# Patient Record
Sex: MALE | Race: WHITE | NOT HISPANIC OR LATINO | Employment: OTHER | URBAN - METROPOLITAN AREA
[De-identification: names, ages, dates, MRNs, and addresses within clinical notes are randomized per-mention and may not be internally consistent; named-entity substitution may affect disease eponyms.]

---

## 2022-05-04 ENCOUNTER — ANESTHESIA EVENT (OUTPATIENT)
Dept: SURGERY | Facility: CLINIC | Age: 27
End: 2022-05-04
Payer: COMMERCIAL

## 2022-05-04 ENCOUNTER — ANESTHESIA (OUTPATIENT)
Dept: SURGERY | Facility: CLINIC | Age: 27
End: 2022-05-04
Payer: COMMERCIAL

## 2022-05-04 ENCOUNTER — APPOINTMENT (OUTPATIENT)
Dept: GENERAL RADIOLOGY | Facility: CLINIC | Age: 27
End: 2022-05-04
Attending: STUDENT IN AN ORGANIZED HEALTH CARE EDUCATION/TRAINING PROGRAM
Payer: COMMERCIAL

## 2022-05-04 ENCOUNTER — HOSPITAL ENCOUNTER (INPATIENT)
Facility: CLINIC | Age: 27
LOS: 22 days | Discharge: HOME | End: 2022-05-26
Attending: THORACIC SURGERY (CARDIOTHORACIC VASCULAR SURGERY) | Admitting: OTOLARYNGOLOGY
Payer: COMMERCIAL

## 2022-05-04 DIAGNOSIS — Z93.1 GASTROSTOMY IN PLACE (H): ICD-10-CM

## 2022-05-04 DIAGNOSIS — J98.51 MEDIASTINITIS: Primary | ICD-10-CM

## 2022-05-04 DIAGNOSIS — D72.819 LEUKOPENIA, UNSPECIFIED TYPE: ICD-10-CM

## 2022-05-04 DIAGNOSIS — M79.89 NECROTIZING SOFT TISSUE INFECTION: ICD-10-CM

## 2022-05-04 DIAGNOSIS — F41.9 ANXIETY: ICD-10-CM

## 2022-05-04 DIAGNOSIS — R74.8 ELEVATED LIVER ENZYMES: ICD-10-CM

## 2022-05-04 LAB
ABO/RH(D): NORMAL
ALBUMIN SERPL-MCNC: 2 G/DL (ref 3.4–5)
ALP SERPL-CCNC: 93 U/L (ref 40–150)
ALT SERPL W P-5'-P-CCNC: 26 U/L (ref 0–70)
ANION GAP SERPL CALCULATED.3IONS-SCNC: 4 MMOL/L (ref 3–14)
ANTIBODY SCREEN: NEGATIVE
APTT PPP: 39 SECONDS (ref 22–38)
APTT PPP: 44 SECONDS (ref 22–38)
AST SERPL W P-5'-P-CCNC: 32 U/L (ref 0–45)
BASE EXCESS BLDA CALC-SCNC: -1.5 MMOL/L (ref -9–1.8)
BASE EXCESS BLDA CALC-SCNC: -2.1 MMOL/L (ref -9.6–2)
BASE EXCESS BLDA CALC-SCNC: -2.9 MMOL/L (ref -9–1.8)
BILIRUB SERPL-MCNC: 1.5 MG/DL (ref 0.2–1.3)
BLD PROD TYP BPU: NORMAL
BLD PROD TYP BPU: NORMAL
BLOOD COMPONENT TYPE: NORMAL
BLOOD COMPONENT TYPE: NORMAL
BUN SERPL-MCNC: 31 MG/DL (ref 7–30)
CA-I BLD-MCNC: 4.3 MG/DL (ref 4.4–5.2)
CA-I BLD-MCNC: 4.4 MG/DL (ref 4.4–5.2)
CALCIUM SERPL-MCNC: 7.8 MG/DL (ref 8.5–10.1)
CANNABINOIDS UR QL SCN: ABNORMAL
CHLORIDE BLD-SCNC: 108 MMOL/L (ref 94–109)
CK SERPL-CCNC: 384 U/L (ref 30–300)
CO2 SERPL-SCNC: 25 MMOL/L (ref 20–32)
CODING SYSTEM: NORMAL
CODING SYSTEM: NORMAL
CREAT SERPL-MCNC: 1.12 MG/DL (ref 0.66–1.25)
CREAT UR-MCNC: 86 MG/DL
CROSSMATCH: NORMAL
CROSSMATCH: NORMAL
CRP SERPL-MCNC: 330 MG/L (ref 0–8)
CRP SERPL-MCNC: 350 MG/L (ref 0–8)
ERYTHROCYTE [DISTWIDTH] IN BLOOD BY AUTOMATED COUNT: 14.2 % (ref 10–15)
ERYTHROCYTE [SEDIMENTATION RATE] IN BLOOD BY WESTERGREN METHOD: 69 MM/HR (ref 0–15)
ERYTHROCYTE [SEDIMENTATION RATE] IN BLOOD BY WESTERGREN METHOD: 80 MM/HR (ref 0–15)
FIBRINOGEN PPP-MCNC: 692 MG/DL (ref 170–490)
FIBRINOGEN PPP-MCNC: 749 MG/DL (ref 170–490)
GFR SERPL CREATININE-BSD FRML MDRD: >90 ML/MIN/1.73M2
GLUCOSE BLD-MCNC: 122 MG/DL (ref 70–99)
GLUCOSE BLD-MCNC: 131 MG/DL (ref 70–99)
GLUCOSE BLDC GLUCOMTR-MCNC: 120 MG/DL (ref 70–99)
GLUCOSE BLDC GLUCOMTR-MCNC: 125 MG/DL (ref 70–99)
GLUCOSE BLDC GLUCOMTR-MCNC: 127 MG/DL (ref 70–99)
GLUCOSE BLDC GLUCOMTR-MCNC: 130 MG/DL (ref 70–99)
GLUCOSE BLDC GLUCOMTR-MCNC: 155 MG/DL (ref 70–99)
GLUCOSE BLDC GLUCOMTR-MCNC: 160 MG/DL (ref 70–99)
GRAM STAIN RESULT: ABNORMAL
HAV IGG SER QL IA: NONREACTIVE
HAV IGM SERPL QL IA: NONREACTIVE
HBA1C MFR BLD: 4.8 % (ref 0–5.6)
HBV CORE AB SERPL QL IA: NONREACTIVE
HBV SURFACE AB SERPL IA-ACNC: 759.78 M[IU]/ML
HBV SURFACE AG SERPL QL IA: NONREACTIVE
HCO3 BLD-SCNC: 24 MMOL/L (ref 21–28)
HCO3 BLD-SCNC: 24 MMOL/L (ref 21–28)
HCO3 BLDA-SCNC: 23 MMOL/L (ref 21–28)
HCT VFR BLD AUTO: 30.6 % (ref 40–53)
HCV AB SERPL QL IA: NONREACTIVE
HGB BLD-MCNC: 10 G/DL (ref 13.3–17.7)
HGB BLD-MCNC: 9.7 G/DL (ref 13.3–17.7)
INR PPP: 1.36 (ref 0.85–1.15)
INR PPP: 1.41 (ref 0.85–1.15)
ISSUE DATE AND TIME: NORMAL
ISSUE DATE AND TIME: NORMAL
LACTATE BLD-SCNC: 1 MMOL/L
LACTATE SERPL-SCNC: 1.7 MMOL/L (ref 0.7–2)
MAGNESIUM SERPL-MCNC: 1.9 MG/DL (ref 1.6–2.3)
MCH RBC QN AUTO: 30 PG (ref 26.5–33)
MCHC RBC AUTO-ENTMCNC: 32.7 G/DL (ref 31.5–36.5)
MCV RBC AUTO: 92 FL (ref 78–100)
MRSA DNA SPEC QL NAA+PROBE: NEGATIVE
O2/TOTAL GAS SETTING VFR VENT: 100 %
O2/TOTAL GAS SETTING VFR VENT: 50 %
O2/TOTAL GAS SETTING VFR VENT: 60 %
OXYHGB MFR BLD: 97 % (ref 92–100)
OXYHGB MFR BLDA: 99 % (ref 92–100)
PCO2 BLD: 41 MM HG (ref 35–45)
PCO2 BLD: 51 MM HG (ref 35–45)
PCO2 BLDA: 38 MM HG (ref 35–45)
PH BLD: 7.28 [PH] (ref 7.35–7.45)
PH BLD: 7.37 [PH] (ref 7.35–7.45)
PH BLDA: 7.39 [PH] (ref 7.35–7.45)
PHOSPHATE SERPL-MCNC: 3.7 MG/DL (ref 2.5–4.5)
PLATELET # BLD AUTO: 199 10E3/UL (ref 150–450)
PLATELET # BLD AUTO: 241 10E3/UL (ref 150–450)
PO2 BLD: 136 MM HG (ref 80–105)
PO2 BLD: 175 MM HG (ref 80–105)
PO2 BLDA: 444 MM HG (ref 80–105)
POTASSIUM BLD-SCNC: 3.8 MMOL/L (ref 3.5–5)
POTASSIUM BLD-SCNC: 4 MMOL/L (ref 3.4–5.3)
POTASSIUM BLD-SCNC: 4 MMOL/L (ref 3.4–5.3)
PREALB SERPL IA-MCNC: 6 MG/DL (ref 15–45)
PREALB SERPL IA-MCNC: 6 MG/DL (ref 15–45)
PROT SERPL-MCNC: 5.7 G/DL (ref 6.8–8.8)
RBC # BLD AUTO: 3.33 10E6/UL (ref 4.4–5.9)
SA TARGET DNA: POSITIVE
SODIUM BLD-SCNC: 137 MMOL/L (ref 133–144)
SODIUM SERPL-SCNC: 137 MMOL/L (ref 133–144)
SPECIMEN EXPIRATION DATE: NORMAL
UNIT ABO/RH: NORMAL
UNIT ABO/RH: NORMAL
UNIT NUMBER: NORMAL
UNIT NUMBER: NORMAL
UNIT STATUS: NORMAL
UNIT STATUS: NORMAL
UNIT TYPE ISBT: 9500
UNIT TYPE ISBT: 9500
WBC # BLD AUTO: 12.4 10E3/UL (ref 4–11)

## 2022-05-04 PROCEDURE — 272N000473 HC KIT, VPS RHYTHM STYLET

## 2022-05-04 PROCEDURE — 82805 BLOOD GASES W/O2 SATURATION: CPT | Performed by: STUDENT IN AN ORGANIZED HEALTH CARE EDUCATION/TRAINING PROGRAM

## 2022-05-04 PROCEDURE — 258N000003 HC RX IP 258 OP 636: Performed by: ANESTHESIOLOGY

## 2022-05-04 PROCEDURE — 85049 AUTOMATED PLATELET COUNT: CPT | Performed by: ANESTHESIOLOGY

## 2022-05-04 PROCEDURE — 86803 HEPATITIS C AB TEST: CPT | Performed by: DIETITIAN, REGISTERED

## 2022-05-04 PROCEDURE — 82550 ASSAY OF CK (CPK): CPT | Performed by: STUDENT IN AN ORGANIZED HEALTH CARE EDUCATION/TRAINING PROGRAM

## 2022-05-04 PROCEDURE — 87205 SMEAR GRAM STAIN: CPT | Performed by: THORACIC SURGERY (CARDIOTHORACIC VASCULAR SURGERY)

## 2022-05-04 PROCEDURE — 250N000011 HC RX IP 250 OP 636

## 2022-05-04 PROCEDURE — 0KB30ZZ EXCISION OF LEFT NECK MUSCLE, OPEN APPROACH: ICD-10-PCS | Performed by: OTOLARYNGOLOGY

## 2022-05-04 PROCEDURE — 5A1955Z RESPIRATORY VENTILATION, GREATER THAN 96 CONSECUTIVE HOURS: ICD-10-PCS | Performed by: OTOLARYNGOLOGY

## 2022-05-04 PROCEDURE — 80307 DRUG TEST PRSMV CHEM ANLYZR: CPT | Performed by: STUDENT IN AN ORGANIZED HEALTH CARE EDUCATION/TRAINING PROGRAM

## 2022-05-04 PROCEDURE — 0W930ZZ DRAINAGE OF ORAL CAVITY AND THROAT, OPEN APPROACH: ICD-10-PCS | Performed by: OTOLARYNGOLOGY

## 2022-05-04 PROCEDURE — C9113 INJ PANTOPRAZOLE SODIUM, VIA: HCPCS | Performed by: STUDENT IN AN ORGANIZED HEALTH CARE EDUCATION/TRAINING PROGRAM

## 2022-05-04 PROCEDURE — 999N000157 HC STATISTIC RCP TIME EA 10 MIN

## 2022-05-04 PROCEDURE — 71045 X-RAY EXAM CHEST 1 VIEW: CPT

## 2022-05-04 PROCEDURE — P9041 ALBUMIN (HUMAN),5%, 50ML: HCPCS

## 2022-05-04 PROCEDURE — 80053 COMPREHEN METABOLIC PANEL: CPT | Performed by: STUDENT IN AN ORGANIZED HEALTH CARE EDUCATION/TRAINING PROGRAM

## 2022-05-04 PROCEDURE — 3E043XZ INTRODUCTION OF VASOPRESSOR INTO CENTRAL VEIN, PERCUTANEOUS APPROACH: ICD-10-PCS | Performed by: OTOLARYNGOLOGY

## 2022-05-04 PROCEDURE — 85652 RBC SED RATE AUTOMATED: CPT | Performed by: STUDENT IN AN ORGANIZED HEALTH CARE EDUCATION/TRAINING PROGRAM

## 2022-05-04 PROCEDURE — 0CDXXZ1 EXTRACTION OF LOWER TOOTH, MULTIPLE, EXTERNAL APPROACH: ICD-10-PCS | Performed by: OTOLARYNGOLOGY

## 2022-05-04 PROCEDURE — 250N000025 HC SEVOFLURANE, PER MIN: Performed by: THORACIC SURGERY (CARDIOTHORACIC VASCULAR SURGERY)

## 2022-05-04 PROCEDURE — 250N000009 HC RX 250: Performed by: OTOLARYNGOLOGY

## 2022-05-04 PROCEDURE — 250N000009 HC RX 250: Performed by: NURSE ANESTHETIST, CERTIFIED REGISTERED

## 2022-05-04 PROCEDURE — 250N000011 HC RX IP 250 OP 636: Performed by: NURSE ANESTHETIST, CERTIFIED REGISTERED

## 2022-05-04 PROCEDURE — 85610 PROTHROMBIN TIME: CPT | Performed by: ANESTHESIOLOGY

## 2022-05-04 PROCEDURE — 250N000011 HC RX IP 250 OP 636: Performed by: ANESTHESIOLOGY

## 2022-05-04 PROCEDURE — 83735 ASSAY OF MAGNESIUM: CPT | Performed by: STUDENT IN AN ORGANIZED HEALTH CARE EDUCATION/TRAINING PROGRAM

## 2022-05-04 PROCEDURE — 82803 BLOOD GASES ANY COMBINATION: CPT

## 2022-05-04 PROCEDURE — 85730 THROMBOPLASTIN TIME PARTIAL: CPT | Performed by: STUDENT IN AN ORGANIZED HEALTH CARE EDUCATION/TRAINING PROGRAM

## 2022-05-04 PROCEDURE — 360N000077 HC SURGERY LEVEL 4, PER MIN: Performed by: THORACIC SURGERY (CARDIOTHORACIC VASCULAR SURGERY)

## 2022-05-04 PROCEDURE — P9016 RBC LEUKOCYTES REDUCED: HCPCS

## 2022-05-04 PROCEDURE — 36415 COLL VENOUS BLD VENIPUNCTURE: CPT | Performed by: THORACIC SURGERY (CARDIOTHORACIC VASCULAR SURGERY)

## 2022-05-04 PROCEDURE — 86140 C-REACTIVE PROTEIN: CPT

## 2022-05-04 PROCEDURE — 39010 EXPLORATION OF CHEST: CPT | Mod: GC | Performed by: THORACIC SURGERY (CARDIOTHORACIC VASCULAR SURGERY)

## 2022-05-04 PROCEDURE — 250N000009 HC RX 250: Performed by: THORACIC SURGERY (CARDIOTHORACIC VASCULAR SURGERY)

## 2022-05-04 PROCEDURE — 87070 CULTURE OTHR SPECIMN AEROBIC: CPT | Performed by: THORACIC SURGERY (CARDIOTHORACIC VASCULAR SURGERY)

## 2022-05-04 PROCEDURE — 82803 BLOOD GASES ANY COMBINATION: CPT | Performed by: DIETITIAN, REGISTERED

## 2022-05-04 PROCEDURE — 200N000002 HC R&B ICU UMMC

## 2022-05-04 PROCEDURE — 86901 BLOOD TYPING SEROLOGIC RH(D): CPT | Performed by: ANESTHESIOLOGY

## 2022-05-04 PROCEDURE — 87641 MR-STAPH DNA AMP PROBE: CPT | Performed by: STUDENT IN AN ORGANIZED HEALTH CARE EDUCATION/TRAINING PROGRAM

## 2022-05-04 PROCEDURE — 85027 COMPLETE CBC AUTOMATED: CPT | Performed by: STUDENT IN AN ORGANIZED HEALTH CARE EDUCATION/TRAINING PROGRAM

## 2022-05-04 PROCEDURE — 86709 HEPATITIS A IGM ANTIBODY: CPT | Performed by: DIETITIAN, REGISTERED

## 2022-05-04 PROCEDURE — 0WJB4ZZ INSPECTION OF LEFT PLEURAL CAVITY, PERCUTANEOUS ENDOSCOPIC APPROACH: ICD-10-PCS | Performed by: THORACIC SURGERY (CARDIOTHORACIC VASCULAR SURGERY)

## 2022-05-04 PROCEDURE — 250N000011 HC RX IP 250 OP 636: Performed by: THORACIC SURGERY (CARDIOTHORACIC VASCULAR SURGERY)

## 2022-05-04 PROCEDURE — 99291 CRITICAL CARE FIRST HOUR: CPT | Mod: GC | Performed by: ANESTHESIOLOGY

## 2022-05-04 PROCEDURE — 0W9C0ZZ DRAINAGE OF MEDIASTINUM, OPEN APPROACH: ICD-10-PCS | Performed by: THORACIC SURGERY (CARDIOTHORACIC VASCULAR SURGERY)

## 2022-05-04 PROCEDURE — 99254 IP/OBS CNSLTJ NEW/EST MOD 60: CPT | Performed by: INTERNAL MEDICINE

## 2022-05-04 PROCEDURE — 84134 ASSAY OF PREALBUMIN: CPT

## 2022-05-04 PROCEDURE — 258N000003 HC RX IP 258 OP 636: Performed by: NURSE ANESTHETIST, CERTIFIED REGISTERED

## 2022-05-04 PROCEDURE — 86850 RBC ANTIBODY SCREEN: CPT | Performed by: ANESTHESIOLOGY

## 2022-05-04 PROCEDURE — 85652 RBC SED RATE AUTOMATED: CPT

## 2022-05-04 PROCEDURE — 85384 FIBRINOGEN ACTIVITY: CPT | Performed by: ANESTHESIOLOGY

## 2022-05-04 PROCEDURE — 94002 VENT MGMT INPAT INIT DAY: CPT

## 2022-05-04 PROCEDURE — 272N000451 HC KIT SHRLOCK 5FR POWER PICC DOUBLE LUMEN

## 2022-05-04 PROCEDURE — 85384 FIBRINOGEN ACTIVITY: CPT | Performed by: STUDENT IN AN ORGANIZED HEALTH CARE EDUCATION/TRAINING PROGRAM

## 2022-05-04 PROCEDURE — 86704 HEP B CORE ANTIBODY TOTAL: CPT | Performed by: DIETITIAN, REGISTERED

## 2022-05-04 PROCEDURE — 36569 INSJ PICC 5 YR+ W/O IMAGING: CPT

## 2022-05-04 PROCEDURE — 370N000017 HC ANESTHESIA TECHNICAL FEE, PER MIN: Performed by: THORACIC SURGERY (CARDIOTHORACIC VASCULAR SURGERY)

## 2022-05-04 PROCEDURE — 272N000001 HC OR GENERAL SUPPLY STERILE: Performed by: THORACIC SURGERY (CARDIOTHORACIC VASCULAR SURGERY)

## 2022-05-04 PROCEDURE — 250N000013 HC RX MED GY IP 250 OP 250 PS 637

## 2022-05-04 PROCEDURE — 82330 ASSAY OF CALCIUM: CPT | Performed by: STUDENT IN AN ORGANIZED HEALTH CARE EDUCATION/TRAINING PROGRAM

## 2022-05-04 PROCEDURE — 87340 HEPATITIS B SURFACE AG IA: CPT | Performed by: DIETITIAN, REGISTERED

## 2022-05-04 PROCEDURE — 250N000011 HC RX IP 250 OP 636: Performed by: STUDENT IN AN ORGANIZED HEALTH CARE EDUCATION/TRAINING PROGRAM

## 2022-05-04 PROCEDURE — 83036 HEMOGLOBIN GLYCOSYLATED A1C: CPT | Performed by: STUDENT IN AN ORGANIZED HEALTH CARE EDUCATION/TRAINING PROGRAM

## 2022-05-04 PROCEDURE — 0WJ94ZZ INSPECTION OF RIGHT PLEURAL CAVITY, PERCUTANEOUS ENDOSCOPIC APPROACH: ICD-10-PCS | Performed by: THORACIC SURGERY (CARDIOTHORACIC VASCULAR SURGERY)

## 2022-05-04 PROCEDURE — 99291 CRITICAL CARE FIRST HOUR: CPT | Performed by: INTERNAL MEDICINE

## 2022-05-04 PROCEDURE — 87077 CULTURE AEROBIC IDENTIFY: CPT | Performed by: THORACIC SURGERY (CARDIOTHORACIC VASCULAR SURGERY)

## 2022-05-04 PROCEDURE — 250N000009 HC RX 250: Performed by: STUDENT IN AN ORGANIZED HEALTH CARE EDUCATION/TRAINING PROGRAM

## 2022-05-04 PROCEDURE — 87521 HEPATITIS C PROBE&RVRS TRNSC: CPT | Performed by: DIETITIAN, REGISTERED

## 2022-05-04 PROCEDURE — 84100 ASSAY OF PHOSPHORUS: CPT | Performed by: STUDENT IN AN ORGANIZED HEALTH CARE EDUCATION/TRAINING PROGRAM

## 2022-05-04 PROCEDURE — 0W960ZZ DRAINAGE OF NECK, OPEN APPROACH: ICD-10-PCS | Performed by: OTOLARYNGOLOGY

## 2022-05-04 PROCEDURE — 88300 SURGICAL PATH GROSS: CPT | Mod: TC | Performed by: THORACIC SURGERY (CARDIOTHORACIC VASCULAR SURGERY)

## 2022-05-04 PROCEDURE — 86706 HEP B SURFACE ANTIBODY: CPT | Performed by: DIETITIAN, REGISTERED

## 2022-05-04 PROCEDURE — 82810 BLOOD GASES O2 SAT ONLY: CPT

## 2022-05-04 PROCEDURE — 85730 THROMBOPLASTIN TIME PARTIAL: CPT | Performed by: ANESTHESIOLOGY

## 2022-05-04 PROCEDURE — 0C9M0ZZ DRAINAGE OF PHARYNX, OPEN APPROACH: ICD-10-PCS | Performed by: OTOLARYNGOLOGY

## 2022-05-04 PROCEDURE — 87040 BLOOD CULTURE FOR BACTERIA: CPT | Performed by: THORACIC SURGERY (CARDIOTHORACIC VASCULAR SURGERY)

## 2022-05-04 PROCEDURE — 258N000003 HC RX IP 258 OP 636: Performed by: STUDENT IN AN ORGANIZED HEALTH CARE EDUCATION/TRAINING PROGRAM

## 2022-05-04 PROCEDURE — 0DH63UZ INSERTION OF FEEDING DEVICE INTO STOMACH, PERCUTANEOUS APPROACH: ICD-10-PCS | Performed by: THORACIC SURGERY (CARDIOTHORACIC VASCULAR SURGERY)

## 2022-05-04 PROCEDURE — 83605 ASSAY OF LACTIC ACID: CPT | Performed by: STUDENT IN AN ORGANIZED HEALTH CARE EDUCATION/TRAINING PROGRAM

## 2022-05-04 PROCEDURE — 85610 PROTHROMBIN TIME: CPT | Performed by: STUDENT IN AN ORGANIZED HEALTH CARE EDUCATION/TRAINING PROGRAM

## 2022-05-04 PROCEDURE — 87075 CULTR BACTERIA EXCEPT BLOOD: CPT | Performed by: THORACIC SURGERY (CARDIOTHORACIC VASCULAR SURGERY)

## 2022-05-04 PROCEDURE — 250N000011 HC RX IP 250 OP 636: Performed by: DIETITIAN, REGISTERED

## 2022-05-04 PROCEDURE — 87102 FUNGUS ISOLATION CULTURE: CPT | Performed by: THORACIC SURGERY (CARDIOTHORACIC VASCULAR SURGERY)

## 2022-05-04 PROCEDURE — 71045 X-RAY EXAM CHEST 1 VIEW: CPT | Mod: 26 | Performed by: RADIOLOGY

## 2022-05-04 PROCEDURE — 84134 ASSAY OF PREALBUMIN: CPT | Performed by: STUDENT IN AN ORGANIZED HEALTH CARE EDUCATION/TRAINING PROGRAM

## 2022-05-04 PROCEDURE — 272N000201 ZZ HC ADHESIVE SKIN CLOSURE, DERMABOND

## 2022-05-04 PROCEDURE — 86140 C-REACTIVE PROTEIN: CPT | Performed by: STUDENT IN AN ORGANIZED HEALTH CARE EDUCATION/TRAINING PROGRAM

## 2022-05-04 PROCEDURE — 258N000003 HC RX IP 258 OP 636

## 2022-05-04 PROCEDURE — 86923 COMPATIBILITY TEST ELECTRIC: CPT

## 2022-05-04 PROCEDURE — 250N000012 HC RX MED GY IP 250 OP 636 PS 637: Performed by: STUDENT IN AN ORGANIZED HEALTH CARE EDUCATION/TRAINING PROGRAM

## 2022-05-04 PROCEDURE — 86708 HEPATITIS A ANTIBODY: CPT | Performed by: DIETITIAN, REGISTERED

## 2022-05-04 PROCEDURE — 258N000003 HC RX IP 258 OP 636: Performed by: THORACIC SURGERY (CARDIOTHORACIC VASCULAR SURGERY)

## 2022-05-04 PROCEDURE — 999N000104 HC STATISTIC NO CHARGE

## 2022-05-04 RX ORDER — AMOXICILLIN 250 MG
1 CAPSULE ORAL 2 TIMES DAILY
Status: DISCONTINUED | OUTPATIENT
Start: 2022-05-04 | End: 2022-05-06

## 2022-05-04 RX ORDER — POLYETHYLENE GLYCOL 3350 17 G/17G
17 POWDER, FOR SOLUTION ORAL DAILY PRN
Status: DISCONTINUED | OUTPATIENT
Start: 2022-05-04 | End: 2022-05-06

## 2022-05-04 RX ORDER — HEPARIN SODIUM,PORCINE 10 UNIT/ML
5-20 VIAL (ML) INTRAVENOUS
Status: DISCONTINUED | OUTPATIENT
Start: 2022-05-04 | End: 2022-05-26 | Stop reason: HOSPADM

## 2022-05-04 RX ORDER — LIDOCAINE 40 MG/G
CREAM TOPICAL
Status: ACTIVE | OUTPATIENT
Start: 2022-05-04 | End: 2022-05-07

## 2022-05-04 RX ORDER — ALBUMIN, HUMAN INJ 5% 5 %
12.5 SOLUTION INTRAVENOUS ONCE
Status: COMPLETED | OUTPATIENT
Start: 2022-05-04 | End: 2022-05-04

## 2022-05-04 RX ORDER — PROPOFOL 10 MG/ML
INJECTION, EMULSION INTRAVENOUS CONTINUOUS PRN
Status: DISCONTINUED | OUTPATIENT
Start: 2022-05-04 | End: 2022-05-04

## 2022-05-04 RX ORDER — DEXTROSE MONOHYDRATE 25 G/50ML
25-50 INJECTION, SOLUTION INTRAVENOUS
Status: DISCONTINUED | OUTPATIENT
Start: 2022-05-04 | End: 2022-05-04

## 2022-05-04 RX ORDER — PROCHLORPERAZINE MALEATE 10 MG
10 TABLET ORAL EVERY 6 HOURS PRN
Status: DISCONTINUED | OUTPATIENT
Start: 2022-05-04 | End: 2022-05-26 | Stop reason: HOSPADM

## 2022-05-04 RX ORDER — BISACODYL 10 MG
10 SUPPOSITORY, RECTAL RECTAL DAILY PRN
Status: DISCONTINUED | OUTPATIENT
Start: 2022-05-04 | End: 2022-05-26 | Stop reason: HOSPADM

## 2022-05-04 RX ORDER — NICOTINE POLACRILEX 4 MG
15-30 LOZENGE BUCCAL
Status: DISCONTINUED | OUTPATIENT
Start: 2022-05-04 | End: 2022-05-04

## 2022-05-04 RX ORDER — PIPERACILLIN SODIUM, TAZOBACTAM SODIUM 2; .25 G/10ML; G/10ML
INJECTION, POWDER, LYOPHILIZED, FOR SOLUTION INTRAVENOUS PRN
Status: DISCONTINUED | OUTPATIENT
Start: 2022-05-04 | End: 2022-05-04

## 2022-05-04 RX ORDER — DEXTROSE MONOHYDRATE 25 G/50ML
25-50 INJECTION, SOLUTION INTRAVENOUS
Status: DISCONTINUED | OUTPATIENT
Start: 2022-05-04 | End: 2022-05-26 | Stop reason: HOSPADM

## 2022-05-04 RX ORDER — PROPOFOL 10 MG/ML
5-75 INJECTION, EMULSION INTRAVENOUS CONTINUOUS
Status: DISCONTINUED | OUTPATIENT
Start: 2022-05-04 | End: 2022-05-04

## 2022-05-04 RX ORDER — CALCIUM CHLORIDE 100 MG/ML
INJECTION INTRAVENOUS; INTRAVENTRICULAR PRN
Status: DISCONTINUED | OUTPATIENT
Start: 2022-05-04 | End: 2022-05-04

## 2022-05-04 RX ORDER — PROPOFOL 10 MG/ML
5-75 INJECTION, EMULSION INTRAVENOUS CONTINUOUS
Status: DISCONTINUED | OUTPATIENT
Start: 2022-05-04 | End: 2022-05-08

## 2022-05-04 RX ORDER — ACETAMINOPHEN 325 MG/1
650 TABLET ORAL EVERY 4 HOURS PRN
Status: DISCONTINUED | OUTPATIENT
Start: 2022-05-04 | End: 2022-05-26 | Stop reason: HOSPADM

## 2022-05-04 RX ORDER — CLINDAMYCIN PHOSPHATE 900 MG/50ML
900 INJECTION, SOLUTION INTRAVENOUS EVERY 8 HOURS
Status: DISCONTINUED | OUTPATIENT
Start: 2022-05-04 | End: 2022-05-04

## 2022-05-04 RX ORDER — ONDANSETRON 2 MG/ML
4 INJECTION INTRAMUSCULAR; INTRAVENOUS EVERY 6 HOURS PRN
Status: DISCONTINUED | OUTPATIENT
Start: 2022-05-04 | End: 2022-05-26 | Stop reason: HOSPADM

## 2022-05-04 RX ORDER — HEPARIN SODIUM,PORCINE 10 UNIT/ML
5-20 VIAL (ML) INTRAVENOUS EVERY 24 HOURS
Status: DISCONTINUED | OUTPATIENT
Start: 2022-05-04 | End: 2022-05-14 | Stop reason: CLARIF

## 2022-05-04 RX ORDER — SODIUM CHLORIDE, SODIUM LACTATE, POTASSIUM CHLORIDE, CALCIUM CHLORIDE 600; 310; 30; 20 MG/100ML; MG/100ML; MG/100ML; MG/100ML
INJECTION, SOLUTION INTRAVENOUS CONTINUOUS
Status: ACTIVE | OUTPATIENT
Start: 2022-05-04 | End: 2022-05-05

## 2022-05-04 RX ORDER — ONDANSETRON 2 MG/ML
INJECTION INTRAMUSCULAR; INTRAVENOUS PRN
Status: DISCONTINUED | OUTPATIENT
Start: 2022-05-04 | End: 2022-05-04

## 2022-05-04 RX ORDER — CLINDAMYCIN PHOSPHATE 900 MG/50ML
900 INJECTION, SOLUTION INTRAVENOUS EVERY 8 HOURS
Status: DISCONTINUED | OUTPATIENT
Start: 2022-05-04 | End: 2022-05-06

## 2022-05-04 RX ORDER — PIPERACILLIN SODIUM, TAZOBACTAM SODIUM 4; .5 G/20ML; G/20ML
4.5 INJECTION, POWDER, LYOPHILIZED, FOR SOLUTION INTRAVENOUS EVERY 6 HOURS
Status: DISCONTINUED | OUTPATIENT
Start: 2022-05-04 | End: 2022-05-06

## 2022-05-04 RX ORDER — NICOTINE POLACRILEX 4 MG
15-30 LOZENGE BUCCAL
Status: DISCONTINUED | OUTPATIENT
Start: 2022-05-04 | End: 2022-05-26 | Stop reason: HOSPADM

## 2022-05-04 RX ORDER — HEPARIN SODIUM 5000 [USP'U]/.5ML
5000 INJECTION, SOLUTION INTRAVENOUS; SUBCUTANEOUS EVERY 8 HOURS
Status: DISCONTINUED | OUTPATIENT
Start: 2022-05-04 | End: 2022-05-08

## 2022-05-04 RX ORDER — NOREPINEPHRINE BITARTRATE 0.06 MG/ML
.01-.6 INJECTION, SOLUTION INTRAVENOUS CONTINUOUS
Status: DISCONTINUED | OUTPATIENT
Start: 2022-05-04 | End: 2022-05-05

## 2022-05-04 RX ORDER — LIDOCAINE HYDROCHLORIDE 10 MG/ML
INJECTION, SOLUTION INFILTRATION; PERINEURAL PRN
Status: DISCONTINUED | OUTPATIENT
Start: 2022-05-04 | End: 2022-05-04 | Stop reason: HOSPADM

## 2022-05-04 RX ORDER — AMINO AC/PROTEIN HYDR/WHEY PRO 10G-100/30
2 LIQUID (ML) ORAL 2 TIMES DAILY
Status: DISCONTINUED | OUTPATIENT
Start: 2022-05-04 | End: 2022-05-25

## 2022-05-04 RX ORDER — ONDANSETRON 4 MG/1
4 TABLET, ORALLY DISINTEGRATING ORAL EVERY 6 HOURS PRN
Status: DISCONTINUED | OUTPATIENT
Start: 2022-05-04 | End: 2022-05-26 | Stop reason: HOSPADM

## 2022-05-04 RX ORDER — NOREPINEPHRINE BITARTRATE 0.06 MG/ML
.01-.6 INJECTION, SOLUTION INTRAVENOUS CONTINUOUS
Status: DISCONTINUED | OUTPATIENT
Start: 2022-05-04 | End: 2022-05-04

## 2022-05-04 RX ORDER — SODIUM CHLORIDE, SODIUM GLUCONATE, SODIUM ACETATE, POTASSIUM CHLORIDE AND MAGNESIUM CHLORIDE 526; 502; 368; 37; 30 MG/100ML; MG/100ML; MG/100ML; MG/100ML; MG/100ML
INJECTION, SOLUTION INTRAVENOUS CONTINUOUS PRN
Status: DISCONTINUED | OUTPATIENT
Start: 2022-05-04 | End: 2022-05-04

## 2022-05-04 RX ORDER — LIDOCAINE HYDROCHLORIDE AND EPINEPHRINE 10; 10 MG/ML; UG/ML
INJECTION, SOLUTION INFILTRATION; PERINEURAL PRN
Status: DISCONTINUED | OUTPATIENT
Start: 2022-05-04 | End: 2022-05-04 | Stop reason: HOSPADM

## 2022-05-04 RX ORDER — PROCHLORPERAZINE 25 MG
25 SUPPOSITORY, RECTAL RECTAL EVERY 12 HOURS PRN
Status: DISCONTINUED | OUTPATIENT
Start: 2022-05-04 | End: 2022-05-26 | Stop reason: HOSPADM

## 2022-05-04 RX ORDER — DEXTROSE MONOHYDRATE 100 MG/ML
INJECTION, SOLUTION INTRAVENOUS CONTINUOUS PRN
Status: DISCONTINUED | OUTPATIENT
Start: 2022-05-04 | End: 2022-05-26 | Stop reason: HOSPADM

## 2022-05-04 RX ADMIN — VANCOMYCIN HYDROCHLORIDE 2500 MG: 1 INJECTION, POWDER, LYOPHILIZED, FOR SOLUTION INTRAVENOUS at 06:41

## 2022-05-04 RX ADMIN — PHENYLEPHRINE HYDROCHLORIDE 150 MCG: 10 INJECTION INTRAVENOUS at 01:59

## 2022-05-04 RX ADMIN — CLINDAMYCIN IN 5 PERCENT DEXTROSE 900 MG: 18 INJECTION, SOLUTION INTRAVENOUS at 07:56

## 2022-05-04 RX ADMIN — PIPERACILLIN AND TAZOBACTAM 2.25 G: 2; .25 INJECTION, POWDER, FOR SOLUTION INTRAVENOUS at 05:00

## 2022-05-04 RX ADMIN — PROPOFOL 45 MCG/KG/MIN: 10 INJECTION, EMULSION INTRAVENOUS at 22:05

## 2022-05-04 RX ADMIN — PIPERACILLIN AND TAZOBACTAM 2.25 G: 2; .25 INJECTION, POWDER, FOR SOLUTION INTRAVENOUS at 03:00

## 2022-05-04 RX ADMIN — SENNOSIDES AND DOCUSATE SODIUM 1 TABLET: 8.6; 5 TABLET ORAL at 20:15

## 2022-05-04 RX ADMIN — SODIUM CHLORIDE, POTASSIUM CHLORIDE, SODIUM LACTATE AND CALCIUM CHLORIDE: 600; 310; 30; 20 INJECTION, SOLUTION INTRAVENOUS at 16:11

## 2022-05-04 RX ADMIN — PIPERACILLIN SODIUM AND TAZOBACTAM SODIUM 4.5 G: 4; .5 INJECTION, POWDER, LYOPHILIZED, FOR SOLUTION INTRAVENOUS at 17:34

## 2022-05-04 RX ADMIN — PHENYLEPHRINE HYDROCHLORIDE 150 MCG: 10 INJECTION INTRAVENOUS at 02:04

## 2022-05-04 RX ADMIN — HEPARIN SODIUM 5000 UNITS: 5000 INJECTION, SOLUTION INTRAVENOUS; SUBCUTANEOUS at 20:15

## 2022-05-04 RX ADMIN — ONDANSETRON 4 MG: 2 INJECTION INTRAMUSCULAR; INTRAVENOUS at 04:54

## 2022-05-04 RX ADMIN — PHENYLEPHRINE HYDROCHLORIDE 0.8 MCG/KG/MIN: 10 INJECTION INTRAVENOUS at 02:06

## 2022-05-04 RX ADMIN — PROPOFOL 75 MCG/KG/MIN: 10 INJECTION, EMULSION INTRAVENOUS at 06:27

## 2022-05-04 RX ADMIN — PROPOFOL 40 MCG/KG/MIN: 10 INJECTION, EMULSION INTRAVENOUS at 13:14

## 2022-05-04 RX ADMIN — THIAMINE HYDROCHLORIDE 200 MG: 100 INJECTION, SOLUTION INTRAMUSCULAR; INTRAVENOUS at 20:16

## 2022-05-04 RX ADMIN — INSULIN ASPART 1 UNITS: 100 INJECTION, SOLUTION INTRAVENOUS; SUBCUTANEOUS at 20:23

## 2022-05-04 RX ADMIN — PANTOPRAZOLE SODIUM 40 MG: 40 INJECTION, POWDER, FOR SOLUTION INTRAVENOUS at 07:52

## 2022-05-04 RX ADMIN — PROPOFOL 100 MCG/KG/MIN: 10 INJECTION, EMULSION INTRAVENOUS at 05:00

## 2022-05-04 RX ADMIN — ROCURONIUM BROMIDE 50 MG: 50 INJECTION, SOLUTION INTRAVENOUS at 02:27

## 2022-05-04 RX ADMIN — SODIUM CHLORIDE, SODIUM GLUCONATE, SODIUM ACETATE, POTASSIUM CHLORIDE AND MAGNESIUM CHLORIDE: 526; 502; 368; 37; 30 INJECTION, SOLUTION INTRAVENOUS at 04:24

## 2022-05-04 RX ADMIN — Medication 0.05 MCG/KG/MIN: at 06:05

## 2022-05-04 RX ADMIN — ALBUMIN (HUMAN) 12.5 G: 12.5 INJECTION, SOLUTION INTRAVENOUS at 17:27

## 2022-05-04 RX ADMIN — PROPOFOL 45 MCG/KG/MIN: 10 INJECTION, EMULSION INTRAVENOUS at 17:43

## 2022-05-04 RX ADMIN — CLINDAMYCIN IN 5 PERCENT DEXTROSE 900 MG: 18 INJECTION, SOLUTION INTRAVENOUS at 15:46

## 2022-05-04 RX ADMIN — HYDROCORTISONE SODIUM SUCCINATE 50 MG: 100 INJECTION, POWDER, FOR SOLUTION INTRAMUSCULAR; INTRAVENOUS at 22:05

## 2022-05-04 RX ADMIN — Medication 2 PACKET: at 20:15

## 2022-05-04 RX ADMIN — HEPARIN SODIUM 5000 UNITS: 5000 INJECTION, SOLUTION INTRAVENOUS; SUBCUTANEOUS at 12:20

## 2022-05-04 RX ADMIN — Medication 100 MCG/HR: at 06:05

## 2022-05-04 RX ADMIN — PROPOFOL 60 MCG/KG/MIN: 10 INJECTION, EMULSION INTRAVENOUS at 09:10

## 2022-05-04 RX ADMIN — PROPOFOL 75 MCG/KG/MIN: 10 INJECTION, EMULSION INTRAVENOUS at 06:04

## 2022-05-04 RX ADMIN — SENNOSIDES AND DOCUSATE SODIUM 1 TABLET: 8.6; 5 TABLET ORAL at 10:27

## 2022-05-04 RX ADMIN — Medication 2 PACKET: at 12:31

## 2022-05-04 RX ADMIN — SODIUM CHLORIDE, POTASSIUM CHLORIDE, SODIUM LACTATE AND CALCIUM CHLORIDE: 600; 310; 30; 20 INJECTION, SOLUTION INTRAVENOUS at 06:01

## 2022-05-04 RX ADMIN — ROCURONIUM BROMIDE 30 MG: 50 INJECTION, SOLUTION INTRAVENOUS at 03:40

## 2022-05-04 RX ADMIN — VASOPRESSIN 2.4 UNITS/HR: 20 INJECTION INTRAVENOUS at 18:05

## 2022-05-04 RX ADMIN — CALCIUM CHLORIDE 750 MG: 100 INJECTION INTRAVENOUS; INTRAVENTRICULAR at 02:14

## 2022-05-04 RX ADMIN — Medication 0.07 MCG/KG/MIN: at 06:26

## 2022-05-04 RX ADMIN — THIAMINE HYDROCHLORIDE 200 MG: 100 INJECTION, SOLUTION INTRAMUSCULAR; INTRAVENOUS at 10:27

## 2022-05-04 RX ADMIN — SODIUM CHLORIDE, SODIUM GLUCONATE, SODIUM ACETATE, POTASSIUM CHLORIDE AND MAGNESIUM CHLORIDE: 526; 502; 368; 37; 30 INJECTION, SOLUTION INTRAVENOUS at 01:42

## 2022-05-04 RX ADMIN — PIPERACILLIN SODIUM AND TAZOBACTAM SODIUM 4.5 G: 4; .5 INJECTION, POWDER, LYOPHILIZED, FOR SOLUTION INTRAVENOUS at 23:35

## 2022-05-04 RX ADMIN — INSULIN ASPART 1 UNITS: 100 INJECTION, SOLUTION INTRAVENOUS; SUBCUTANEOUS at 23:39

## 2022-05-04 RX ADMIN — HYDROCORTISONE SODIUM SUCCINATE 50 MG: 100 INJECTION, POWDER, FOR SOLUTION INTRAMUSCULAR; INTRAVENOUS at 17:24

## 2022-05-04 RX ADMIN — PHENYLEPHRINE HYDROCHLORIDE 1 MCG/KG/MIN: 10 INJECTION INTRAVENOUS at 02:20

## 2022-05-04 RX ADMIN — PIPERACILLIN SODIUM AND TAZOBACTAM SODIUM 4.5 G: 4; .5 INJECTION, POWDER, LYOPHILIZED, FOR SOLUTION INTRAVENOUS at 06:32

## 2022-05-04 RX ADMIN — PIPERACILLIN SODIUM AND TAZOBACTAM SODIUM 4.5 G: 4; .5 INJECTION, POWDER, LYOPHILIZED, FOR SOLUTION INTRAVENOUS at 12:20

## 2022-05-04 ASSESSMENT — ACTIVITIES OF DAILY LIVING (ADL)
ADLS_ACUITY_SCORE: 20
ADLS_ACUITY_SCORE: 18
ADLS_ACUITY_SCORE: 20
ADLS_ACUITY_SCORE: 18
ADLS_ACUITY_SCORE: 20
ADLS_ACUITY_SCORE: 11
ADLS_ACUITY_SCORE: 18
ADLS_ACUITY_SCORE: 20
ADLS_ACUITY_SCORE: 20
ADLS_ACUITY_SCORE: 11
ADLS_ACUITY_SCORE: 20
ADLS_ACUITY_SCORE: 20
ADLS_ACUITY_SCORE: 11
ADLS_ACUITY_SCORE: 11
ADLS_ACUITY_SCORE: 12
ADLS_ACUITY_SCORE: 20

## 2022-05-04 NOTE — H&P
Otolaryngology H&P Note  May 4, 2022      CC: necrotizing fasciitis    HPI: Robert Gallo is a 26 year old male with no known past medical history transferred from [facility] [place] for management of necrotizing fasciitis of the neck with mediastinal involvement.    History obtained from outside hospital providers. Patient reported to have days of neck pain upon presentation and was intubated at an outside hospital. He was initially transferred to [facility] for management, but ultimately transferred to [facility] for definitive management in conjunction with Thoracic as repeat imaging demonstrated mediastinal involvement. Per mom, no significant past medical history and denies any drug use. Teeth have been in poor repair for a long time.    PMHx: No known past medical history    PSHx: Unable to obtain as patient intubated and sedated    Medications: Unable to obtain as patient intubated and sedated    Allergies:  Not on File    Social Hx: No smoking history or illicit drug use    Family Hx:   No family history on file.    ROS: 12 point review of systems is negative unless noted in HPI.    PHYSICAL EXAM:  There were no vitals taken for this visit.    General: laying in bed, no acute distress  HEAD: normocephalic, atraumatic  Face: symmetrical, submental firm and indurated   Ears: Auricles atraumatic, no otorrhea   Nose: no anterior drainage  Mouth: moist mucous membranes, dentitition in poor repair, floor of mouth fluctuant but otherwise soft; orotracheally intubated  Neck: submental space firm, anterior neck with fluctuance, minimal overlying skin changes  Neuro: sedated  Respiratory: mechanically ventilated    ROUTINE IP LABS (Last four results)  BMP  Recent Labs   Lab 05/04/22 0210      POTASSIUM 3.8   *     No lab results found in last 7 days.    CBC  Recent Labs   Lab 05/04/22 0217 05/04/22 0210   HGB  --  9.7*     --      No results for input(s): CRP, SED in the last 48839  hours.    INR  Recent Labs   Lab 05/04/22  0217   INR 1.36*       Nutrition  No lab results found in last 7 days.    All cultures:  No results for input(s): CULT in the last 168 hours.    Imaging:  No results found for this or any previous visit. Outside imaging reviewed. There is gas tracking in the bilateral floor of mouth medial to the ramus of the mandible. It extends inferiorly in the neck on the left superficial to the thyroid.    Assessment and Plan  Robert Gallo is a 26 year old male with no significant past medical history with necrotizing fasciitis of the neck with mediastinal involvement s/p I&D of bilateral parapharyngeal spaces, sublingual space, bilateral necks, and extraction of teeth #19 and 20 and bilateral VATS and PEG tube placement by Thoracic Surgery on 5/4/2022.    Neuro:  -Pain control and sedation per SICU    HEENT:  -Twice daily packing changes to the neck with Betadine soaked Kerlix to be performed by ENT  - Timing of future debridement TBD    Respiratory:  -Intubated, mechanically ventilated    CV/heme:  - hemodynamically stable    FEN/GI:  -Status post PEG tube placement by Thoracic Surgery    :  -Richard in place    Endo  -no active issues    Heme/ID:  -Trend CBC, CRP, and ESR  - Follow-up OR cultures  -Broad-spectrum antibiotics -currently on vancomycin, Zosyn, and clindamycin  - Repeat imaging per Thoracic - currently planning repeat imaging in 48 hours    PPX:  -Okay for chemical prophylaxis  - SCDs    Consults: OMFS, ID    Dispo: 4A    -- Patient seen and plan discussed with chief resident and staff surgeon Dr. Bao Gomez MD PGY3  Otolaryngology - Head & Neck Surgery   Please page the on-call resident with questions. If after hours, contact ENT with questions by dialing [phone] and entering job code [phone] when prompted.

## 2022-05-04 NOTE — PROGRESS NOTES
Brief Note    Contact patient's next of kin (zeyad Duvall). Discussed plan to proceed with surgery, risks, and theoretical risk of need for blood transfusion. Mom understands, in agreement with plan to proceed with surgery.    Dorina Gomez MD PGY3  ENT Resident

## 2022-05-04 NOTE — CONSULTS
ORAL & MAXILLOFACIAL SURGERY CONSULTATION   Name: Robert Gallo  MRN: [id number]  : 1995  Date of Service: 2022    OMFS consulted by ENT service regarding concern for dental source for cervical necrotizing fasciitis with mediastinal involvement.    ASSESSMENT:  26 year old male with no known past medical history, admitted for management of cervical necrotizing fasciitis with mediastinal involvement. Poor dentition, with multiple grossly carious teeth. Teeth #19 and #20 (mandibular left) were the most likely culprits, given their location in the mandible in relation to the most significant swelling, and the fact that there is significant purulent drainage from these sockets on exam. There are teeth on the right side, #29 and #30, which are also grossly carious, however, their location makes them a less likely source.     RECOMMENDATIONS:  - Teeth #19 and #20 were the mostly likely sources given the tracking of the infection. There are other teeth with caries, but these are less likely associated given their location.  - If the patient fails to improve or worsens in the coming days, will consider extraction of additional teeth.  - Agree with broad spectrum antibiotic coverage and trending inflammatory markers.      The patient's case was discussed with chief resident and staff.      CHIEF COMPLAINT:    Patient is intubated and sedated, unable to provide.    HISTORY OF PRESENT ILLNESS:      26 year old male who was admitted to [facility] yesterday evening. He presented to an outside hospital with several days of neck pain, and was intubated there. He was initially transferred to [facility] [place], but was then transferred to [facility] for management with ENT in conjunction with thoracic surgery. According to the patient's mother, he has no significant past medical history and she denies any drug use. She reports that he has had poor dentition for a long time. Yesterday evening, ENT and Thoracic took the patient to the OR for  incision and drainage, debridement, extraction of teeth #19 and #20, thoracotomy, mediastinotomy, and PEG tube. ENT are concerned that there may be additional teeth contributing to the infection, and request OMFS consultation to evaluate dentition.       PAST MEDICAL HISTORY:  Past Medical History:   Diagnosis Date     No known problems        PAST SURGICAL HISTORY:  History reviewed. No pertinent surgical history.    PTA MEDICATIONS:  Current Facility-Administered Medications   Medication     acetaminophen (TYLENOL) tablet 650 mg    Or     acetaminophen (TYLENOL) solution 650 mg     bisacodyl (DULCOLAX) Suppository 10 mg     clindamycin (CLEOCIN) infusion 900 mg     glucose gel 15-30 g    Or     dextrose 50 % injection 25-50 mL    Or     glucagon injection 1 mg     fentaNYL (SUBLIMAZE) 50 mcg/mL bolus from infusion pump 50 mcg     fentaNYL (SUBLIMAZE) infusion     heparin ANTICOAGULANT injection 5,000 Units     insulin aspart (NovoLOG) injection (RAPID ACTING)     lactated ringers infusion     lidocaine (LMX4) cream     lidocaine 1 % 0.1-5 mL     propofol (DIPRIVAN) infusion    And     propofol (DIPRIVAN) bolus from infusion pump 10-20 mg    And     Medication Instruction     norepinephrine (LEVOPHED) 16 mg in  mL infusion MAX CONC CENTRAL LINE     ondansetron (ZOFRAN-ODT) ODT tab 4 mg    Or     ondansetron (ZOFRAN) injection 4 mg     pantoprazole (PROTONIX) 2 mg/mL suspension 40 mg    Or     pantoprazole (PROTONIX) IV push injection 40 mg     piperacillin-tazobactam (ZOSYN) 4.5 g vial to attach to  mL bag     polyethylene glycol (MIRALAX) Packet 17 g     prochlorperazine (COMPAZINE) injection 10 mg    Or     prochlorperazine (COMPAZINE) tablet 10 mg    Or     prochlorperazine (COMPAZINE) suppository 25 mg     senna-docusate (SENOKOT-S/PERICOLACE) 8.6-50 MG per tablet 1 tablet     sodium chloride (PF) 0.9% PF flush 10-40 mL     thiamine (B-1) 200 mg in sodium chloride 0.9 % 50 mL intermittent infusion  "    vancomycin 1250 mg in 0.9% NaCl 250 mL intermittent infusion 1,250 mg     vasopressin (VASOSTRICT) 20 Units in sodium chloride 0.9 % 100 mL standard conc infusion                  ALLERGIES:     Not on File     FAMILY HISTORY:  Family History   Problem Relation Age of Onset     No Known Problems Mother      No Known Problems Father        SOCIAL HISTORY  Mother reports no tobacco or illicit drug use.  Social History     Socioeconomic History     Marital status: Single     Spouse name: Not on file     Number of children: Not on file     Years of education: Not on file     Highest education level: Not on file   Occupational History     Not on file   Tobacco Use     Smoking status: Not on file     Smokeless tobacco: Not on file   Substance and Sexual Activity     Alcohol use: Not on file     Drug use: Not on file     Sexual activity: Not on file   Other Topics Concern     Not on file   Social History Narrative     Not on file     Social Determinants of Health     Financial Resource Strain: Not on file   Food Insecurity: Not on file   Transportation Needs: Not on file   Physical Activity: Not on file   Stress: Not on file   Social Connections: Not on file   Intimate Partner Violence: Not on file   Housing Stability: Not on file       REVIEW OF SYSTEMS:  The patient is intubated and sedated, unable to provide.      OBJECTIVE/PHYSICAL EXAMINATION:  Vitals: Blood pressure 115/53, pulse 91, temperature 99.6  F (37.6  C), temperature source Axillary, resp. rate 20, height 1.9 m (6' 2.8\"), weight 88 kg (194 lb 0.1 oz), SpO2 98 %.  Constitutional: Intubated and sedated.  HEENT: There is significant left submandibular and submental swelling, extending to the right inferior border of the mandible. The swelling is firm and extends inferiorly along anterior neck to dressings.       Ears: External ears are WNL      Eyes: Pupils equal round and reactive to light.      Nose: External alae are normal, no drainage.      Mouth:   " The left buccal vestibule is firmly distended. Significant purulence exuding from extraction sites #19 and #20. FOM is distended and the tongue is elevated. Unable to visualize oropharynx. The dentition is poor, with multiple grossly carious teeth.  Neck: Firm edema tracking from left submandibular and submental areas inferiorly down the anterior neck.  Cardiovascular: Regular rate and rhythm.   Pulmonary: Intubated on vent.  GI: Not examined.  : Not examined.  Musculoskeletal: Extremities warm and well-perfused.  Neurologic: Intubated and sedated.  Skin: Warm and damp.    LABORATORY, PATHOLOGY, AND RADIOLOGY DATA:  Lab results:   CBC RESULTS:   Recent Labs   Lab Test 22  0557   WBC 12.4*   RBC 3.33*   HGB 10.0*   HCT 30.6*   MCV 92   MCH 30.0   MCHC 32.7   RDW 14.2          Last Basic Metabolic Panel:  Lab Results   Component Value Date     2022     2022      Lab Results   Component Value Date    POTASSIUM 4.0 2022    POTASSIUM 4.0 2022    POTASSIUM 3.8 2022     Lab Results   Component Value Date    CHLORIDE 108 2022     Lab Results   Component Value Date    OLYA 7.8 2022     Lab Results   Component Value Date    CO2 25 2022     Lab Results   Component Value Date    BUN 31 2022     Lab Results   Component Value Date    CR 1.12 2022     Lab Results   Component Value Date     2022     2022            Imaging:  CT Neck pushed through PACS from [facility] [place] demonstrates edema, fluid collection, and gas tracking from left sublingual, submandibular, and submental spaces along anterior neck inferiorly towards mediastinum. There are multiple grossly carious teeth of the mandible: #19, 20, 29, and 30.        SIGNATURE:  Anastacia Presley DDS  Oral & Maxillofacial Surgery Resident, G1  [phone]

## 2022-05-04 NOTE — PROGRESS NOTES
SURGICAL ICU PROGRESS NOTE  05/04/2022      PRIMARY TEAM: Thoracic   PRIMARY PHYSICIAN: Dr. Abundio Patel   REASON FOR CRITICAL CARE ADMISSION: respiratory and hemodynamic monitoring   ADMITTING PHYSICIAN: Dr. Sheldon   Date of Service (when I saw the patient): 05/04/2022    ASSESSMENT:  Robert Gallo is a 26 year old male who was admitted on 5/4/2022 with necrotizing soft tissue infection of the neck and mediastinum now POD 0 s/p BILATERAL VIDEO ASSISTED THORACOTOMY. RIGHT ANTERIOR MEDIASTINOTYOMY. PEG TUBE. INCISION AND DRAINAGE PARAPHARYNGEAL ABCESS ON RIGHT AND LEFT, remaining intubated and sedated per ENT primary.    PLAN:    Changes today:  - Discontinue feldman  - PICC   - SQH  - HIV, Hepatitis panel  - repeat ABG    Neurological:  # Acute pain   - Monitor neurological status. Delirium preventions and precautions.   - Pain: fentanyl     - Sedation plan: propofol     Pulmonary:   # Post operative ventilatory support  - VENT: Continue full vent support. Currently 550/20/50%/8. PST when meets criteria.  Ventilatory bundle. HOB elevation  - Supplemental oxygen to keep saturation above 92 %.  - Incentive spirometer every 15- 30 minutes when awake and extubated   - chest tube x2 placed to suction, will confirm with thoracic  - chest and neck packing will be changed by primary teams     Cardiovascular:    # Shock-distributive   - Monitor hemodynamic status via arterial line   - Norepinephrine, wean as able     Gastroenterology/Nutrition:  - Bowel regimen- senna and miralax  - NPO  - PEG ok to use, can clamp  - Follow up Nutrition recommendations for tube feeds- trickle feeds while on pressors    Fluids/Electrolytes:   # Hypokalemia   # hypocalcemia   - LR at 100 for IV fluid hydration, can discontinue once tube feeds are going    Renal:  - Cr 1.13 OSH, repeat Cr 1.12  - Urine output is adequate. Will continue to monitor intake and output.  - Discontinue feldman    Endocrine:  # Stress hyperglycemia   - Sliding scale  for glucose management. Goal to keep BG< 180 for optimal wound healing   - Follow up hemoglobin A1C     ID:  # Necrotizing soft tissue infection of the odontogenic facial region with mediastinal involvement   # Septic thrombophlebitis of the left IJ  # Leukocytosis   # Septic Shock   - Vancomycin, zosyn, clindamycin   - OSH WBC 10, repeat 12.4  - Follow up blood cx- NGTD  - Soft tissue cx- positive for Gram + bacilli and Gram - bacilli  - ENT with plan for take back in approximately 72 hrs  - Repeat neck and chest CT in 48 hours   - OMFS consult- appreciate recommendations  - ID consult- appreciate recommendations  - CRP/ESR    Heme:     # Acute blood loss anemia   # elevated Fibrinogen   - Transfuse if hgb <7.0 or signs/symptoms of hypoperfusion. Monitor and trend.     Musculoskeletal:  - Physical and occupational therapy consult     Skin:  Routine SICU cares     General Cares/Prophylaxis:    DVT Prophylaxis: SQH, SCDs  GI Prophylaxis: PPI  Restraints: Restraints for medical healing needed: NO    Lines/ tubes/ drains:  - ETT, remove feldman, left PIV x2 right PIV, right groin triple lumen and a-line, 2 chest tubes, g-tube  - Consult for PICC, remove right groin catheter after PICC placement    Disposition:  - Surgical ICU.     Patient seen, findings and plan discussed with surgical ICU staff, Dr. Hemphill.    Mica Olmedo MD, PhD  Neurosurgery PGY-1    - - - - - - - - - - - - - - - - - - - - - - - - - - - - - - - - - - - - - - - - - - - - - -   Subjective: Patient sedated and intubated. No acute concerns after OR.     REVIEW OF SYSTEMS: unable to obtain     PHYSICAL EXAMINATION:  Temp:  [99.6  F (37.6  C)] 99.6  F (37.6  C)  Pulse:  [89-93] 92  Resp:  [19-20] 20  BP: (115)/(53) 115/53  MAP:  [62 mmHg-83 mmHg] 76 mmHg  Arterial Line BP: ()/(44-63) 107/57  FiO2 (%):  [50 %-60 %] 50 %  SpO2:  [95 %-98 %] 96 %  General: intubated, sedated   HEENT: wrapped in Kerlix no strike through   Neuro: paralyzed, intubated,  sedated   Pulm/Resp: CMV vented   CV: RRR, distal pulses palpable   Abdomen: Soft, non-distended, non-tender, no rebound tenderness or guarding, no masses  : feldman catheter in place, urine yellow and clear  Incisions/Skin: packed and clean   MSK/Extremities: No peripheral edema, moving all extremities, calves equal, extremities well perfused    LABS: Reviewed.   Arterial Blood Gases   Recent Labs   Lab 05/04/22 0555 05/04/22 0210   PH 7.28* 7.39   PCO2 51* 38   PO2 136* 444*   HCO3 24 23     Complete Blood Count   Recent Labs   Lab 05/04/22  0557 05/04/22 0217 05/04/22 0210   WBC 12.4*  --   --    HGB 10.0*  --  9.7*    199  --      Basic Metabolic Panel  Recent Labs   Lab 05/04/22 0557 05/04/22  0535 05/04/22 0210     --  137   POTASSIUM 4.0  4.0  --  3.8   CHLORIDE 108  --   --    CO2 25  --   --    BUN 31*  --   --    CR 1.12  --   --    * 130* 122*     Liver Function Tests  Recent Labs   Lab 05/04/22  0557 05/04/22 0217   AST 32  --    ALT 26  --    ALKPHOS 93  --    BILITOTAL 1.5*  --    ALBUMIN 2.0*  --    INR 1.41* 1.36*     Pancreatic Enzymes  No lab results found in last 7 days.  Coagulation Profile  Recent Labs   Lab 05/04/22 0557 05/04/22 0217   INR 1.41* 1.36*   PTT 39* 44*       IMAGING:  Recent Results (from the past 24 hour(s))   XR Chest Port 1 View    Impression    RESIDENT PRELIMINARY INTERPRETATION  IMPRESSION:  1. Endotracheal tube projects at the mid to upper thoracic trachea  2. surgical material noted in the neck and over the right perihilar  region.

## 2022-05-04 NOTE — PROGRESS NOTES
CLINICAL NUTRITION SERVICES - ASSESSMENT NOTE     Nutrition Prescription    RECOMMENDATIONS FOR MDs/PROVIDERS TO ORDER:  Total fluids/fluid adjustments per team.    Malnutrition Status:    Unable to determine due to limited weight history and no nutrition history obtained.    Recommendations already ordered by Registered Dietitian (RD):  Enteral nutrition: Use dosing weight 88 kg  - Initiate feeds: Osmolite 1.5 continuous infusion @15 ml/hr   Advance by 10 mL/hr q 8hrs as tolerated to goal:  Osmolite 1.5 continuous infusion with goal rate of 65 ml/hr (1560 ml/day) to provide: 2340 kcals, 98 g PRO, 1189 ml free H20, 318 g CHO, and 0 g fiber daily.  Modular: 2 packet Prosource modular BID for total provisions of 2580 Kcals (29 Kcal/kg), 142 gm pro (1.6 gm/kg)  - Do not start or advance TF unless Mg++ >1.5, K+ is >3, and phos >2  - Recommend 30 ml q4hr fluid flushes for tube patency. Additional fluids and/or adjustments per MD.    - Order multivitamin/mineral (15 ml/day via FT) to help ensure micronutrient needs being met with suspected hypermetabolic demands and potential interruptions to TF infusions.   - HOB >30-45 degrees if FT is gastric    Future/Additional Recommendations:  Monitor pressor needs, TF tolerance, weight trends, fluid and electrolyte status     REASON FOR ASSESSMENT  Robert Gallo is a 26 year old male assessed by the dietitian for Provider Order - Registered Dietitian to Assess and Order TF per Medical Nutrition Therapy Protocol    Chart reviewed: Past medical history unknown. [place] [facility] [place] [facility] [place] with necrotizing fasciitis, Lemierre's syndrome. Initially presented at OSH with neck pain and found to have neck abscess, dental abscess, progression into mediastinum, clot in left internal jugular.     Now s/p bilateral video assisted thracotomy, right anterior mediastoinotyomy, PEG placement, incision and drainage of parapharyngeal abscess.    NUTRITION HISTORY  Unable to obtain nutrition  "history as pt intubated and sedated.    CURRENT NUTRITION ORDERS  Diet: NPO    PEG placed 5/4.    LABS  Na+, K+, Mg++, Phos WNL  Glucose range 122-131 past 12 hours  BUN 31 (H), Cr WNL  Total bili 1.5 (H)  ALT, AST, Alk Phos WNL   (H)    MEDICATIONS  Novolog medium sliding scale Q4 hours  Protonix  Senna-docusate  Fentanyl  Lactated ringers 100 mL/hr  Norepinephrine  Propofol  High replacement protocols for magnesium, phosphorus, potassium      ANTHROPOMETRICS  Height: 190 cm (6' 2.803\")  Most Recent Weight: 88 kg (194 lb 0.1 oz)    IBW: 89 kg  BMI: Normal BMI  Weight History:   No weight history data available. Noted large difference between weight documented yesterday at OSH (120 kg) vs admit wt at [facility] (88 kg)    Dosing Weight: 88 kg    ASSESSED NUTRITION NEEDS  Estimated Energy Needs: 9306-9890 kcals/day (25 - 30+ kcals/kg)  Justification: Vented, wound healing  Estimated Protein Needs: 106-132 grams protein/day (1.2 - 1.5 + grams of pro/kg)  Justification: Hypercatabolism with critical illness, wound healing  Estimated Fluid Needs: 1 mL/kcal or per provider  Justification: Maintenance    PHYSICAL FINDINGS  See malnutrition section below.    MALNUTRITION  % Intake: Unable to assess  % Weight Loss: Unable to assess  Subcutaneous Fat Loss: None observed  Muscle Loss: None observed  Fluid Accumulation/Edema: None noted  Malnutrition Diagnosis: Unable to determine due to limited weight history and no nutrition history obtained.    NUTRITION DIAGNOSIS  Predicted inadequate nutrient intake related to NPO status as evidenced by PO intake prevented by intubation, dental and neck abscesses, and pt reliant on nutrition support to meet needs.    INTERVENTIONS  Implementation  Nutrition Education: Not appropriate at this time due to patient condition   Enteral Nutrition - Initiate as ordered above    Goals  Total avg nutritional intake to meet a minimum of 25 kcal/kg and 1.2 g PRO/kg daily (per dosing wt 88 kg).   "   Monitoring/Evaluation  Progress toward goals will be monitored and evaluated per protocol.    Gayle Delgadillo  Dietetic Intern

## 2022-05-04 NOTE — PROGRESS NOTES
"STAFF NOTE:  No major issues since returned from OR    Exam deeply sedated  Both chest tubes to suction  Large neck incision is packed; mediastinal wound dressed without concerning fidnings  Feldman with good, clear urine  Femoral CVC  Multiple tattoos that appear to be done at home    Labs notable for normal lytes & renal function  Transaminases look fine  CK up, as expected, and CRP dramatically elevated  HgbA1c 4.8  Lactic acid normal  CBC notable for mild anemia  coags all look fine    CXR trace apical PTX with chest tube in place    This is a 26M, presumed otherwise healthy, who presents with bilateral dental / pharyngeal abscesses and concern for a necrotizing infection extending through the paratracheal gutters deep to strap muscles and into the mediastinum.    Today we can start tube feeds and stabilize him from a sepsis standpoint; I anticipate he will return to the OR in a day's time or so.  We plan to keep him relatively deeply sedated for the next 48h.  With stable blood pressures and UOP, we can remove the feldman for now and start tube feeds.    METABOLIC ENCEPHALOPATHY / DELIRIUM:  -due to infection, sedation; monitoring with clinical exam  -melatonin for sleep; propofol for sedation   -using non-pharmacologic methods as much as possilble    ACUTE HYPOXIC RESPIRATORY FAILURE  -with PaO2 < 120 on >40% FiO2, P/F ratio is <300.  Monitoring with continuous pulse oximetry and intermittent ABGs.  -secondary to infection  -lung protective ventilator settings with Vt <8ml/kg IBW (in this case, at 6'2\", he can get tidal volumes of 550ml); favor a higher PEEP with him    PHARYNGEAL ABSCESS:  -empiric antibiotics with zosyn, vancomycin, and clindamycin for anti-toxin effects; can potentially narrow to just zosyn  -ENT requested ID consult  -no reported illicit drug use; tox screen reported negative.  Given homemade tattoos, will look for HIV/heptatis  -plan to return to OR in 72h  -trend CRP / ESR    SEVERE " SEPSIS:  -normal lactate, but hypotensive with presumed source necrotizing pharyngeal / mediastinal infection  -empiric antibiotics as above  -norepinephrine as primary pressor as per guidelines; add vasopressin at a fixed dose of 0.04U/m as catacholamine-sparing agent if norepi dose is >0.05mcg/kg/m.    -thiamine 200mg IV q12h for minimum 4d or until off pressors, and may consider hydrocortisone 50mg IV q6h to decrease pressor requirement and for potential mortality benefit as per ESPINOZA Estevez 2002 and as per APROCHSS trial, [facility] 2019, although unlike in those trials would hold fludrocortisone (and replace with as-needed fluid boluses instead) and will use a rapid steroid taper (likely halving on a daily basis over 3d) once off pressors.     ACUTE BLOOD LOSS ANEMIA:  -no current indication for transfusion    SEVERE PROTEIN-CALORIE MALNUTRITION:  -patient demonstrates obvious muscle wasting, especially notable in the triceps with measurably reduced hand .  Family reports significant weight loss in the weeks prior to admission.  -may be at risk for refeeding syndrome; watch Phos+ & Mg++    MISC:  -Code status is full code  -mother Eloina updated by resident  -SQH can start today; PPI for intubation  -lines: femoral CVC can be replaced by PICC today (no subclavian or internal jugular given potential for them interfering with surgical site or being contaminated by nearby open wounds  -feldman can come out between now and return to OR  -anticipate discharge to acute rehab in >>1 week    Billing statement: 37min of critical care time; spent in an initial review of imaging, labs, physical exam, and discussion of the patient with my own team and the extended care team including the primary service; Based on this patient's presentation / recent intervention and my bedside assessment, I felt there was or is a reasonably high probability of imminent or life-threatening deterioration today or tonight for septic  reasons.   My  overall critical care time, as described in detail above, includes such things as coordination of care, arrhythmia and hemodynamics management with infusions of medicines, respiratory management, fluid therapy including fluid boluses, and pain and sedation therapy. This time excludes time I spent personally performing or supervising procedures for this patient.    OPAL Hemphill MD  Clinical   Anesthesia / Critical Care  [phone]

## 2022-05-04 NOTE — PHARMACY-VANCOMYCIN DOSING SERVICE
"Pharmacy Vancomycin Initial Note  Date of Service May 4, 2022  Patient's  1995  26 year old male    Indication: Necrotizing skin and soft tissue infection    Current estimated CrCl = Estimated Creatinine Clearance: 122.2 mL/min (based on SCr of 1.12 mg/dL).    Creatinine for last 3 days  2022:  5:57 AM Creatinine 1.12 mg/dL    Recent Vancomycin Level(s) for last 3 days  No results found for requested labs within last 72 hours.      Vancomycin IV Administrations (past 72 hours)                   vancomycin (VANCOCIN) 2,500 mg in sodium chloride 0.9 % 250 mL intermittent infusion (mg) 2,500 mg New Bag 22 0641                Nephrotoxins and other renal medications (From now, onward)    Start     Dose/Rate Route Frequency Ordered Stop    22 1900  vancomycin 1250 mg in 0.9% NaCl 250 mL intermittent infusion 1,250 mg         1,250 mg  over 90 Minutes Intravenous EVERY 12 HOURS 22 0852      22 0630  norepinephrine (LEVOPHED) 16 mg in  mL infusion MAX CONC CENTRAL LINE         0.01-0.6 mcg/kg/min × 88 kg (Dosing Weight)  0.8-49.5 mL/hr  Intravenous CONTINUOUS 22 0625      22 0600  vasopressin (VASOSTRICT) 20 Units in sodium chloride 0.9 % 100 mL standard conc infusion         2.4 Units/hr  12 mL/hr  Intravenous CONTINUOUS 22 0535      22 0600  piperacillin-tazobactam (ZOSYN) 4.5 g vial to attach to  mL bag        Note to Pharmacy: For [facility], [facility] and [facility]: For Zosyn-naive patients, use the \"Zosyn initial dose + extended infusion\" order panel.    4.5 g  over 30 Minutes Intravenous EVERY 6 HOURS 22 0535            Contrast Orders - past 72 hours (72h ago, onward)    None          [employer] Prediction of Planned Initial Vancomycin Regimen  AUC24,ss: 547 mg/L.hr  Probability of AUC24 > 400: 80 %  Ctrough,ss: 17.1 mg/L  Probability of Ctrough,ss > 20: 38 %  Probability of nephrotoxicity (Lodise ESTER ): 13 %          Plan:  1. Start vancomycin 2500 mg " IV x1 dose (ordered based on estimated weight of 120kg prior to admission) followed by 1250mg IV vancomycin q12h.   2. Vancomycin monitoring method: AUC  3. Vancomycin therapeutic monitoring goal: 400-600 mg*h/L  4. Pharmacy will check vancomycin levels as appropriate in 1-3 Days.    5. Serum creatinine levels will be ordered daily for the first week of therapy and at least twice weekly for subsequent weeks.      Samantha Levi, PharmD, BCCCP

## 2022-05-04 NOTE — CONSULTS
Thoracic Surgery Consult    Robert Gallo MRN# [id number]   YOB: 1995 Age: 26 year old      Date of Admission:  5/4/2022        Consult for:    Necrotizing fasciitis of neck and mediastinum        Assessment:     26 year old male admitted from [facility] with necrotizing fasciitis of neck with mediastinal involvement s/p emergent bilateral VATS, right anterior mediastinotomy, I&D of bilateral pharyngeal abscess, and PEG tube placement 5/4 with Thoracic and ENT. He remains in critical condition on NE and mechanically ventilated in the SICU.         Plan:        CT to suction    CT Chest on Friday (5/6)    Broad spectrum abx- currently on Vanc/Zosyn/Clinda    Incision cares per ENT    Discussed with staff, Dr. Patel.     Ally Barry MD  Thoracic Surgery, PGY3  [phone]           History of Present Illness:    This is a  26-year-old male with no past medical history who presents as a transfer from [facility] [place] for necrotizing fasciitis of the anterior neck. He has an odontogenic facial infection that spread throughout his neck. He was failing oral antibiotics so presented to an outside hospital where he had a CT showing skin and soft tissue infection of the anterior neck with gas formation and septic thrombophlebitis of the left IJ. He was then transferred to [facility] [place]. There was no surgical service able to take him to the OR at [facility] and was subsequently transferred here. He received vancomycin and Unasyn and clindamycin at the first hospital. On arrival to [facility] he was reportedly hemodynamically stable. Repeat CT scan was performed approximately 6 hours from the first and redemonstrates the above findings but shows significant progression into the mediastinum. He then received Zosyn, intubated due to increased neck swelling and central venous access was obtained. He received 4L of fluid prior to [facility] [place]. He was transferred here for operative intervention. At [facility] [place] his COVID was negative, LA was 1.4, WBC 10, hgb 10,  Na 135, K 3.4, Cr 1.13 and glucose 166.     Past Medical History:  No past medical history on file.    Past Surgical History:  No past surgical history on file.    Allergies:   Not on File    Medications:  No current facility-administered medications on file prior to encounter.  No current outpatient medications on file prior to encounter.      Social History:  Social History     Socioeconomic History     Marital status: Single     Spouse name: Not on file     Number of children: Not on file     Years of education: Not on file     Highest education level: Not on file   Occupational History     Not on file   Tobacco Use     Smoking status: Not on file     Smokeless tobacco: Not on file   Substance and Sexual Activity     Alcohol use: Not on file     Drug use: Not on file     Sexual activity: Not on file   Other Topics Concern     Not on file   Social History Narrative     Not on file     Social Determinants of Health     Financial Resource Strain: Not on file   Food Insecurity: Not on file   Transportation Needs: Not on file   Physical Activity: Not on file   Stress: Not on file   Social Connections: Not on file   Intimate Partner Violence: Not on file   Housing Stability: Not on file       Family History:  No family history on file.    ROS:  Limited secondary to sedation/clinical status    Exam:  SpO2 95%   General: intubated, sedated  HEENT: ETT in place  Neck: neck dressing in place  Resp: mechanical ventilation  Cardiac: RRR  Abdomen: Soft, non-distended  Extremities: No LE edema  Skin: Warm and dry  Neuro: sedated    Labs:  Most Recent CBC:   Recent Labs   Lab Test 05/04/22  0217 05/04/22 0210   HGB  --  9.7*     --      Most Recent BMP:   Recent Labs   Lab Test 05/04/22  0535 05/04/22 0210   NA  --  137   POTASSIUM  --  3.8   * 122*         Imaging:  CT from OSH  IMPRESSION:   1. Necrotizing infection centered within the left submandibular region and floor of the mouth, extending inferiorly  within the soft tissues of the anterior lateral left neck to the level of the thyroid gland, as detailed above. There is at least mild narrowing of the supraglottic airway with associated submucosal edema.  2. Edematous change within the left palatine tonsil may reflect tonsillitis, possibly the initial source of the infection.  3. Non opacification of the left internal jugular vein. While this may be on the basis of the dominant right-sided venous drainage, underlying thrombosis is not excluded.     STAFF ADDENDUM:  I saw and evaluated Mr. Gallo and agree with the resident s findings and plan of care as documented in the resident s note and edited by me, as applicable.      In summary, Mr. Gallo has cervical necrotizing fasciitis with mediastinitis. We will take him to the OR with ENT.  I spent a total of 40 minutes with Mr. Gallo, 35 of which were spent in counseling and coordination of care. The patient had all questions answered and was in agreement with the plan.  Abundio Patel MD

## 2022-05-04 NOTE — OP NOTE
Procedure Date: 05/04/2022    PREOPERATIVE DIAGNOSIS:    1.  Necrotizing fasciitis of the neck with mediastinitis.  2.  Tooth abscess.    POSTOPERATIVE DIAGNOSIS:   1.  Necrotizing fasciitis of the neck with mediastinitis.    2.  Tooth abscess.    PROCEDURE PERFORMED:     1.  Bilateral thoracoscopy.  2.  Right anterior mediastinotomy with drainage of retrosternal abscess.  3.  Percutaneous endoscopic gastrostomy tube placement.     SURGEON:  Abundio Patel MD (present for the entire procedure).    RESIDENT SURGEON:  Bret Marroquin M.D.    ANESTHESIA:  General.    ESTIMATED BLOOD LOSS:  20 mL.    COMPLICATIONS:  None immediate.    FINDINGS:  There was purulent-appearing fluid in the right pleural space and there was purulence in the retrosternal space.  We completely drained it.    DESCRIPTION OF PROCEDURE:  With the patient in supine position under general anesthesia, I first started my retrosternal dissection going down from the open neck wound from Dr. Gore's exploration.  After this, we placed a right 12 chest port, we placed a thoracoscope and then placed a second 5 port and then using a combination of cautery with thoracoscopy and digital dissection from the substernal notch at the cervical level down, we opened the retrosternal space.  I then made an anterior mediastinotomy incision in the 2nd intercostal space on the right side to allow for further digital dissection and then we completely opened up the retrosternal space and drained out a mild amount of purulence.    We opened up the retrosternal space sufficiently and then deliberately entered also the left pleural space.  We then placed a 12-port in the left pleural space as well through a small anterolateral incision on the chest wall and then examined the left pleural space and placed a 28-Icelandic chest tube on the left.  We also placed a 28-Icelandic chest tube on the right.  We irrigated it copiously,  1 liter on each side and also irrigated the  retrosternal wound with another liter.  We closed the median sternotomy incision with absorbable sutures in layers, but packed the skin open.    We then closed the skin incisions with absorbable sutures in a conventional fashion.    Abundio Patel MD        D: 2022   T: 2022   MT: BRENDON    Name:     ÁLVARO EDWARD  MRN:      [id number]        Account:        [id number]   :      1995           Procedure Date: 2022     Document: [id number]

## 2022-05-04 NOTE — PLAN OF CARE
Major Shift Events:    Pt sedated, not following commands or moving spontaneously, pupils round/reactive, tele SR, MAP >65 see MAR for interventions, CMV 40%/550/20/8, PEG in place tube feeds started at [facility] sy d/indira straight cath x1, no BM noted, chest tube x2 with serosanguinous output L>R, L neck incision with pack dressed per ENT     Plan:   Continue to monitor hemodynamic function, back to OR in 72 hrs    For vital signs and complete assessments, please see documentation flowsheets.

## 2022-05-04 NOTE — BRIEF OP NOTE
[facility]    Brief Operative Note    Pre-operative diagnosis: Necrotizing fasciits of the neck with mediastinitis  Post-operative diagnosis Necrotizing fasciits of the neck with mediastinitis    Procedure: Procedure(s):  BILATERAL VIDEO ASSISTED THORACOTOMY. RIGHT ANTERIOR MEDIASTINOTYOMY. PEG TUBE.  INCISION AND DRAINAGE PARAPHARYNGEAL ABCESS ON RIGHT AND LEFT  Surgeon: Surgeon(s) and Role:  Panel 1:     * Abundio Patel MD - Primary  Panel 2:     * Milena Gore MD - Primary     * Dorina Gomez MD - Resident - Assisting  Anesthesia: General   Estimated Blood Loss: 10 mL from 5/4/2022  1:46 AM to 5/4/2022  5:21 AM      Drains: Bilateral 28 Fr chest tubes  Specimens:   ID Type Source Tests Collected by Time Destination   1 : 2 teeth for gross Tissue Tooth/Teeth SURGICAL PATHOLOGY EXAM Abundio Patel MD 5/4/2022  4:14 AM    A : Neck Abcess  Abscess Neck ANAEROBIC BACTERIAL CULTURE ROUTINE, GRAM STAIN, FUNGAL OR YEAST CULTURE ROUTINE, AEROBIC BACTERIAL CULTURE ROUTINE Abundio Patel MD 5/4/2022  2:48 AM    B : Left Sternal thyroid tissue.  Tissue Other ANAEROBIC BACTERIAL CULTURE ROUTINE, GRAM STAIN, FUNGAL OR YEAST CULTURE ROUTINE, AEROBIC BACTERIAL CULTURE ROUTINE Abundio Patel MD 5/4/2022  3:17 AM      Findings:   Evidence of infection: deep space infection.  Complications: None.  Implants: * No implants in log *

## 2022-05-04 NOTE — BRIEF OP NOTE
[facility]    Brief Operative Note    Pre-operative diagnosis: Wound infection [T14.8XXA, L08.9]  Post-operative diagnosis Same as pre-operative diagnosis    Procedure: Procedure(s):  BILATERAL VIDEO ASSISTED THORACOTOMY. RIGHT ANTERIOR MEDIASTINOTYOMY. PEG TUBE.  Irrigation and debridement neck, combined  Surgeon: Surgeon(s) and Role:  Panel 1:     * Abundio Patel MD - Primary  Panel 2:     * Milena Gore MD - Primary     * Dorina Gomez MD - Resident - Assisting  Anesthesia: General   Estimated Blood Loss: 10 ml    Drains: Chest tubes x2  Specimens:   ID Type Source Tests Collected by Time Destination   1 : 2 teeth for gross Tissue Tooth/Teeth SURGICAL PATHOLOGY EXAM Abundio Patel MD 5/4/2022  4:14 AM    A : Neck Abcess  Abscess Neck ANAEROBIC BACTERIAL CULTURE ROUTINE, GRAM STAIN, FUNGAL OR YEAST CULTURE ROUTINE, AEROBIC BACTERIAL CULTURE ROUTINE Abundio Patel MD 5/4/2022  2:48 AM    B : Left Sternal thyroid tissue.  Tissue Other ANAEROBIC BACTERIAL CULTURE ROUTINE, GRAM STAIN, FUNGAL OR YEAST CULTURE ROUTINE, AEROBIC BACTERIAL CULTURE ROUTINE Abundio Patel MD 5/4/2022  3:17 AM      Findings:   Bilateral parapharyngeal space abscesses, submental space, paratracheal gutters, deep to strap muscles, extending into mediastinum. Left mandibular first molar and second premolar extracted  Complications: None.  Implants: * No implants in log *

## 2022-05-04 NOTE — ANESTHESIA POSTPROCEDURE EVALUATION
Patient: Robert Gallo    Procedure: Procedure(s):  BILATERAL VIDEO ASSISTED THORACOTOMY. RIGHT ANTERIOR MEDIASTINOTYOMY. PEG TUBE.  INCISION AND DRAINAGE PARAPHARYNGEAL ABCESS ON RIGHT AND LEFT       Anesthesia Type:  No value filed.    Note:  Disposition: ICU; Admission            ICU Sign Out: Anesthesiologist/ICU physician sign out WAS performed   Postop Pain Control:    PONV:    Neuro/Psych:    Airway/Respiratory: Uneventful            Sign Out: AIRWAY IN SITU/Resp. Support               Airway in situ/Resp. Support: ETT                 Reason: Planned Pre-op   CV/Hemodynamics: Uneventful            Sign Out: Acceptable CV status   Other NRE: NONE   DID A NON-ROUTINE EVENT OCCUR? No    Event details/Postop Comments:  Intubated, sedated. On pressors, hemodynamically stable           Last vitals:  Vitals Value Taken Time   /53 05/04/22 0528   Temp     Pulse 96 05/04/22 0548   Resp     SpO2 96 % 05/04/22 0548   Vitals shown include unvalidated device data.    Electronically Signed By: Jose Raines MD  May 4, 2022  5:49 AM

## 2022-05-04 NOTE — CONSULTS
SURGICAL ICU ADMISSION NOTE  05/04/2022      PRIMARY TEAM: Thoracic   PRIMARY PHYSICIAN: Dr. Abundio Patel   REASON FOR CRITICAL CARE ADMISSION: respiratory and hemodynamic monitoring   ADMITTING PHYSICIAN: Dr. Sheldon   Date of Service (when I saw the patient): 05/04/2022    ASSESSMENT:  Robert Gallo is a 26 year old male who was admitted on 5/4/2022 with necrotizing soft tissue infection of the neck and mediastinum now s/p BILATERAL VIDEO ASSISTED THORACOTOMY. RIGHT ANTERIOR MEDIASTINOTYOMY. PEG TUBE. INCISION AND DRAINAGE PARAPHARYNGEAL ABCESS ON RIGHT AND LEFT. Intra-op there was 10cc blood loss,  intra-op and 1400 of plasmalyte was given.     PLAN:    Neurological:  # Acute pain   - Monitor neurological status. Delirium preventions and precautions.   - Pain: fentanyl     - Sedation plan: propofol   - Utox     Pulmonary:   # Post operative ventilatory support  - VENT: Continue full vent support. PST when meets criteria.  Ventilatory bundle. HOB elevation   - Supplemental oxygen to keep saturation above 92 %.  - Incentive spirometer every 15- 30 minutes when awake and extubated   - chest tube x2 placed to suction, will confirm with thoracic  - chest and neck packing will be changed by primary teams     Cardiovascular:    # Shock-distributive   - Monitor hemodynamic status via arterial line   - Norepinephrine, wean as able   - Vaso ordered for additional support if needed, nursing to page if added     Gastroenterology/Nutrition:  - Bowel regimen  - NPO  - gtube meds okay, vent if needed   - Nutrition consultation in the AM  - Albumin and pre-albumin on admission to SICU     Fluids/Electrolytes:   # Hypokalemia   # hypocalcemia   - LR at 100 for IV fluid hydration  - K 3.4  - Na 135   - Repeat labs on admission to SICU    Renal:  - Cr 1.13 OSH, repeat labs will assess for JOCE   - Urine output is adequate. Will continue to monitor intake and output.  - Richard for strict I/O    Endocrine:  # Stress  hyperglycemia   - Sliding scale for glucose management. Goal to keep BG< 180 for optimal wound healing   - hemoglobin A1C     ID:  # Necrotizing soft tissue infection of the odontogenic facial region with mediastinal involvement   # Septic thrombophlebitis of the left IJ  # Leukocytosis   # Septic Shock   - Vancomycin, zosyn, clindamycin   - OSH WBC 10, LA 1.4 stat labs on admission to SICU   - STAT blood cultures on admission to SICU   - plan for take back in 72 hours   - plan for repeat neck and chest CT in 48 hours   - OMFS consult in AM   - CRP/ESR    Heme:     # Acute blood loss anemia   # elevated Fibrinogen   - Hemoglobin 9.7   - Transfuse if hgb <7.0 or signs/symptoms of hypoperfusion. Monitor and trend.   - Fibrinogen 770  - PT 18, INR 1.5   - Repeat labs on admission to SICU     Musculoskeletal:  - Physical and occupational therapy consult     Skin:  Routine SICU cares     General Cares/Prophylaxis:    DVT Prophylaxis: per day team   GI Prophylaxis: PPI  Restraints: Restraints for medical healing needed: NO    Lines/ tubes/ drains:  - ETT, Richard, left PIV x2 right PIV, right groin triple lumen and a-line, 2 chest tubes, g-tube    Disposition:  - Surgical ICU.     Patient seen, findings and plan discussed with surgical ICU staff, Dr. Sheldon.     Lori Gerard MD  General Surgery, PGY-2      Attending note:  Patient seen, examined, and discussed with the Resident physician.  All data reviewed including vital signs, medications, laboratory studies and imagining.  I agree with the assessment and plan as outlined in the above note. The patient remains critically ill with acute on respiratory failure, parapharyngeal abscess with mediastinitis, and septic shock.  I personally adjusted the patient's ventilator to treat the acute respiratory failure and titrated vasopressors to manage the septic shock.  Returned from OR, still requiring vasopressors.  ENT and Thoracic are following- may need additional  debridement.  ID consulted.    Total Critical care time excluding time for teaching and procedures was 70 minutes.     Cony Sheldon MD  [phone]    - - - - - - - - - - - - - - - - - - - - - - - - - - - - - - - - - - - - - - - - - - - - - -   HISTORY PRESENTING ILLNESS:    On review of outside medical records     This is a  26-year-old male with no past medical history who presents as a transfer from [facility] for necrotizing fasciitis of the anterior neck. He has an odontogenic facial infection that spread throughout his neck. He was failing oral antibiotics so presented to an outside hospital where he had a CT showing skin and soft tissue infection of the anterior neck with gas formation and septic thrombophlebitis of the left IJ. He was then transferred to [facility]. There was no surgical service able to take him to the OR at [facility] and was subsequently transferred here. He received vancomycin and Unasyn and clindamycin at the first hospital. On arrival to [facility] he was reportedly hemodynamically stable. Repeat CT scan was performed approximately 6 hours from the first and redemonstrates the above findings but shows significant progression into the mediastinum. He then received Zosyn, intubated due to increased neck swelling and central venous access was obtained. He received 4L of fluid prior to [facility]. He was transferred here for operative intervention. At [facility] his COVID was negative, LA was 1.4, WBC 10, hgb 10, Na 135, K 3.4, Cr 1.13 and glucose 166.       REVIEW OF SYSTEMS: unable to obtain     PAST MEDICAL HISTORY:   No past medical history on file.    SURGICAL HISTORY:   No past surgical history on file.    SOCIAL HISTORY:      FAMILY HISTORY: Not on file     ALLERGIES:   Not on File    MEDICATIONS:  No current facility-administered medications on file prior to encounter.  No current outpatient medications on file prior to encounter.      PHYSICAL EXAMINATION:     General: intubated, sedated   HEENT: wrapped in Kerlix no  strike through   Neuro: paralyzed, intubated, sedated   Pulm/Resp: CMV vented   CV: RRR, distal pulses palpable   Abdomen: Soft, non-distended, non-tender, no rebound tenderness or guarding, no masses  : feldman catheter in place, urine yellow and clear  Incisions/Skin: packed and clean   MSK/Extremities: No peripheral edema, moving all extremities, calves equal, extremities well perfused    LABS: Reviewed.   Arterial Blood Gases   Recent Labs   Lab 05/04/22 0210   PH 7.39   PCO2 38   PO2 444*   HCO3 23     Complete Blood Count   Recent Labs   Lab 05/04/22 0217 05/04/22 0210   HGB  --  9.7*     --      Basic Metabolic Panel  Recent Labs   Lab 05/04/22 0210      POTASSIUM 3.8   *     Liver Function Tests  Recent Labs   Lab 05/04/22 0217   INR 1.36*     Pancreatic Enzymes  No lab results found in last 7 days.  Coagulation Profile  Recent Labs   Lab 05/04/22 0217   INR 1.36*   PTT 44*       IMAGING:  No results found for this or any previous visit (from the past 24 hour(s)).

## 2022-05-04 NOTE — PROCEDURES
[facility]    Double Lumen PICC Placement    Date/Time: 5/4/2022 12:33 PM  Performed by: Shelby Barreto RN  Authorized by: Zenobia Urena PA-C   Indications: vascular access      UNIVERSAL PROTOCOL   Site Marked: Yes  Prior Images Obtained and Reviewed:  Yes  Required items: Required blood products, implants, devices and special equipment available    Patient identity confirmed:  Arm band and hospital-assigned identification number  NA - No sedation, light sedation, or local anesthesia  Confirmation Checklist:  Patient's identity using two indicators, relevant allergies, procedure was appropriate and matched the consent or emergent situation and correct equipment/implants were available  Time out: Immediately prior to the procedure a time out was called    Universal Protocol: the Joint Commission Universal Protocol was followed    Preparation: Patient was prepped and draped in usual sterile fashion       ANESTHESIA    Anesthesia: Local infiltration  Local Anesthetic:  Lidocaine 1% without epinephrine  Anesthetic Total (mL):  3.5      SEDATION    Patient Sedated: No        Preparation: skin prepped with ChloraPrep  Skin prep agent: skin prep agent completely dried prior to procedure  Sterile barriers: maximum sterile barriers were used: cap, mask, sterile gown, sterile gloves, and large sterile sheet  Hand hygiene: hand hygiene performed prior to central venous catheter insertion  Type of line used: PICC and Power PICC  Catheter type: double lumen  Lumen type: non-valved  Catheter size: 5 Fr  Brand: Bard  Lot number: [id number]  Placement method: venipuncture, MST, ultrasound and tip navigation system  Number of attempts: 1  Difficulty threading catheter: no  Successful placement: yes  Orientation: left    Location: basilic vein (0.57 cm vein diameter)  Arm circumference: adults 10 cm  Extremity circumference: 30  Visible catheter length: 1  Total catheter  length: 47  Dressing and securement: adhesive securement device, alcohol impregnated caps, blood cleaned with CHG, chlorhexidine disc applied, line secured, site cleansed, statlock, glue, sterile dressing applied and transparent dressing  Post procedure assessment: blood return through all ports, free fluid flow and placement verified by 3CG technology  PROCEDURE   Patient Tolerance:  Patient tolerated the procedure well with no immediate complicationsDescribe Procedure: PICC was verified, ready to use.

## 2022-05-04 NOTE — CONSULTS
"[facility]  General ID Service Consult      Patient: Robert Gallo  YOB: 1995, MRN: [id number]  Date of Admission:  5/4/2022  Date of Consult: 05/04/2022  Consult Requested by: Abundio Patel*  Admission Diagnosis: Necrotizing soft tissue infection [M79.89]  Consult Question: \"necrotizing fasciitis of the neck/chest s/p debridement\"    ID Assessment & Plan     ASSESSMENT: 26-year-old gentleman with history of dental caries presenting with complicated necrotizing soft tissue infection of the neck and mediastinum.  This is most consistent with complicated Ludewig's angina.  Now status post operative debridement with ENT and thoracic surgery on May 4.    DISCUSSION:   Initial consultation was with concern for antibiotic management and concern for potential immunodeficiency leading to the severity of this illness however this type of infection can occur in otherwise healthy adults and unfortunately is likely due to his lack of dental insurance and delayed dental care.  The remainder of his history is relatively unknown but primary team is obtaining HIV testing which I feel is appropriate.  As for his ongoing infection, and this is primarily a surgical intervention may require multiple debridements and close clinical monitoring.  Antibiotics are secondary.  Typically, Unasyn alone is sufficient however as we are early in this case we can continue his current regimen aside from the clindamycin.  Clindamycin is likely not needed as he was receiving this and progressed and there is a moderate amount of clindamycin resistance in odontogenic infections.    RECOMMENDATION:  1.  Continue Zosyn.  Will narrow based on culture results  2.  MRSA nares is negative, can discontinue vancomycin  3.  Continue trending inflammatory markers including white blood cell count and CRP  4.  Agree with serial exams, imaging and debridement  5.  Discontinue clindamycin.  6.  " ID will review cultures as they return and will check with [facility] and [facility] to see if additional blood cultures turn positive.   [place] has cultures drawn at 6PM on 5/3 that are no growth as of 5/4 11AM  7. Agree with checking HIV, Hep A ab, Hep B core, Hep B surface Ab/ag, and Hep C ab    The patient's care was discussed with the Attending Physician, Dr. Morris, Patient's Family and Primary team.    Yg Douglas MD  [facility]  Infectious Disease Fellow  ______________________________________________________________________    Chief Complaint   Necrotizing fasciitis    Unable to obtain a history from the patient due to critical condition.  Some details provided by his mother.    History of Present Illness   Robert Gallo is a 26 year old male who has no known past medical history.  Per his mother he has had issues with his teeth for quite some time but had delayed care due to lack of dental insurance.  From what we can tell from outside records he presented to an outpatient clinic on May 2 with dental pain.  He was prescribed clindamycin and coordination with dentist was attempted.  He was afebrile at that time but tachycardic to 118.      He then presented back to [facility] the evening of May 3 and was febrile to 102.5.  He had taken approximately 4 tablets of the clindamycin but continued to have pain and swelling of the left side of his jaw.  He was developing difficulty eating drinking and swallowing.  He had pain down to his collarbone.  CT imaging showed necrotizing infection in the left submandibular region and floor of the mouth which extended inferiorly into the soft tissues of the anterior lateral left neck to the level of the thyroid.  Antibiotics given were Unasyn, clindamycin, and vancomycin.  He developed shortness of breath and hypoxia and was intubated due to airway edema.  Air transportation was obtained due to lack of  ground transportation and he was transferred to [facility]      At [facility] Unasyn was broadened to Zosyn and consultation was obtained however due to the lack of thoracic surgery he was ultimately transferred to [facility].    Patient was admitted at [facility] on 5/4 and underwent combined procedure with ENT and thoracic surgery.  Initial notes from the surgeons report infection and abscesses along bilateral parapharyngeal space, submental space, peritracheal gutters extending into the mediastinum.  The right pleural space and retrosternal spaces also had purulence noted which was drained and irrigated.  Bilateral chest tubes were placed and his neck was packed and left open.    ID was consulted for antibiotic management.  No additional information was obtained from patient due to sedation.    Review of Systems   Review of systems not obtained due to patient factors - critical condition    Past Medical History    No past medical history on file.    Past Surgical History   No past surgical history on file.    Social History      Social history is minimal.  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  It is unclear if he uses tobacco products or other drugs.  His urine tox was positive for cannabinoids.  He lives in [place]  and his friend Randall may be his roommate who did not answer my phone call.      Family History   I have reviewed this patient's family history and updated it with pertinent information if needed.  Family History   Problem Relation Age of Onset     No Known Problems Mother      No Known Problems Father      Unable to obtain due to: critical condition    Medications   No medications prior to admission.     Allergies   Not on File    Physical Exam   Vital Signs: Temp: 99.6  F (37.6  C) Temp src: Axillary BP: 115/53 Pulse: 91   Resp: 20 SpO2: 98 % O2 Device: Mechanical Ventilator    Weight: 194 lbs .08 oz    Constitutional: sedated, unarousable, critically ill appearing  ENT: Intubated.   Generalized edema of the neck.  Large open neck incision with packing in place.  Respiratory: Coarse anterior breath sounds.  Bilateral anterior chest tubes with blood-tinged output.  Cardiovascular: Tachycardic but without irregular rhythm or murmur  GI: No scars, normal bowel sounds, soft, non-distended, non-tender, no masses palpated, no hepatosplenomegally  Genitounirinary: Normal appearing external genitalia with Richard in place.  Right femoral line  Skin: Various tattoos on the skin none appearing recent.  No rashes or bruising  Musculoskeletal: There is no redness, warmth, or swelling of the joints.  Neurologic: Unresponsive  Lines: Right femoral line.  Upper extremity PIV's.  Left radial arterial line -all well-appearing      Data   Inflammatory Markers   Recent Labs   Lab Test 05/04/22  0804 05/04/22  0557   SED 80* 69*   .0* 330.0*        Hematology Studies   Recent Labs   Lab Test 05/04/22  0557 05/04/22  0217 05/04/22  0210   WBC 12.4*  --   --    HGB 10.0*  --  9.7*   MCV 92  --   --     199  --        Metabolic Studies   Recent Labs   Lab Test 05/04/22  0557 05/04/22  0210    137   POTASSIUM 4.0  4.0 3.8   CHLORIDE 108  --    CO2 25  --    BUN 31*  --    CR 1.12  --    GFRESTIMATED >90  --        Hepatic Studies    Recent Labs   Lab Test 05/04/22  0557   BILITOTAL 1.5*   ALKPHOS 93   ALBUMIN 2.0*   AST 32   ALT 26

## 2022-05-04 NOTE — H&P (VIEW-ONLY)
Thoracic Surgery Consult    Robert Gallo MRN# [id number]   YOB: 1995 Age: 26 year old      Date of Admission:  5/4/2022        Consult for:    Necrotizing fasciitis of neck and mediastinum        Assessment:     26 year old male admitted from [facility] with necrotizing fasciitis of neck with mediastinal involvement s/p emergent bilateral VATS, right anterior mediastinotomy, I&D of bilateral pharyngeal abscess, and PEG tube placement 5/4 with Thoracic and ENT. He remains in critical condition on NE and mechanically ventilated in the SICU.         Plan:        CT to suction    CT Chest on Friday (5/6)    Broad spectrum abx- currently on Vanc/Zosyn/Clinda    Incision cares per ENT    Discussed with staff, Dr. Patel.     Ally Barry MD  Thoracic Surgery, PGY3  [phone]           History of Present Illness:    This is a  26-year-old male with no past medical history who presents as a transfer from [facility] for necrotizing fasciitis of the anterior neck. He has an odontogenic facial infection that spread throughout his neck. He was failing oral antibiotics so presented to an outside hospital where he had a CT showing skin and soft tissue infection of the anterior neck with gas formation and septic thrombophlebitis of the left IJ. He was then transferred to [facility]. There was no surgical service able to take him to the OR at [facility] and was subsequently transferred here. He received vancomycin and Unasyn and clindamycin at the first hospital. On arrival to [facility] he was reportedly hemodynamically stable. Repeat CT scan was performed approximately 6 hours from the first and redemonstrates the above findings but shows significant progression into the mediastinum. He then received Zosyn, intubated due to increased neck swelling and central venous access was obtained. He received 4L of fluid prior to [facility]. He was transferred here for operative intervention. At [facility] his COVID was negative, LA was 1.4, WBC 10, hgb 10,  Na 135, K 3.4, Cr 1.13 and glucose 166.     Past Medical History:  No past medical history on file.    Past Surgical History:  No past surgical history on file.    Allergies:   Not on File    Medications:  No current facility-administered medications on file prior to encounter.  No current outpatient medications on file prior to encounter.      Social History:  Social History     Socioeconomic History     Marital status: Single     Spouse name: Not on file     Number of children: Not on file     Years of education: Not on file     Highest education level: Not on file   Occupational History     Not on file   Tobacco Use     Smoking status: Not on file     Smokeless tobacco: Not on file   Substance and Sexual Activity     Alcohol use: Not on file     Drug use: Not on file     Sexual activity: Not on file   Other Topics Concern     Not on file   Social History Narrative     Not on file     Social Determinants of Health     Financial Resource Strain: Not on file   Food Insecurity: Not on file   Transportation Needs: Not on file   Physical Activity: Not on file   Stress: Not on file   Social Connections: Not on file   Intimate Partner Violence: Not on file   Housing Stability: Not on file       Family History:  No family history on file.    ROS:  Limited secondary to sedation/clinical status    Exam:  SpO2 95%   General: intubated, sedated  HEENT: ETT in place  Neck: neck dressing in place  Resp: mechanical ventilation  Cardiac: RRR  Abdomen: Soft, non-distended  Extremities: No LE edema  Skin: Warm and dry  Neuro: sedated    Labs:  Most Recent CBC:   Recent Labs   Lab Test 05/04/22  0217 05/04/22 0210   HGB  --  9.7*     --      Most Recent BMP:   Recent Labs   Lab Test 05/04/22  0535 05/04/22 0210   NA  --  137   POTASSIUM  --  3.8   * 122*         Imaging:  CT from OSH  IMPRESSION:   1. Necrotizing infection centered within the left submandibular region and floor of the mouth, extending inferiorly  within the soft tissues of the anterior lateral left neck to the level of the thyroid gland, as detailed above. There is at least mild narrowing of the supraglottic airway with associated submucosal edema.  2. Edematous change within the left palatine tonsil may reflect tonsillitis, possibly the initial source of the infection.  3. Non opacification of the left internal jugular vein. While this may be on the basis of the dominant right-sided venous drainage, underlying thrombosis is not excluded.     STAFF ADDENDUM:  I saw and evaluated Mr. Gallo and agree with the resident s findings and plan of care as documented in the resident s note and edited by me, as applicable.      In summary, Mr. Gallo has cervical necrotizing fasciitis with mediastinitis. We will take him to the OR with ENT.  I spent a total of 40 minutes with Mr. Gallo, 35 of which were spent in counseling and coordination of care. The patient had all questions answered and was in agreement with the plan.  Abundio Patel MD

## 2022-05-04 NOTE — ANESTHESIA CARE TRANSFER NOTE
Patient: Robert Gallo    Procedure: Procedure(s):  BILATERAL VIDEO ASSISTED THORACOTOMY. RIGHT ANTERIOR MEDIASTINOTYOMY. PEG TUBE.  INCISION AND DRAINAGE PARAPHARYNGEAL ABCESS ON RIGHT AND LEFT       Diagnosis: Wound infection [T14.8XXA, L08.9]  Diagnosis Additional Information: No value filed.    Anesthesia Type:   No value filed.     Note:    Oropharynx: endotracheal tube in place  Level of Consciousness: iatrogenic sedation          Vital Signs Stable: post-procedure vital signs reviewed and stable  Report to RN Given: handoff report given  Patient transferred to: ICU    ICU Handoff: Call for PAUSE to initiate/utilize ICU HANDOFF, Identified Patient, Identified Responsible Provider, Reviewed the Pertinent Medical History, Discussed Surgical Course, Reviewed Intra-OP Anesthesia Management and Issues during Anesthesia, Set Expectations for Post Procedure Period and Allowed Opportunity for Questions and Acknowledgement of Understanding      Vitals:  Vitals Value Taken Time   /53 05/04/22 0528   Temp     Pulse 95 05/04/22 0550   Resp     SpO2 96 % 05/04/22 0550   Vitals shown include unvalidated device data.    Electronically Signed By: Jose Raines MD  May 4, 2022  5:51 AM

## 2022-05-04 NOTE — ANESTHESIA PREPROCEDURE EVALUATION
Anesthesia Pre-Procedure Evaluation    Patient: Robert Gallo   MRN: [id number] : 1995        Procedure : Procedure(s):  VATS RIGHT  Irrigation and debridement neck, combined          No past medical history on file.   No past surgical history on file.   Not on File   Social History     Tobacco Use     Smoking status: Not on file     Smokeless tobacco: Not on file   Substance Use Topics     Alcohol use: Not on file      Wt Readings from Last 1 Encounters:   No data found for Wt        Anesthesia Evaluation            ROS/MED HX  ENT/Pulmonary: Comment: Per chart review, dental abscess c/b necrotizing fasciitis of the neck with mediastinal involvement. Intubated, sedated.      Neurologic:       Cardiovascular:       METS/Exercise Tolerance:     Hematologic:       Musculoskeletal:       GI/Hepatic:       Renal/Genitourinary:       Endo:       Psychiatric/Substance Use:       Infectious Disease:       Malignancy:       Other:            Physical Exam    Airway   unable to assess          Respiratory Devices and Support    ETT:      Dental    unable to assess        Cardiovascular          Rhythm and rate: regular and tachycardia     Pulmonary                   OUTSIDE LABS:  CBC:   Lab Results   Component Value Date    HGB 9.7 (L) 2022     2022     BMP:   Lab Results   Component Value Date     2022    POTASSIUM 3.8 2022     (H) 2022     COAGS: No results found for: PTT, INR, FIBR  POC: No results found for: BGM, HCG, HCGS  HEPATIC: No results found for: ALBUMIN, PROTTOTAL, ALT, AST, GGT, ALKPHOS, BILITOTAL, BILIDIRECT, TOMMY  OTHER:   Lab Results   Component Value Date    PH 7.39 2022    LACT 1.0 2022       Anesthesia Plan    ASA Status:  4, emergent       Anesthesia Type: General.     - Airway: ETT         Techniques and Equipment:     - Lines/Monitors: 2nd IV, Arterial Line, Central Line     Consents            Postoperative Care             Comments:    Other Comments: Emergent surgery. Direct transfer to OR from OSH. Patient intubated and sedated. Unable to perform ROS. Per chart biopsy, NKDA, no other known health history. No family or medical decision maker contacts available in chart. Emergent request for [facility] for medical records on CareEverywhere.    Dispo: SICU intubated, sedated.            Jose Raines MD

## 2022-05-04 NOTE — PHARMACY-CONSULT NOTE
Pharmacy Tube Feeding Consult    Medication reviewed for administration by feeding tube and for potential food/drug interactions.    Recommendation: No changes are needed at this time.     Pharmacy will continue to follow as new medications are ordered.    Samantha Levi, PharmD, BCCCP

## 2022-05-04 NOTE — PLAN OF CARE
Admitted/transferred from: OR  Reason for admission/transfer: intubated/sedated  2 RN skin assessment: completed by Lauryn SMILEY and Georgette CORDOVA  Result of skin assessment and interventions/actions: CT sites x2, incision, PEG site, feldman. Skin otherwise intact  Height, weight, drug calc weight: Done  Patient belongings (see Flowsheet)  MDRO education added to care plan: n/a    Patient arrived to  at around 0545 this morning. Propofol, fentanyl, levo, LR drips started. Vitally stable. ETT 24 @ lip; settings adjusted per ABG results. PEG tube clamped. Good feldmna output. CT x2 to -20; air leak present on R side (thoracic aware).

## 2022-05-04 NOTE — PROGRESS NOTES
ENT Progress Note    S/E  Went to OR for I/D with ENT/Thoracic 5/4AM  On clinda/vanc/zoysn  On fent/propofol  Uop 700 o/n    O  SpO2 95%   GENERAL Intubated, sedated  HEENT Orotracheally intubated, tube in right oral cavity. Left mandibular gingiva with two teeth removed, evidence of necrosis. Anterior neck packing was removed and explored. Thyroid, trachea, strap muscles, SCM visible. Irrigated with betadine and saline. Repacked with betadine-soaked packing.   PULM - mech vent 18/60/480/10    LABS  BMP WNL  Albumin 2.0  .0  Lactate 1.7  Hgb 9  INR 1.4  GS and Cx 5/4 - 2+ GPB, 1+GNB  WBC 12.4    Assessment and Plan  Robert Gallo is a 26 year old male with no significant past medical history with necrotizing fasciitis of the neck with mediastinal involvement s/p I&D of bilateral parapharyngeal spaces, sublingual space, bilateral necks, and extraction of teeth #19 and 20 and bilateral VATS and PEG tube placement by Thoracic Surgery on 5/4/2022.     Neuro:  -Pain control and sedation per SICU  HEENT:  - Plan for repeat scan on Friday, neck for us and chest for cardiothoracic  - Oral surgery consult to possibly pull more teeth  - Twice daily packing changes to the neck with Betadine soaked Kerlix to be performed by ENT  - Timing of future debridement TBD  Respiratory:  - Intubated, mechanically ventilated  CV/heme:  - hemodynamically stable  FEN/GI:  - Status post PEG tube placement by Thoracic Surgery  - LR currently at 100  - Low albumin, elevated INR, consider liver pathology  :  - Richard in place  Endo  -no active issues  Heme/ID:  - Consult infectious disease, consider immunocompromised state  -Trend CBC, CRP, and ESR  - Follow-up OR cultures  - Broad-spectrum antibiotics -currently on vancomycin, Zosyn, and clindamycin  - Repeat imaging per Thoracic - currently planning repeat imaging in 48 hours  - Start DVT ppx     PPX:  -Okay for chemical prophylaxis  - SCDs    Marcelo Blackburn MD  ENT Resident  Please  call or page ENT on call with questions or concerns

## 2022-05-05 LAB
1OH-MIDAZOLAM UR QL SCN: PRESENT
ANION GAP SERPL CALCULATED.3IONS-SCNC: 4 MMOL/L (ref 3–14)
BUN SERPL-MCNC: 23 MG/DL (ref 7–30)
CALCIUM SERPL-MCNC: 7.7 MG/DL (ref 8.5–10.1)
CHLORIDE BLD-SCNC: 111 MMOL/L (ref 94–109)
CO2 SERPL-SCNC: 27 MMOL/L (ref 20–32)
CREAT SERPL-MCNC: 0.82 MG/DL (ref 0.66–1.25)
CRP SERPL-MCNC: 440 MG/L (ref 0–8)
ERYTHROCYTE [DISTWIDTH] IN BLOOD BY AUTOMATED COUNT: 14.8 % (ref 10–15)
ERYTHROCYTE [SEDIMENTATION RATE] IN BLOOD BY WESTERGREN METHOD: 118 MM/HR (ref 0–15)
FENTANYL UR CFM-MCNC: 23 NG/ML
FENTANYL/CREAT UR: 27 NG/MG {CREAT}
GFR SERPL CREATININE-BSD FRML MDRD: >90 ML/MIN/1.73M2
GLUCOSE BLD-MCNC: 167 MG/DL (ref 70–99)
GLUCOSE BLDC GLUCOMTR-MCNC: 127 MG/DL (ref 70–99)
GLUCOSE BLDC GLUCOMTR-MCNC: 130 MG/DL (ref 70–99)
GLUCOSE BLDC GLUCOMTR-MCNC: 138 MG/DL (ref 70–99)
GLUCOSE BLDC GLUCOMTR-MCNC: 147 MG/DL (ref 70–99)
GLUCOSE BLDC GLUCOMTR-MCNC: 153 MG/DL (ref 70–99)
GLUCOSE BLDC GLUCOMTR-MCNC: 165 MG/DL (ref 70–99)
HCT VFR BLD AUTO: 25.3 % (ref 40–53)
HGB BLD-MCNC: 8.1 G/DL (ref 13.3–17.7)
HYDROMORPHONE UR CFM-MCNC: 114 NG/ML
HYDROMORPHONE/CREAT UR: 133 NG/MG {CREAT}
KETAMINE UR QL CFM: PRESENT
MAGNESIUM SERPL-MCNC: 2.5 MG/DL (ref 1.6–2.3)
MCH RBC QN AUTO: 29.5 PG (ref 26.5–33)
MCHC RBC AUTO-ENTMCNC: 32 G/DL (ref 31.5–36.5)
MCV RBC AUTO: 92 FL (ref 78–100)
PHOSPHATE SERPL-MCNC: 2.6 MG/DL (ref 2.5–4.5)
PLATELET # BLD AUTO: 192 10E3/UL (ref 150–450)
POTASSIUM BLD-SCNC: 4 MMOL/L (ref 3.4–5.3)
RBC # BLD AUTO: 2.75 10E6/UL (ref 4.4–5.9)
SODIUM SERPL-SCNC: 142 MMOL/L (ref 133–144)
WBC # BLD AUTO: 7.6 10E3/UL (ref 4–11)

## 2022-05-05 PROCEDURE — 94003 VENT MGMT INPAT SUBQ DAY: CPT

## 2022-05-05 PROCEDURE — 85652 RBC SED RATE AUTOMATED: CPT

## 2022-05-05 PROCEDURE — 99291 CRITICAL CARE FIRST HOUR: CPT | Mod: GC | Performed by: ANESTHESIOLOGY

## 2022-05-05 PROCEDURE — 87389 HIV-1 AG W/HIV-1&-2 AB AG IA: CPT | Performed by: INTERNAL MEDICINE

## 2022-05-05 PROCEDURE — 250N000013 HC RX MED GY IP 250 OP 250 PS 637

## 2022-05-05 PROCEDURE — 250N000011 HC RX IP 250 OP 636

## 2022-05-05 PROCEDURE — 258N000003 HC RX IP 258 OP 636

## 2022-05-05 PROCEDURE — 258N000003 HC RX IP 258 OP 636: Performed by: ANESTHESIOLOGY

## 2022-05-05 PROCEDURE — 250N000011 HC RX IP 250 OP 636: Performed by: DIETITIAN, REGISTERED

## 2022-05-05 PROCEDURE — 999N000157 HC STATISTIC RCP TIME EA 10 MIN

## 2022-05-05 PROCEDURE — 200N000002 HC R&B ICU UMMC

## 2022-05-05 PROCEDURE — 84100 ASSAY OF PHOSPHORUS: CPT

## 2022-05-05 PROCEDURE — 85014 HEMATOCRIT: CPT

## 2022-05-05 PROCEDURE — 86140 C-REACTIVE PROTEIN: CPT

## 2022-05-05 PROCEDURE — 250N000011 HC RX IP 250 OP 636: Performed by: THORACIC SURGERY (CARDIOTHORACIC VASCULAR SURGERY)

## 2022-05-05 PROCEDURE — 250N000011 HC RX IP 250 OP 636: Performed by: STUDENT IN AN ORGANIZED HEALTH CARE EDUCATION/TRAINING PROGRAM

## 2022-05-05 PROCEDURE — 250N000011 HC RX IP 250 OP 636: Performed by: ANESTHESIOLOGY

## 2022-05-05 PROCEDURE — 99233 SBSQ HOSP IP/OBS HIGH 50: CPT | Performed by: INTERNAL MEDICINE

## 2022-05-05 PROCEDURE — 82310 ASSAY OF CALCIUM: CPT

## 2022-05-05 PROCEDURE — 250N000013 HC RX MED GY IP 250 OP 250 PS 637: Performed by: THORACIC SURGERY (CARDIOTHORACIC VASCULAR SURGERY)

## 2022-05-05 PROCEDURE — 83735 ASSAY OF MAGNESIUM: CPT

## 2022-05-05 PROCEDURE — 250N000013 HC RX MED GY IP 250 OP 250 PS 637: Performed by: STUDENT IN AN ORGANIZED HEALTH CARE EDUCATION/TRAINING PROGRAM

## 2022-05-05 RX ORDER — FUROSEMIDE 10 MG/ML
10 INJECTION INTRAMUSCULAR; INTRAVENOUS ONCE
Status: COMPLETED | OUTPATIENT
Start: 2022-05-05 | End: 2022-05-05

## 2022-05-05 RX ADMIN — PIPERACILLIN SODIUM AND TAZOBACTAM SODIUM 4.5 G: 4; .5 INJECTION, POWDER, LYOPHILIZED, FOR SOLUTION INTRAVENOUS at 17:40

## 2022-05-05 RX ADMIN — INSULIN ASPART 1 UNITS: 100 INJECTION, SOLUTION INTRAVENOUS; SUBCUTANEOUS at 19:34

## 2022-05-05 RX ADMIN — PROPOFOL 45 MCG/KG/MIN: 10 INJECTION, EMULSION INTRAVENOUS at 09:46

## 2022-05-05 RX ADMIN — HYDROCORTISONE SODIUM SUCCINATE 50 MG: 100 INJECTION, POWDER, FOR SOLUTION INTRAMUSCULAR; INTRAVENOUS at 03:59

## 2022-05-05 RX ADMIN — Medication 2 PACKET: at 12:19

## 2022-05-05 RX ADMIN — HEPARIN SODIUM 5000 UNITS: 5000 INJECTION, SOLUTION INTRAVENOUS; SUBCUTANEOUS at 12:05

## 2022-05-05 RX ADMIN — CLINDAMYCIN IN 5 PERCENT DEXTROSE 900 MG: 18 INJECTION, SOLUTION INTRAVENOUS at 00:02

## 2022-05-05 RX ADMIN — CLINDAMYCIN IN 5 PERCENT DEXTROSE 900 MG: 18 INJECTION, SOLUTION INTRAVENOUS at 09:14

## 2022-05-05 RX ADMIN — POTASSIUM & SODIUM PHOSPHATES POWDER PACK 280-160-250 MG 1 PACKET: 280-160-250 PACK at 09:18

## 2022-05-05 RX ADMIN — HEPARIN SODIUM 5000 UNITS: 5000 INJECTION, SOLUTION INTRAVENOUS; SUBCUTANEOUS at 19:33

## 2022-05-05 RX ADMIN — FUROSEMIDE 10 MG: 10 INJECTION, SOLUTION INTRAVENOUS at 14:42

## 2022-05-05 RX ADMIN — HEPARIN SODIUM 5000 UNITS: 5000 INJECTION, SOLUTION INTRAVENOUS; SUBCUTANEOUS at 03:59

## 2022-05-05 RX ADMIN — Medication 100 MCG/HR: at 06:16

## 2022-05-05 RX ADMIN — Medication 40 MG: at 09:57

## 2022-05-05 RX ADMIN — PIPERACILLIN SODIUM AND TAZOBACTAM SODIUM 4.5 G: 4; .5 INJECTION, POWDER, LYOPHILIZED, FOR SOLUTION INTRAVENOUS at 23:30

## 2022-05-05 RX ADMIN — THIAMINE HYDROCHLORIDE 200 MG: 100 INJECTION, SOLUTION INTRAMUSCULAR; INTRAVENOUS at 09:18

## 2022-05-05 RX ADMIN — SODIUM CHLORIDE, POTASSIUM CHLORIDE, SODIUM LACTATE AND CALCIUM CHLORIDE: 600; 310; 30; 20 INJECTION, SOLUTION INTRAVENOUS at 02:15

## 2022-05-05 RX ADMIN — Medication 50 MCG: at 17:18

## 2022-05-05 RX ADMIN — HYDROCORTISONE SODIUM SUCCINATE 50 MG: 100 INJECTION, POWDER, FOR SOLUTION INTRAMUSCULAR; INTRAVENOUS at 09:54

## 2022-05-05 RX ADMIN — CLINDAMYCIN IN 5 PERCENT DEXTROSE 900 MG: 18 INJECTION, SOLUTION INTRAVENOUS at 15:30

## 2022-05-05 RX ADMIN — PROPOFOL 40 MCG/KG/MIN: 10 INJECTION, EMULSION INTRAVENOUS at 17:37

## 2022-05-05 RX ADMIN — POTASSIUM & SODIUM PHOSPHATES POWDER PACK 280-160-250 MG 1 PACKET: 280-160-250 PACK at 19:33

## 2022-05-05 RX ADMIN — PIPERACILLIN SODIUM AND TAZOBACTAM SODIUM 4.5 G: 4; .5 INJECTION, POWDER, LYOPHILIZED, FOR SOLUTION INTRAVENOUS at 12:06

## 2022-05-05 RX ADMIN — Medication 10 MG: at 17:17

## 2022-05-05 RX ADMIN — SENNOSIDES AND DOCUSATE SODIUM 1 TABLET: 8.6; 5 TABLET ORAL at 09:18

## 2022-05-05 RX ADMIN — PIPERACILLIN SODIUM AND TAZOBACTAM SODIUM 4.5 G: 4; .5 INJECTION, POWDER, LYOPHILIZED, FOR SOLUTION INTRAVENOUS at 05:48

## 2022-05-05 RX ADMIN — PROPOFOL 45 MCG/KG/MIN: 10 INJECTION, EMULSION INTRAVENOUS at 13:41

## 2022-05-05 RX ADMIN — PROPOFOL 45 MCG/KG/MIN: 10 INJECTION, EMULSION INTRAVENOUS at 01:43

## 2022-05-05 RX ADMIN — HYDROCORTISONE SODIUM SUCCINATE 50 MG: 100 INJECTION, POWDER, FOR SOLUTION INTRAMUSCULAR; INTRAVENOUS at 21:20

## 2022-05-05 RX ADMIN — Medication 2 PACKET: at 19:33

## 2022-05-05 RX ADMIN — HYDROCORTISONE SODIUM SUCCINATE 50 MG: 100 INJECTION, POWDER, FOR SOLUTION INTRAMUSCULAR; INTRAVENOUS at 15:33

## 2022-05-05 RX ADMIN — THIAMINE HYDROCHLORIDE 200 MG: 100 INJECTION, SOLUTION INTRAMUSCULAR; INTRAVENOUS at 19:33

## 2022-05-05 RX ADMIN — PROPOFOL 40 MCG/KG/MIN: 10 INJECTION, EMULSION INTRAVENOUS at 22:14

## 2022-05-05 RX ADMIN — INSULIN ASPART 1 UNITS: 100 INJECTION, SOLUTION INTRAVENOUS; SUBCUTANEOUS at 23:35

## 2022-05-05 RX ADMIN — INSULIN ASPART 1 UNITS: 100 INJECTION, SOLUTION INTRAVENOUS; SUBCUTANEOUS at 03:55

## 2022-05-05 RX ADMIN — PROPOFOL 45 MCG/KG/MIN: 10 INJECTION, EMULSION INTRAVENOUS at 05:49

## 2022-05-05 RX ADMIN — SENNOSIDES AND DOCUSATE SODIUM 1 TABLET: 8.6; 5 TABLET ORAL at 19:33

## 2022-05-05 ASSESSMENT — ACTIVITIES OF DAILY LIVING (ADL)
ADLS_ACUITY_SCORE: 11

## 2022-05-05 NOTE — PROGRESS NOTES
"STAFF NOTE:  No overnight issues, but got some albumin for hypotension & feldman needed to be replaced for urinary retention  Also started \"stress steroids, and now off pressors    Exam deeply sedated, but arousable  Not really following commands  Both chest tubes to suction  Large neck incision is packed; there are parts that I think look a little dusky; ENT and thoracic are aware; mediastinal wound dressed without concerning fidnings  Feldman with good, clear urine  New PICC looks fine  Femoral A line will need to be replaced; since not returning to OR today, will just remove it  Multiple tattoos that appear to be done at home    Labs lytes & renal function are fine  CRP continues to rise  CBC notable for progressive anemia  HIV antigen, other infectious labs still pending      This is a 26M, presumed otherwise healthy, who presents with bilateral dental / pharyngeal abscesses and concern for a necrotizing infection extending through the paratracheal gutters deep to strap muscles and into the mediastinum.    Tomorrow will repeat a CT scan of his thorax in anticipation of further surgical exploration.  We can increase his tube feeds towards goal today and remove his femoral arterial line.  Will just leave the feldman in place now until OR.  His CXR is starting to look a little more wet, so will gently diurese today.    METABOLIC ENCEPHALOPATHY / DELIRIUM:  -due to infection, sedation; monitoring with clinical exam  -melatonin for sleep; propofol for sedation   -using non-pharmacologic methods as much as possilble    ACUTE HYPOXIC RESPIRATORY FAILURE  -resolving.  Keeping intubated until return to OR  -secondary to infection  -lung protective ventilator settings with Vt <8ml/kg IBW (in this case, at 6'2\", he can get tidal volumes of 550ml); favor a higher PEEP with him  -low-dose lasix today    PHARYNGEAL ABSCESS:  -empiric antibiotics with zosyn + clindamycin for anti-toxin effects (ID and I are both comfortable " narrowing to just zosyn)   -no reported illicit drug use; tox screen reported negative, but with homemade tattoos we are screening for HIV, etc.    -plan to return to OR tomorrow after CT scan  -trend CRP / ESR  -OMFS has been consulted; may require further tooth extraction    SEVERE SEPSIS:  -off pressors   -empiric antibiotics as above  -thiamine 200mg IV q12h for minimum 4d or until off pressors, started hydrocortisone 50mg IV q6h; will continue this until the OR, and then plan a rapid steroid taper (likely halving on a daily basis over 3d) once off pressors.     ACUTE BLOOD LOSS ANEMIA:  -no current indication for transfusion    SEVERE PROTEIN-CALORIE MALNUTRITION:  -aggressively replacing Phos+ & Mg++    MISC:  -Code status is full code  -mother Eloina updated by resident  -SQH can start today; PPI for intubation  -lines: femoral arterial line can come out today; PICC   -keep feldman until OR   -anticipate discharge to acute rehab in >>1 week    Billing statement: 32min of critical care time; spent in an initial review of imaging, labs, physical exam, and discussion of the patient with my own team and the extended care team including the primary service; Based on this patient's presentation / recent intervention and my bedside assessment, I felt there was or is a reasonably high probability of imminent or life-threatening deterioration today or tonight for septic  reasons.   My overall critical care time, as described in detail above, includes such things as coordination of care, arrhythmia and hemodynamics management with infusions of medicines, respiratory management, fluid therapy including fluid boluses, and pain and sedation therapy. This time excludes time I spent personally performing or supervising procedures for this patient.    OPAL Hemphill MD  Clinical   Anesthesia / Critical Care  *[phone]

## 2022-05-05 NOTE — PROGRESS NOTES
"Thoracic Surgery  Progress Note  May 5, 2022      S: Patient intubated but awake. Tracks w/eyes.    O:  /53   Pulse 66   Temp 97.88  F (36.6  C)   Resp 20   Ht 1.9 m (6' 2.8\")   Wt 88 kg (194 lb 0.1 oz)   SpO2 96%   BMI 24.38 kg/m      Physical Exam:  Gen: NAD, non-toxic appearing, intubated, eyes open  Pulm: on vent, no dyssynchrony  Chest tubes: R1 w/minimal serosanguinous output since midnight, R2 w/minimal serosanguinous output since midnight  CV: RRR, eWWP, no LE edema  Dressing: neck dressing w/visible sanguinous drainage on kerlix, no active bleeding    I&O:  I/O last 3 completed shifts:  In: 3883.99 [I.V.:3643.99; NG/GT:90]  Out: 2825 [Urine:2555; Chest Tube:270]  Chest tubes: R1: 1.0L yesterday, 0 since midnights; R2 590 yesterday, 40 since midnight     Labs:  Results for orders placed or performed during the hospital encounter of 05/04/22 (from the past 24 hour(s))   CRP inflammation   Result Value Ref Range    CRP Inflammation 350.0 (H) 0.0 - 8.0 mg/L   Erythrocyte sedimentation rate auto   Result Value Ref Range    Erythrocyte Sedimentation Rate 80 (H) 0 - 15 mm/hr   Glucose by meter   Result Value Ref Range    GLUCOSE BY METER POCT 120 (H) 70 - 99 mg/dL   Blood gas arterial   Result Value Ref Range    pH Arterial 7.37 7.35 - 7.45    pCO2 Arterial 41 35 - 45 mm Hg    pO2 Arterial 175 (H) 80 - 105 mm Hg    FIO2 50     Bicarbonate Arterial 24 21 - 28 mmol/L    Base Excess/Deficit (+/-) -1.5 -9.0 - 1.8 mmol/L   Glucose by meter   Result Value Ref Range    GLUCOSE BY METER POCT 125 (H) 70 - 99 mg/dL   Double Lumen PICC Placement    Narrative    Shelby Barreto, REBECCA     5/4/2022 12:35 PM  [facility]    Double Lumen PICC Placement    Date/Time: 5/4/2022 12:33 PM  Performed by: Shelby Barreto RN  Authorized by: Zenobia Urena PA-C   Indications: vascular access      UNIVERSAL PROTOCOL   Site Marked: Yes  Prior Images Obtained and " Reviewed:  Yes  Required items: Required blood products, implants, devices and special   equipment available    Patient identity confirmed:  Arm band and hospital-assigned identification   number  NA - No sedation, light sedation, or local anesthesia  Confirmation Checklist:  Patient's identity using two indicators, relevant   allergies, procedure was appropriate and matched the consent or emergent   situation and correct equipment/implants were available  Time out: Immediately prior to the procedure a time out was called    Universal Protocol: the Joint Commission Universal Protocol was followed    Preparation: Patient was prepped and draped in usual sterile fashion       ANESTHESIA    Anesthesia: Local infiltration  Local Anesthetic:  Lidocaine 1% without epinephrine  Anesthetic Total (mL):  3.5      SEDATION    Patient Sedated: No        Preparation: skin prepped with ChloraPrep  Skin prep agent: skin prep agent completely dried prior to procedure  Sterile barriers: maximum sterile barriers were used: cap, mask, sterile   gown, sterile gloves, and large sterile sheet  Hand hygiene: hand hygiene performed prior to central venous catheter   insertion  Type of line used: PICC and Power PICC  Catheter type: double lumen  Lumen type: non-valved  Catheter size: 5 Fr  Brand: Bard  Lot number: [id number]  Placement method: venipuncture, MST, ultrasound and tip navigation system  Number of attempts: 1  Difficulty threading catheter: no  Successful placement: yes  Orientation: left    Location: basilic vein (0.57 cm vein diameter)  Arm circumference: adults 10 cm  Extremity circumference: 30  Visible catheter length: 1  Total catheter length: 47  Dressing and securement: adhesive securement device, alcohol impregnated   caps, blood cleaned with CHG, chlorhexidine disc applied, line secured,   site cleansed, statlock, glue, sterile dressing applied and transparent   dressing  Post procedure assessment: blood return through  all ports, free fluid flow   and placement verified by 3CG technology  PROCEDURE   Patient Tolerance:  Patient tolerated the procedure well with no immediate   complicationsDescribe Procedure: PICC was verified, ready to use.   Hepatitis A antibody IgM   Result Value Ref Range    Hepatitis A Antibody IgM Nonreactive Nonreactive   Hepatitis B surface antigen   Result Value Ref Range    Hepatitis B Surface Antigen Nonreactive Nonreactive   Hepatitis B core antibody   Result Value Ref Range    Hepatitis B Core Antibody Total Nonreactive Nonreactive   Hepatitis B Surface Antibody   Result Value Ref Range    Hepatitis B Surface Antibody 759.78 <8.00 m[IU]/mL   Hepatitis C antibody   Result Value Ref Range    Hepatitis C Antibody Nonreactive Nonreactive    Narrative    Assay performance characteristics have not been established for newborns, infants, and children.   Glucose by meter   Result Value Ref Range    GLUCOSE BY METER POCT 127 (H) 70 - 99 mg/dL   Glucose by meter   Result Value Ref Range    GLUCOSE BY METER POCT 155 (H) 70 - 99 mg/dL   Glucose by meter   Result Value Ref Range    GLUCOSE BY METER POCT 160 (H) 70 - 99 mg/dL   CBC with platelets   Result Value Ref Range    WBC Count 7.6 4.0 - 11.0 10e3/uL    RBC Count 2.75 (L) 4.40 - 5.90 10e6/uL    Hemoglobin 8.1 (L) 13.3 - 17.7 g/dL    Hematocrit 25.3 (L) 40.0 - 53.0 %    MCV 92 78 - 100 fL    MCH 29.5 26.5 - 33.0 pg    MCHC 32.0 31.5 - 36.5 g/dL    RDW 14.8 10.0 - 15.0 %    Platelet Count 192 150 - 450 10e3/uL   Basic metabolic panel   Result Value Ref Range    Sodium 142 133 - 144 mmol/L    Potassium 4.0 3.4 - 5.3 mmol/L    Chloride 111 (H) 94 - 109 mmol/L    Carbon Dioxide (CO2) 27 20 - 32 mmol/L    Anion Gap 4 3 - 14 mmol/L    Urea Nitrogen 23 7 - 30 mg/dL    Creatinine 0.82 0.66 - 1.25 mg/dL    Calcium 7.7 (L) 8.5 - 10.1 mg/dL    Glucose 167 (H) 70 - 99 mg/dL    GFR Estimate >90 >60 mL/min/1.73m2   Magnesium   Result Value Ref Range    Magnesium 2.5 (H) 1.6  - 2.3 mg/dL   Phosphorus   Result Value Ref Range    Phosphorus 2.6 2.5 - 4.5 mg/dL   Erythrocyte sedimentation rate auto   Result Value Ref Range    Erythrocyte Sedimentation Rate 118 (H) 0 - 15 mm/hr   CRP inflammation   Result Value Ref Range    CRP Inflammation 440.0 (H) 0.0 - 8.0 mg/L   Glucose by meter   Result Value Ref Range    GLUCOSE BY METER POCT 165 (H) 70 - 99 mg/dL        A: Robert Gallo is a 26 year old with no significant PMHx who was admitted from [facility] with necrotizing fasciitis of neck with mediastinal involvement s/p emergent bilateral VATS, right anterior mediastinotomy, I&D of bilateral pharyngeal abscess, and PEG tube placement 5/4 with Thoracic and ENT. He remains in critical condition on NE and mechanically ventilated in the SICU.     Recs:     - Continue CTs to suction  - CT chest tomorrow (possibly include neck, per ENT)  - Remainder of cares per SICU team      Patient seen and discussed with PA who discussed with staff.     Angie Villasenor MD  PGY-1, General Surgery

## 2022-05-05 NOTE — PLAN OF CARE
Major Shift Events:  Alert to voice and following commands. Writing on whiteboard appropriate statements and questions. Moving all extremities. CMV 30%/550/20/8. Chest tubes x2 to -20 suction with serosanguinous drainage.SR/SB, afebrile. Voiding trial unsuccessful, feldman replaced d/t retention late afternoon. No Bm during shift. PEG with TF @ 30 mL/hr with q4h fwf.(Next increase 02:00 to 40 mL/hr. Goal 65 ml/hr).  B/l neck wound open with iodine packing BID changes by ENT completed this evening. Propofol @ 40, fenanyl @ 100 infusing in L. DL PICC. PIV x3. Phos replaced.  Mom updated via phone.    Plan: Return to OR for I&D later this week.     For vital signs and complete assessments, please see documentation flowsheets.       Goal Outcome Evaluation:    Plan of Care Reviewed With: patient

## 2022-05-05 NOTE — PROGRESS NOTES
[facility]  [facility] ID Service Progress Note    Patient: Robert Gallo  YOB: 1995, MRN: [id number]  Date of Admission:  5/4/2022    ID Assessment & Plan     ASSESSMENT: 26-year-old gentleman with history of dental caries presenting with complicated necrotizing soft tissue infection of the neck and mediastinum.  This is most consistent with complicated Glenn's angina. Now status post operative debridement with ENT and thoracic surgery on May 4.    DISCUSSION:   Stable. Plan for repeat imaging tomorrow.    RECOMMENDATION:  1.  Continue Zosyn.  Will narrow based on culture results  2.  Continue trending inflammatory markers including white blood cell count and CRP  3.  Agree with serial exams, imaging and debridement  4.  Primary service as elected to continue clindamycin.  5.  ID will review cultures as they return and will check with [facility] and [facility] to see if additional blood cultures turn positive.   [place] has cultures drawn at 6PM on 5/3 that are no growth as of 5/4 11AM  6. Hepatitis serologies negative, awaiting HIV results    The patient's care was discussed with the Attending Physician, Dr. Morris, Patient's Family and Primary team.    Yg Douglas MD  [facility]  Infectious Disease Fellow  ______________________________________________________________________    Interval History:    Nursing notes reviewed, no acute events overnight. Hemodynamically stable, off NE, stable vent settings. No other issues.     Review of Systems   Review of systems not obtained due to patient factors - critical condition    Physical Exam   Vital Signs: Temp: 98.7  F (37.1  C) Temp src: Axillary BP: 104/61 Pulse: 62   Resp: 20 SpO2: 97 % O2 Device: Mechanical Ventilator    Weight: 194 lbs .08 oz    Constitutional: sedated, arousable, critically ill appearing  ENT: Intubated.  Generalized edema of the  neck.  Large open neck incision with packing in place.  Respiratory: Coarse anterior breath sounds.  Bilateral anterior chest tubes with blood-tinged output.  Cardiovascular: Tachycardic but without irregular rhythm or murmur  GI: No scars, normal bowel sounds, soft, non-distended, non-tender, no masses palpated, no hepatosplenomegally  Skin: Various tattoos on the skin none appearing recent.  No rashes or bruising  Musculoskeletal: There is no redness, warmth, or swelling of the joints.      Data   Inflammatory Markers   Recent Labs   Lab Test 05/05/22 0353 05/04/22  0804 05/04/22 0557   * 80* 69*   .0* 350.0* 330.0*        Hematology Studies   Recent Labs   Lab Test 05/05/22 0353 05/04/22 0557 05/04/22 0217 05/04/22 0210   WBC 7.6 12.4*  --   --    HGB 8.1* 10.0*  --  9.7*   MCV 92 92  --   --     241 199  --        Metabolic Studies   Recent Labs   Lab Test 05/05/22 0353 05/04/22 0557 05/04/22 0210    137 137   POTASSIUM 4.0 4.0  4.0 3.8   CHLORIDE 111* 108  --    CO2 27 25  --    BUN 23 31*  --    CR 0.82 1.12  --    GFRESTIMATED >90 >90  --        Hepatic Studies    Recent Labs   Lab Test 05/04/22 0557   BILITOTAL 1.5*   ALKPHOS 93   ALBUMIN 2.0*   AST 32   ALT 26

## 2022-05-05 NOTE — PROGRESS NOTES
ORAL & MAXILLOFACIAL SURGERY   PROGRESS NOTE  Robert Gallo,  MRN: [id number],  : 1995           ASSESSMENT:  26 year old male with no known past medical history, admitted for management of cervical necrotizing fasciitis with mediastinal involvement. Poor dentition, with multiple grossly carious teeth. Teeth #19 and #20 (mandibular left) were the most likely culprits, given their location in the mandible in relation to the most significant swelling, and the fact that there is significant purulent drainage from these sockets with manipulation of submandibular soft tissue.      RECOMMENDATIONS:  - Teeth #19 and #20 were the mostly likely sources given the tracking of the infection. There are other teeth with caries, but these are less likely associated given their location.  - If the patient fails to improve or worsens in the coming days, will consider extraction of additional teeth.  - Agree with broad spectrum antibiotic coverage and trending inflammatory markers.  - OMFS will continue to follow.      Discussed with chief resident and staff.    Anastacia Presley DDS  Oral & Maxillofacial Surgery, PGY-1      Please contact the OMFS resident on-call with questions or concerns.  ____________________________________      SUBJECTIVE:  Patient is intubated and sedated. No acute events overnight.     PHYSICAL EXAM:   Vitals:    22 0530 22 0545 22 0600 22 0700   BP:       BP Location:       Pulse: 68 70  66   Resp:   20    Temp: 97.7  F (36.5  C) 97.7  F (36.5  C)  97.88  F (36.6  C)   TempSrc:       SpO2: 96% 95%  96%   Weight:       Height:            GEN: WD/WN male, NAD  HEENT: NC/AT, PERRL. There is significant left submandibular and submental swelling, extending to the right inferior border of the mandible. The swelling is firm and extends inferiorly along anterior neck. Dressings were being changed during exam, minimal bleeding from debridement site noted. Significant purulent drainage  expressed from left mandibular extraction sites with manipulation of submandibular soft tissue.  CV: RRR.  PULM: Intubated on vent  MSK: SEVILLA, no peripheral extremity edema  NEURO: Intubated and sedated, opening eyes and moving extremities.  PSYCH: Sedated    LABS:   CBC RESULTS:   Recent Labs   Lab Test 05/05/22  0353   WBC 7.6   RBC 2.75*   HGB 8.1*   HCT 25.3*   MCV 92   MCH 29.5   MCHC 32.0   RDW 14.8         Last Comprehensive Metabolic Panel:  Sodium   Date Value Ref Range Status   05/05/2022 142 133 - 144 mmol/L Final     Potassium   Date Value Ref Range Status   05/05/2022 4.0 3.4 - 5.3 mmol/L Final     Chloride   Date Value Ref Range Status   05/05/2022 111 (H) 94 - 109 mmol/L Final     Carbon Dioxide (CO2)   Date Value Ref Range Status   05/05/2022 27 20 - 32 mmol/L Final     Anion Gap   Date Value Ref Range Status   05/05/2022 4 3 - 14 mmol/L Final     Glucose   Date Value Ref Range Status   05/05/2022 167 (H) 70 - 99 mg/dL Final     GLUCOSE BY METER POCT   Date Value Ref Range Status   05/05/2022 165 (H) 70 - 99 mg/dL Final     Urea Nitrogen   Date Value Ref Range Status   05/05/2022 23 7 - 30 mg/dL Final     Creatinine   Date Value Ref Range Status   05/05/2022 0.82 0.66 - 1.25 mg/dL Final     GFR Estimate   Date Value Ref Range Status   05/05/2022 >90 >60 mL/min/1.73m2 Final     Comment:     Effective December 21, 2021 eGFRcr in adults is calculated using the 2021 CKD-EPI creatinine equation which includes age and gender (Danelle et al., NEJM, DOI: 10.1056/SAVKct2473896)     Calcium   Date Value Ref Range Status   05/05/2022 7.7 (L) 8.5 - 10.1 mg/dL Final        RADIOLOGY:  None new.

## 2022-05-05 NOTE — PROGRESS NOTES
SURGICAL ICU PROGRESS NOTE  05/05/2022      PRIMARY TEAM: Thoracic   PRIMARY PHYSICIAN: Dr. Abundio aPtel   REASON FOR CRITICAL CARE ADMISSION: respiratory and hemodynamic monitoring   ADMITTING PHYSICIAN: Dr. Sheldon   Date of Service (when I saw the patient): 05/05/2022    ASSESSMENT:  Robert Gallo is a 26 year old male who was admitted on 5/4/2022 with necrotizing soft tissue infection of the neck and mediastinum now POD 1 s/p BILATERAL VIDEO ASSISTED THORACOTOMY. RIGHT ANTERIOR MEDIASTINOTYOMY. PEG TUBE. INCISION AND DRAINAGE PARAPHARYNGEAL ABCESS ON RIGHT AND LEFT, remaining intubated and sedated per ENT primary.    PLAN:    Changes today:  - discontinue femoral arterial line  - discontinue LR  - advance tube feeds to goal    Neurological:  # Acute pain   - Monitor neurological status. Delirium preventions and precautions.   - Pain: fentanyl   - Sedation plan: propofol     Pulmonary:   # Post operative ventilatory support  - VENT: Continue full vent support. Currently 550/20/40%/8. Ventilatory bundle. HOB elevation. To remain intubated until return to OR.  - chest tube x2 continue to suction per thoracic team  - chest and neck packing to be changed by primary teams     Cardiovascular:    # Shock-distributive   - Monitor hemodynamic status via arterial line   - Norepinephrine and vasopressin, currently off  - Discontinue arterial line    Gastroenterology/Nutrition:  - Bowel regimen- senna and miralax  - NPO  - PEG ok to use, can clamp  - Osmolite 1.5; Advance tube feeds to goal    Fluids/Electrolytes:   # Hypokalemia   # hypocalcemia   - discontinue LR    Renal:  - Cr now at 0.82  - Urine output is adequate. Will continue to monitor intake and output.  - Richard was replaced due to retention, keep for now    Endocrine:  # Stress hyperglycemia   - Sliding scale for glucose management. Goal to keep BG< 180 for optimal wound healing   - HgbA1C 4.8  - Hydrocortisone 50mg q6h for stress dose steroids    ID:  #  Necrotizing soft tissue infection of the odontogenic facial region with mediastinal involvement   # Septic thrombophlebitis of the left IJ  # Leukocytosis   # Septic Shock   - zosyn, clindamycin (clinda to remain per ENT)  - WBC down to 7.6 < 12.4  - Follow up blood cx- NGTD  - Soft tissue cx- positive for Gram + bacilli and Gram - bacilli  - ENT with plan for take back TBD  - Repeat neck and chest CT 5/6 AM  - OMFS consult- no plan for further extractions at this time  - ID consult- discontinue vanc and clinda  -  up from 350, continue to trend  -  up from 80, continue to trend  - Thiamine 200 IV BID for 4 days    Heme:     # Acute blood loss anemia   # elevated Fibrinogen   - Hgb 8.1 < 10.0  - Transfuse if hgb <7.0 or signs/symptoms of hypoperfusion. Monitor and trend.     Musculoskeletal:  - Physical and occupational therapy consult     Skin:  Routine SICU cares     General Cares/Prophylaxis:    DVT Prophylaxis: SQH, SCDs  GI Prophylaxis: PPI  Restraints: Restraints for medical healing needed: NO    Lines/ tubes/ drains:  - ETT, feldman, left PIV x2 right PIV, 2 chest tubes, g-tube, PICC    Disposition:  - Surgical ICU.     Patient seen, findings and plan discussed with surgical ICU staff, Dr. Hemphill.    Mica Olmedo MD, PhD  Neurosurgery PGY-1    - - - - - - - - - - - - - - - - - - - - - - - - - - - - - - - - - - - - - - - - - - - - - -   Subjective: Patient sedated and intubated. No acute concerns, currently off pressors.     REVIEW OF SYSTEMS: unable to obtain     PHYSICAL EXAMINATION:  Temp:  [97.7  F (36.5  C)-99.9  F (37.7  C)] 97.7  F (36.5  C)  Pulse:  [66-97] 70  Resp:  [20] 20  BP: (115)/(53) 115/53  MAP:  [58 mmHg-90 mmHg] 79 mmHg  Arterial Line BP: ()/(6-66) 108/64  FiO2 (%):  [40 %-50 %] 40 %  SpO2:  [92 %-100 %] 95 %  General: intubated, sedated   HEENT: wrapped in Kerlix no strike through   Neuro: intubated, sedated   Pulm/Resp: CMV vented   CV: RRR, distal pulses palpable    Abdomen: Soft, non-distended, non-tender, no rebound tenderness or guarding, no masses  : feldman catheter in place, urine yellow and clear  Incisions/Skin: packed and clean   MSK/Extremities: No peripheral edema, moving all extremities, calves equal, extremities well perfused    LABS: Reviewed.   Arterial Blood Gases   Recent Labs   Lab 05/04/22 0918 05/04/22  0555 05/04/22 0210   PH 7.37 7.28* 7.39   PCO2 41 51* 38   PO2 175* 136* 444*   HCO3 24 24 23     Complete Blood Count   Recent Labs   Lab 05/05/22 0353 05/04/22 0557 05/04/22 0217 05/04/22 0210   WBC 7.6 12.4*  --   --    HGB 8.1* 10.0*  --  9.7*    241 199  --      Basic Metabolic Panel  Recent Labs   Lab 05/05/22 0354 05/05/22 0353 05/04/22 2338 05/04/22 2022 05/04/22  0806 05/04/22 0557 05/04/22  0535 05/04/22  0210   NA  --  142  --   --   --  137  --  137   POTASSIUM  --  4.0  --   --   --  4.0  4.0  --  3.8   CHLORIDE  --  111*  --   --   --  108  --   --    CO2  --  27  --   --   --  25  --   --    BUN  --  23  --   --   --  31*  --   --    CR  --  0.82  --   --   --  1.12  --   --    * 167* 160* 155*   < > 131*   < > 122*    < > = values in this interval not displayed.     Liver Function Tests  Recent Labs   Lab 05/04/22 0557 05/04/22 0217   AST 32  --    ALT 26  --    ALKPHOS 93  --    BILITOTAL 1.5*  --    ALBUMIN 2.0*  --    INR 1.41* 1.36*     Pancreatic Enzymes  No lab results found in last 7 days.  Coagulation Profile  Recent Labs   Lab 05/04/22 0557 05/04/22 0217   INR 1.41* 1.36*   PTT 39* 44*       IMAGING:  No results found for this or any previous visit (from the past 24 hour(s)).

## 2022-05-05 NOTE — PROGRESS NOTES
"ENT Progress Note    S/E  On zoysn, small amount of levo  On fent/propofol  PICC placed  Richard removed, appropriate Uop    O  /53   Pulse 70   Temp 97.7  F (36.5  C)   Resp 20   Ht 1.9 m (6' 2.8\")   Wt 88 kg (194 lb 0.1 oz)   SpO2 95%   BMI 24.38 kg/m     Tm 99.6, MAPS 66-70s  GENERAL Intubated, sedated  HEENT Orotracheally intubated, tube in right oral cavity. Left mandibular gingiva with two teeth removed, evidence of necrosis. Bilateral parapharyngeal space openings have less purulence today. Irrigated with betadine/saline and suctioned  Anterior neck packing was removed and explored. Thyroid and straps do look less viable today. Minimal bleeding. Irrigated with betadine and saline. Repacked with betadine-soaked packing.   PULM - mech vent 20/40/550/8    Chest tube output is decreasing    LABS  BMP Cl 111, Ca 7.7  WBC 7.6 (12.4), Hgb 8.1 (9)    Albumin 2.0   (35)   (80)  INR 1.4  GS and Cx 5/4 - 2+ GPB, 1+GNB  Hepatitis panel neg      Assessment and Plan  Robert Gallo is a 26 year old male with no significant past medical history with necrotizing fasciitis of the neck with mediastinal involvement s/p I&D of bilateral parapharyngeal spaces, sublingual space, bilateral necks, and extraction of teeth #19 and 20 and bilateral VATS and PEG tube placement by Thoracic Surgery on 5/4/2022.     Neuro:  -Pain control and sedation per SICU  HEENT:  - Plan for repeat scan on Friday, neck/chest  - Oral surgery: no further extractions currently, appreciate input  - Twice daily packing changes to the neck with Betadine soaked Kerlix to be performed by ENT  - Timing of future debridement TBD  Respiratory:  - Intubated, mechanically ventilated  CV/heme:  - hemodynamically stable  FEN/GI:  - Status post PEG tube placement by Thoracic Surgery  - OK for TF from ENT perspective  - LR currently at 100  :  - Richard in place  Endo  -hydrocortisone 50 q6 per SICU  -ISS per sicu  Heme/ID:  - ID - zosyn, " recommend keeping clinda given severity of infection  -Trend CBC, CRP, and ESR  - Follow-up OR cultures  - Repeat imaging per Thoracic - currently planning repeat imaging in 48 hours  PPX:  - subcutaneous heparin    - SCDs    Mj Erazo MD  ENT Resident    D/w Dr. Gore  Please call or page ENT on call with questions or concerns

## 2022-05-05 NOTE — PLAN OF CARE
Major Shift Events: No acute events overnight. Arouses to vigorous stimulation, follows commands once aroused, able to nod yes/no. Levo titrated off this AM. CMV 40%/550/20/8. PEG w/ TF @ 10 mL/hr, standard FWF. Richard placed d/t retention, urine dark santi in color. Neck dressing reinforced w/ ABDs d/t moderate drainage. R femoral CVC pulled.   Plan: Return to OR in the next few days for another I&D, remain intubated until then.   For vital signs and complete assessments, please see documentation flowsheets.       Goal Outcome Evaluation:    Plan of Care Reviewed With: patient     Overall Patient Progress: no change

## 2022-05-06 ENCOUNTER — APPOINTMENT (OUTPATIENT)
Dept: CT IMAGING | Facility: CLINIC | Age: 27
End: 2022-05-06
Attending: PHYSICIAN ASSISTANT
Payer: COMMERCIAL

## 2022-05-06 ENCOUNTER — APPOINTMENT (OUTPATIENT)
Dept: CT IMAGING | Facility: CLINIC | Age: 27
End: 2022-05-06
Payer: COMMERCIAL

## 2022-05-06 LAB
ANION GAP SERPL CALCULATED.3IONS-SCNC: 6 MMOL/L (ref 3–14)
BACTERIA TISS BX CULT: ABNORMAL
BACTERIA TISS BX CULT: ABNORMAL
BUN SERPL-MCNC: 25 MG/DL (ref 7–30)
CALCIUM SERPL-MCNC: 8.2 MG/DL (ref 8.5–10.1)
CHLORIDE BLD-SCNC: 112 MMOL/L (ref 94–109)
CK SERPL-CCNC: 133 U/L (ref 30–300)
CO2 SERPL-SCNC: 27 MMOL/L (ref 20–32)
CREAT SERPL-MCNC: 0.82 MG/DL (ref 0.66–1.25)
CRP SERPL-MCNC: 280 MG/L (ref 0–8)
ERYTHROCYTE [DISTWIDTH] IN BLOOD BY AUTOMATED COUNT: 15 % (ref 10–15)
ERYTHROCYTE [SEDIMENTATION RATE] IN BLOOD BY WESTERGREN METHOD: 119 MM/HR (ref 0–15)
GFR SERPL CREATININE-BSD FRML MDRD: >90 ML/MIN/1.73M2
GLUCOSE BLD-MCNC: 146 MG/DL (ref 70–99)
GLUCOSE BLDC GLUCOMTR-MCNC: 124 MG/DL (ref 70–99)
GLUCOSE BLDC GLUCOMTR-MCNC: 126 MG/DL (ref 70–99)
GLUCOSE BLDC GLUCOMTR-MCNC: 132 MG/DL (ref 70–99)
GLUCOSE BLDC GLUCOMTR-MCNC: 135 MG/DL (ref 70–99)
GLUCOSE BLDC GLUCOMTR-MCNC: 139 MG/DL (ref 70–99)
HBV DNA SERPL QL NAA+PROBE: NORMAL
HCT VFR BLD AUTO: 25 % (ref 40–53)
HCV RNA SERPL QL NAA+PROBE: NORMAL
HGB BLD-MCNC: 8 G/DL (ref 13.3–17.7)
HIV 1+2 AB+HIV1 P24 AG SERPL QL IA: NONREACTIVE
HIV1+2 RNA SERPL QL NAA+PROBE: NORMAL
LACTATE SERPL-SCNC: 0.9 MMOL/L (ref 0.7–2)
MAGNESIUM SERPL-MCNC: 2.4 MG/DL (ref 1.6–2.3)
MCH RBC QN AUTO: 29.9 PG (ref 26.5–33)
MCHC RBC AUTO-ENTMCNC: 32 G/DL (ref 31.5–36.5)
MCV RBC AUTO: 93 FL (ref 78–100)
PHOSPHATE SERPL-MCNC: 2.4 MG/DL (ref 2.5–4.5)
PLATELET # BLD AUTO: 210 10E3/UL (ref 150–450)
POTASSIUM BLD-SCNC: 3.5 MMOL/L (ref 3.4–5.3)
RBC # BLD AUTO: 2.68 10E6/UL (ref 4.4–5.9)
SODIUM SERPL-SCNC: 145 MMOL/L (ref 133–144)
TRIGL SERPL-MCNC: 226 MG/DL
WBC # BLD AUTO: 9.7 10E3/UL (ref 4–11)
WNV RNA SERPL DONR QL NAA+PROBE: NORMAL

## 2022-05-06 PROCEDURE — 250N000009 HC RX 250: Performed by: STUDENT IN AN ORGANIZED HEALTH CARE EDUCATION/TRAINING PROGRAM

## 2022-05-06 PROCEDURE — 99292 CRITICAL CARE ADDL 30 MIN: CPT | Mod: GC | Performed by: ANESTHESIOLOGY

## 2022-05-06 PROCEDURE — 82310 ASSAY OF CALCIUM: CPT

## 2022-05-06 PROCEDURE — 250N000011 HC RX IP 250 OP 636: Performed by: THORACIC SURGERY (CARDIOTHORACIC VASCULAR SURGERY)

## 2022-05-06 PROCEDURE — 250N000011 HC RX IP 250 OP 636: Performed by: STUDENT IN AN ORGANIZED HEALTH CARE EDUCATION/TRAINING PROGRAM

## 2022-05-06 PROCEDURE — 999N000157 HC STATISTIC RCP TIME EA 10 MIN

## 2022-05-06 PROCEDURE — 82550 ASSAY OF CK (CPK): CPT | Performed by: THORACIC SURGERY (CARDIOTHORACIC VASCULAR SURGERY)

## 2022-05-06 PROCEDURE — 200N000002 HC R&B ICU UMMC

## 2022-05-06 PROCEDURE — 71260 CT THORAX DX C+: CPT | Mod: 26 | Performed by: RADIOLOGY

## 2022-05-06 PROCEDURE — 70491 CT SOFT TISSUE NECK W/DYE: CPT

## 2022-05-06 PROCEDURE — 99233 SBSQ HOSP IP/OBS HIGH 50: CPT | Performed by: INTERNAL MEDICINE

## 2022-05-06 PROCEDURE — 250N000013 HC RX MED GY IP 250 OP 250 PS 637: Performed by: STUDENT IN AN ORGANIZED HEALTH CARE EDUCATION/TRAINING PROGRAM

## 2022-05-06 PROCEDURE — 84478 ASSAY OF TRIGLYCERIDES: CPT | Performed by: THORACIC SURGERY (CARDIOTHORACIC VASCULAR SURGERY)

## 2022-05-06 PROCEDURE — 85652 RBC SED RATE AUTOMATED: CPT

## 2022-05-06 PROCEDURE — 86140 C-REACTIVE PROTEIN: CPT

## 2022-05-06 PROCEDURE — 258N000003 HC RX IP 258 OP 636: Performed by: ANESTHESIOLOGY

## 2022-05-06 PROCEDURE — 87106 FUNGI IDENTIFICATION YEAST: CPT

## 2022-05-06 PROCEDURE — 71260 CT THORAX DX C+: CPT

## 2022-05-06 PROCEDURE — 70460 CT HEAD/BRAIN W/DYE: CPT | Mod: 26 | Performed by: STUDENT IN AN ORGANIZED HEALTH CARE EDUCATION/TRAINING PROGRAM

## 2022-05-06 PROCEDURE — 999N000185 HC STATISTIC TRANSPORT TIME EA 15 MIN

## 2022-05-06 PROCEDURE — 250N000011 HC RX IP 250 OP 636: Performed by: DIETITIAN, REGISTERED

## 2022-05-06 PROCEDURE — 85027 COMPLETE CBC AUTOMATED: CPT

## 2022-05-06 PROCEDURE — 250N000013 HC RX MED GY IP 250 OP 250 PS 637: Performed by: PHYSICIAN ASSISTANT

## 2022-05-06 PROCEDURE — 94003 VENT MGMT INPAT SUBQ DAY: CPT

## 2022-05-06 PROCEDURE — 999N000253 HC STATISTIC WEANING TRIALS

## 2022-05-06 PROCEDURE — 250N000009 HC RX 250: Performed by: PHYSICIAN ASSISTANT

## 2022-05-06 PROCEDURE — 250N000013 HC RX MED GY IP 250 OP 250 PS 637

## 2022-05-06 PROCEDURE — 83735 ASSAY OF MAGNESIUM: CPT

## 2022-05-06 PROCEDURE — 70460 CT HEAD/BRAIN W/DYE: CPT

## 2022-05-06 PROCEDURE — 250N000009 HC RX 250: Performed by: THORACIC SURGERY (CARDIOTHORACIC VASCULAR SURGERY)

## 2022-05-06 PROCEDURE — 99291 CRITICAL CARE FIRST HOUR: CPT | Mod: GC | Performed by: ANESTHESIOLOGY

## 2022-05-06 PROCEDURE — 87205 SMEAR GRAM STAIN: CPT | Performed by: STUDENT IN AN ORGANIZED HEALTH CARE EDUCATION/TRAINING PROGRAM

## 2022-05-06 PROCEDURE — 250N000011 HC RX IP 250 OP 636: Performed by: ANESTHESIOLOGY

## 2022-05-06 PROCEDURE — 87075 CULTR BACTERIA EXCEPT BLOOD: CPT

## 2022-05-06 PROCEDURE — 250N000011 HC RX IP 250 OP 636

## 2022-05-06 PROCEDURE — 258N000003 HC RX IP 258 OP 636: Performed by: STUDENT IN AN ORGANIZED HEALTH CARE EDUCATION/TRAINING PROGRAM

## 2022-05-06 PROCEDURE — 84100 ASSAY OF PHOSPHORUS: CPT

## 2022-05-06 PROCEDURE — 70491 CT SOFT TISSUE NECK W/DYE: CPT | Mod: 26 | Performed by: STUDENT IN AN ORGANIZED HEALTH CARE EDUCATION/TRAINING PROGRAM

## 2022-05-06 PROCEDURE — 83605 ASSAY OF LACTIC ACID: CPT | Performed by: STUDENT IN AN ORGANIZED HEALTH CARE EDUCATION/TRAINING PROGRAM

## 2022-05-06 RX ORDER — POLYETHYLENE GLYCOL 3350 17 G/17G
17 POWDER, FOR SOLUTION ORAL DAILY
Status: DISCONTINUED | OUTPATIENT
Start: 2022-05-06 | End: 2022-05-10

## 2022-05-06 RX ORDER — SODIUM CHLORIDE, SODIUM LACTATE, POTASSIUM CHLORIDE, CALCIUM CHLORIDE 600; 310; 30; 20 MG/100ML; MG/100ML; MG/100ML; MG/100ML
INJECTION, SOLUTION INTRAVENOUS CONTINUOUS
Status: ACTIVE | OUTPATIENT
Start: 2022-05-06 | End: 2022-05-06

## 2022-05-06 RX ORDER — POTASSIUM CHLORIDE 29.8 MG/ML
20 INJECTION INTRAVENOUS ONCE
Status: COMPLETED | OUTPATIENT
Start: 2022-05-06 | End: 2022-05-06

## 2022-05-06 RX ORDER — AMPICILLIN AND SULBACTAM 2; 1 G/1; G/1
3 INJECTION, POWDER, FOR SOLUTION INTRAMUSCULAR; INTRAVENOUS EVERY 6 HOURS
Status: DISCONTINUED | OUTPATIENT
Start: 2022-05-06 | End: 2022-05-08

## 2022-05-06 RX ORDER — IOPAMIDOL 755 MG/ML
100 INJECTION, SOLUTION INTRAVASCULAR ONCE
Status: COMPLETED | OUTPATIENT
Start: 2022-05-06 | End: 2022-05-06

## 2022-05-06 RX ORDER — AMOXICILLIN 250 MG
2 CAPSULE ORAL 2 TIMES DAILY
Status: DISCONTINUED | OUTPATIENT
Start: 2022-05-06 | End: 2022-05-26 | Stop reason: HOSPADM

## 2022-05-06 RX ORDER — DOXAZOSIN 2 MG/1
2 TABLET ORAL DAILY
Status: DISCONTINUED | OUTPATIENT
Start: 2022-05-06 | End: 2022-05-11

## 2022-05-06 RX ORDER — SODIUM HYPOCHLORITE 2.5 MG/ML
SOLUTION TOPICAL 2 TIMES DAILY
Status: DISCONTINUED | OUTPATIENT
Start: 2022-05-06 | End: 2022-05-26 | Stop reason: HOSPADM

## 2022-05-06 RX ORDER — POLYETHYLENE GLYCOL 3350 17 G/17G
17 POWDER, FOR SOLUTION ORAL DAILY
Status: DISCONTINUED | OUTPATIENT
Start: 2022-05-06 | End: 2022-05-06

## 2022-05-06 RX ORDER — DEXMEDETOMIDINE HYDROCHLORIDE 4 UG/ML
.1-1.2 INJECTION, SOLUTION INTRAVENOUS CONTINUOUS
Status: DISCONTINUED | OUTPATIENT
Start: 2022-05-06 | End: 2022-05-09

## 2022-05-06 RX ORDER — LIDOCAINE HYDROCHLORIDE AND EPINEPHRINE 10; 10 MG/ML; UG/ML
10 INJECTION, SOLUTION INFILTRATION; PERINEURAL ONCE
Status: COMPLETED | OUTPATIENT
Start: 2022-05-06 | End: 2022-05-06

## 2022-05-06 RX ADMIN — Medication 2 PACKET: at 21:04

## 2022-05-06 RX ADMIN — PIPERACILLIN SODIUM AND TAZOBACTAM SODIUM 4.5 G: 4; .5 INJECTION, POWDER, LYOPHILIZED, FOR SOLUTION INTRAVENOUS at 05:53

## 2022-05-06 RX ADMIN — POTASSIUM PHOSPHATE, MONOBASIC AND POTASSIUM PHOSPHATE, DIBASIC 15 MMOL: 224; 236 INJECTION, SOLUTION, CONCENTRATE INTRAVENOUS at 08:48

## 2022-05-06 RX ADMIN — CLINDAMYCIN IN 5 PERCENT DEXTROSE 900 MG: 18 INJECTION, SOLUTION INTRAVENOUS at 07:42

## 2022-05-06 RX ADMIN — PROPOFOL 40 MCG/KG/MIN: 10 INJECTION, EMULSION INTRAVENOUS at 02:12

## 2022-05-06 RX ADMIN — Medication 50 MCG: at 06:54

## 2022-05-06 RX ADMIN — HYDROCORTISONE SODIUM SUCCINATE 50 MG: 100 INJECTION, POWDER, FOR SOLUTION INTRAMUSCULAR; INTRAVENOUS at 03:55

## 2022-05-06 RX ADMIN — HYDROCORTISONE SODIUM SUCCINATE 50 MG: 100 INJECTION, POWDER, FOR SOLUTION INTRAMUSCULAR; INTRAVENOUS at 21:02

## 2022-05-06 RX ADMIN — SODIUM CHLORIDE, POTASSIUM CHLORIDE, SODIUM LACTATE AND CALCIUM CHLORIDE: 600; 310; 30; 20 INJECTION, SOLUTION INTRAVENOUS at 13:15

## 2022-05-06 RX ADMIN — POTASSIUM CHLORIDE 20 MEQ: 29.8 INJECTION, SOLUTION INTRAVENOUS at 04:52

## 2022-05-06 RX ADMIN — Medication 20 MG: at 18:00

## 2022-05-06 RX ADMIN — AMPICILLIN SODIUM AND SULBACTAM SODIUM 3 G: 2; 1 INJECTION, POWDER, FOR SOLUTION INTRAMUSCULAR; INTRAVENOUS at 17:28

## 2022-05-06 RX ADMIN — PIPERACILLIN SODIUM AND TAZOBACTAM SODIUM 4.5 G: 4; .5 INJECTION, POWDER, LYOPHILIZED, FOR SOLUTION INTRAVENOUS at 11:12

## 2022-05-06 RX ADMIN — Medication 50 MCG: at 17:40

## 2022-05-06 RX ADMIN — HYOSCYAMINE SULFATE: 16 SOLUTION at 21:05

## 2022-05-06 RX ADMIN — DOXAZOSIN 2 MG: 2 TABLET ORAL at 18:27

## 2022-05-06 RX ADMIN — THIAMINE HYDROCHLORIDE 200 MG: 100 INJECTION, SOLUTION INTRAMUSCULAR; INTRAVENOUS at 07:52

## 2022-05-06 RX ADMIN — HEPARIN SODIUM 5000 UNITS: 5000 INJECTION, SOLUTION INTRAVENOUS; SUBCUTANEOUS at 11:11

## 2022-05-06 RX ADMIN — PROPOFOL 75 MCG/KG/MIN: 10 INJECTION, EMULSION INTRAVENOUS at 22:20

## 2022-05-06 RX ADMIN — LIDOCAINE HYDROCHLORIDE,EPINEPHRINE BITARTRATE 10 ML: 10; .01 INJECTION, SOLUTION INFILTRATION; PERINEURAL at 17:45

## 2022-05-06 RX ADMIN — PROPOFOL 40 MCG/KG/MIN: 10 INJECTION, EMULSION INTRAVENOUS at 05:53

## 2022-05-06 RX ADMIN — Medication 50 MCG: at 17:59

## 2022-05-06 RX ADMIN — SODIUM CHLORIDE, POTASSIUM CHLORIDE, SODIUM LACTATE AND CALCIUM CHLORIDE: 600; 310; 30; 20 INJECTION, SOLUTION INTRAVENOUS at 02:09

## 2022-05-06 RX ADMIN — IOPAMIDOL 100 ML: 755 INJECTION, SOLUTION INTRAVENOUS at 11:41

## 2022-05-06 RX ADMIN — THIAMINE HYDROCHLORIDE 200 MG: 100 INJECTION, SOLUTION INTRAMUSCULAR; INTRAVENOUS at 21:06

## 2022-05-06 RX ADMIN — Medication 40 MG: at 08:54

## 2022-05-06 RX ADMIN — PROPOFOL 50 MCG/KG/MIN: 10 INJECTION, EMULSION INTRAVENOUS at 17:38

## 2022-05-06 RX ADMIN — PROPOFOL 75 MCG/KG/MIN: 10 INJECTION, EMULSION INTRAVENOUS at 19:59

## 2022-05-06 RX ADMIN — DEXMEDETOMIDINE HYDROCHLORIDE 0.2 MCG/KG/HR: 400 INJECTION INTRAVENOUS at 06:00

## 2022-05-06 RX ADMIN — CLINDAMYCIN IN 5 PERCENT DEXTROSE 900 MG: 18 INJECTION, SOLUTION INTRAVENOUS at 00:11

## 2022-05-06 RX ADMIN — SENNOSIDES AND DOCUSATE SODIUM 2 TABLET: 8.6; 5 TABLET ORAL at 21:03

## 2022-05-06 RX ADMIN — HEPARIN SODIUM 5000 UNITS: 5000 INJECTION, SOLUTION INTRAVENOUS; SUBCUTANEOUS at 03:55

## 2022-05-06 RX ADMIN — Medication 100 MCG/HR: at 06:54

## 2022-05-06 RX ADMIN — HEPARIN SODIUM 5000 UNITS: 5000 INJECTION, SOLUTION INTRAVENOUS; SUBCUTANEOUS at 21:03

## 2022-05-06 RX ADMIN — DEXMEDETOMIDINE HYDROCHLORIDE 0.5 MCG/KG/HR: 400 INJECTION INTRAVENOUS at 15:04

## 2022-05-06 RX ADMIN — HYDROCORTISONE SODIUM SUCCINATE 50 MG: 100 INJECTION, POWDER, FOR SOLUTION INTRAMUSCULAR; INTRAVENOUS at 11:07

## 2022-05-06 ASSESSMENT — ACTIVITIES OF DAILY LIVING (ADL)
ADLS_ACUITY_SCORE: 11

## 2022-05-06 NOTE — PROGRESS NOTES
"STAFF NOTE:  Switched from propofol to precedex (rising TGs, dosing that was concerning for propofol infusion syndrome if it were to continue to persist at these doses)    Exam easily arousable  CAM-ICU positive, but follows commands and redirectable  Both chest tubes to suction  Good cuff leak and strong cough  Edema around face markedly reduced  Large neck incision is packed; mediastinal wound packed  Richard with good, clear urine  PICC site looks fine  Multiple tattoos that appear to be done at home    Labs CRP downtrending and ESR stable  Sodium has drifted up to 145  Rest of lytes & renal function look fine  Triglycerides were 226  Lactate has completely normalized    This is a 26M, presumed otherwise healthy, who presents with bilateral dental / pharyngeal abscesses and concern for a necrotizing infection extending through the paratracheal gutters deep to strap muscles and into the mediastinum.  He has undergone thorough debridement, and based on a CT today and bedside exam, there is no further concern for an active necrotizing process.    After discussion with the ENT service, my understanding is that this point he has two separate regions of infection that do not communicate with each other: 1.) a left buccal / neck fluid collection which is non-purulent, non-necrotic, but with a slowly healing tissue bed; and 2.) bilateral pharyngeal fluid collections, with the left side extending submentally.  These pharyngeal / submental fluid collections remain purulent on exam, and the concern is that his existing ETT serves as a \"stent\" of sorts, and that he is at risk for upper airway obstruction if the ETT were to be removed while the submental/parapharyngeal infection remains active.     He therefore will remain intubated indefintely - until either the neck wound heals enohgh that the skin can be closed over it and he can receive a tracheostomy to maintain a \"stenting\" of his trachea in the face of the parapharyngeal " / submental fluid collections, or until the submental/parapharyngeal infection resolves enough that there is no longer concern for upper airway obstruction after extubation.    From my perspective, since there is no futher evidence of necrotizing infection, we no longer need the anti-toxin effects of clindamycin.  He also should not need antipseudomonal or anti-MRSA coverage.  He DOES still need broad empiric oral anaerobic, gram-negative, and gram-positive coverage, and the specific pathogenic organisms that we are growing thus far include the gram positive Strep anginosis and the gram negative anaerobe Prevotella.  Unasyn should be adequate empiric and tailored coverage for him.  He is relatively high risk for developing a VAP given that the areas of purulence will probably connect well with the mouth, and that cuff on the ETT is not going to perfectly protect him from dripping oral secretions down into the lungs.  He may therefore eventually require re-broadening of his antibiotics to cover pseudomonas & MRSA, but for today we will narrow antimicrobial coverage to just Unasyn.      We will continue to keep him intubated for airway protection, but will target a lighter plan of sedation with Precedex rather than propofol to avoid propofol infusion syndrome in this young man.  Further, from my perspective, there is value in keeping him on indefinite pressure support as long as he is not deeply sedated.  More coughing & mobilization of any aspirated secretions will be beneficial.    METABOLIC ENCEPHALOPATHY / DELIRIUM:  -due to infection, sedation; monitoring with clinical exam  -melatonin for sleep; precedex for sedation; minimize propofol     ACUTE HYPOXIC RESPIRATORY FAILURE  -resolved.  Now intubated for airway protection due to the risk of upper airway obstruction with his parapharyngeal fluid collections.  ETT will remain until either his neck wound has healed well enough to close skin (and he can thus get a  trach), or the parapharyngeal fluid collections have resolved enough to safely extubate without concern for obstruction  -indefinite pressure support is ok    PHARYNGEAL / SUBMENTAL / NECK ABSCESS:  -narrow antibiotics to Unasyn for anaerobic, GNR, and GPC coverage   -wet-to-dry bid dressing  -possible drain in left submental space by ENT   -HIV negative  -trend CRP / ESR  -OMFS has been consulted; may require further tooth extraction? - I have not had this clarified yet    SEVERE SEPSIS:  -resolved; off pressors   -antibiotics as above  -thiamine 200mg IV q12h for minimum 4d; will begin to wean hydrocortisone over 3d now that confident we are adequately addressing each area of infection     ACUTE BLOOD LOSS ANEMIA:  -no current indication for transfusion    SEVERE PROTEIN-CALORIE MALNUTRITION:  -aggressively replacing Phos+ & Mg++  -gastric feeds at goal    MISC:  -Code status is full code  -father updated by me at bedside; mother Eloina updated by resident by phone  -SQ; PPI for intubation  -lines: PICC   -will again attempt removing feldman now that he is less deeply sedated.  On doxazosin for prior urinary retention  -anticipate discharge to acute rehab in >>1 week    Billing statement: 77min of critical care time; spent in an initial review of imaging, labs, physical exam, and discussion of the patient with my own team and the extended care team including the primary service; and including family conference where we discussed prognosis and plan of care as described above. Based on this patient's presentation / recent intervention and my bedside assessment, I felt there was or is a reasonably high probability of imminent or life-threatening deterioration today or tonight for septic  reasons.   My overall critical care time, as described in detail above, includes such things as coordination of care, arrhythmia and hemodynamics management with infusions of medicines, respiratory management, fluid therapy including  fluid boluses, and pain and sedation therapy. This time excludes time I spent personally performing or supervising procedures for this patient.    OPAL Hemphill MD  Clinical   Anesthesia / Critical Care  *[phone]

## 2022-05-06 NOTE — PLAN OF CARE
Major Shift Events: No acute events overnight. Opens eyes to voice. Nods yes/no. Follows commands. Denies pain. VSS. Normotensive. Tmax 99.9. CMV 30%/550/20/8. Significant increase in ETT secretions @ 0400, now thick/dark tan, sputum sample sent. TF stopped @ 0000 for possible RTOR. LR started @ 100 mL/hr while TF are held. No BM. Urine tea colored/greenish, adequate output. K & Phos replaced, recheck tomorrow AM.   Plan: Head/neck/chest CT this AM to evaluate the need for another I&D.   For vital signs and complete assessments, please see documentation flowsheets.       Goal Outcome Evaluation:    Plan of Care Reviewed With: patient, father, mother     Overall Patient Progress: no change

## 2022-05-06 NOTE — OP NOTE
[facility]  May 4, 2022     NAME: Robert Gallo   MRN: [id number]   DATE OF PROCEDURE: 5/4/2022    PRE-PROCEDURE DIAGNOSIS: Reason:   Necrotizing Fasciitis   Right parapharyngeal abscess   Left parapharyngeal abscess   Sublingual/ submental abscess   Dental carries with left posterior molar breakdown and infection      POST-PROCEDURE DIAGNOSIS: Same    PROCEDURE: Procedure(s):  INCISION AND DRAINAGE PARAPHARYNGEAL ABCESS ON RIGHT AND LEFT  Incision and drainage of sublingual abscess   INCISION AND DRAINAGE OF NECK, BILATERAL  Extensive wound debridement of necrotic tissue from necrotizing fascitis in left neck  EXTRACTION OF TEETH #19 AND 20    STAFF SURGEON: Milena Gore MD  RESIDENT SURGEON:   1. Bret Marroquin MD  2. Dorina Gomez MD    FINDINGS:   1. Extensive dental caries with purulence encountered in the parapharyngeal spaces bilaterally with tracking along the floor of mouth into the submental space on the left  2. Necrotic tissue tracking along the investing and pretracheal layers of the deep cervical fascia with pocket of purulence superficial to the thyroid gland. Strap muscles debrided. Thyroid gland tissue healthy appearing.   3. Left cranial nerve 11 identified deep to sternocleidomastoid and preserved.    SPECIMENS:   ID Type Source Tests Collected by Time Destination   1 : 2 teeth for gross Tissue Tooth/Teeth SURGICAL PATHOLOGY EXAM Abundio Patel MD 5/4/2022  4:14 AM    A : Neck Abcess  Abscess Neck ANAEROBIC BACTERIAL CULTURE ROUTINE, GRAM STAIN, FUNGAL OR YEAST CULTURE ROUTINE, AEROBIC BACTERIAL CULTURE ROUTINE Abundio Patel MD 5/4/2022  2:48 AM    B : Left Sternal thyroid tissue.  Tissue Other ANAEROBIC BACTERIAL CULTURE ROUTINE, GRAM STAIN, FUNGAL OR YEAST CULTURE ROUTINE, AEROBIC BACTERIAL CULTURE ROUTINE Abundio Patel MD 5/4/2022  3:17 AM        ANESTHESIA: General   EBL: 10 ml  COMPLICATIONS: None  DRAINS:  None in the head and neck    INDICATIONS: This is a 26 year old male with no significant past medical history who presented at an outside hospital with multiple days of neck pain in the setting of dental caries. He was intubated for airway protection and found to have a gas-forming infection of the bilateral necks tracking into the mediastinum. He was transferred to the [facility] for definitive management.    DESCRIPTION OF THE PROCEDURE: The patient was brought to the operating room emergently and already intubated; consent was not obtained given the emergent nature of the surgery. Monitoring leads were place. The patient was prepped and draped in the usual fashion. A time-out was performed confirming patient, procedure, and laterality.    A hockey-stick cervical incision was made extending across the left neck to midline.  Subplatysmal flaps were elevated.  Subcutaneous tissue here in fascial tissues all appeared inflamed and infected.  The median raphae was identified and divided and purulence was encountered.  Cultures were obtained. Bilaterally there appeared to be necrotic involvement of the strap muscles, left greater than right.  On the left a portion of sternothyroid muscle was taken, but the deeper strap muscles were viable.  The fascial layer over the thyroid was involved and the thyroid divided at the isthmus.  This revealed healthy thyroid tissue and intact trachea.  Dissection was continued into the lateral neck on each side over the thyroid and pretracheal fascia beyond the lateral border of the thyroid gland taking care to palpate for the carotid sheath bilaterally.  Blunted dissection was continued superiorly to the superior of each thyroid pole.  There was a significant amount of purulence encountered on the left and some on the right superficial to the thyroid gland.  We then opened the fascia over the left sternocleidomastoid which had necrotic and inflamed fascia overlying it.  The  medial border of the SCM was opened until of the spinal accessory nerve was identified and preserved.  Muscle here appeared healthy and was left intact and no deeper dissection performed here. Inferiorly, dissection of the pretracheal compartment was continued to the level of the sternum, at which we then turned over exploration into the chest to our Thoracic Surgery colleagues.    Intraorally, there were multiple teeth with gross caries.  Intraorally on the left the buccal space along the mandible was open and foul, dark purulence was encountered.  The decision was made to extract teeth #19 and 21, which was done with the months #9 and dental extraction forceps.  A tonsil we see used to bluntly dissect into the left parapharyngeal space where additional purulence was encountered.  The pocket was bluntly opened with a tonsil and finger sweeps along the ramus of the mandible and into the sublingual space.  This was then repeated on the right side where the right parapharyngeal space was entered with tonsil forceps and the pocket of purulence opened with blunt dissection along the ramus of the mandible.    The oral cavity and neck were subsequently irrigated with dilute Betadine and normal saline.  The neck was packed with a roll of Betadine soaked Kerlix into the lateral compartments of the neck and superficial soft tissues on the left.    This concluded the procedure. There were no complications, hemostasis was noted, and all counts were correct. The patient was returned to the care of the Anesthesia team and transferred to the ICU in stable condition.    Dr. Margarita Jerome was present and participatory for all portions of the procedure.    Dorina Gomez MD PGY3  Otolaryngology - Head & Neck Surgery       I was present, scrubbed for the entire procedure and performed key aspects. I agree with the note.     MINDY SHAW MD

## 2022-05-06 NOTE — PROGRESS NOTES
[facility]  [facility] ID Service Progress Note    Patient: Robert Gallo  YOB: 1995, MRN: [id number]  Date of Admission:  5/4/2022    ID Assessment & Plan     ASSESSMENT: 26-year-old gentleman with history of dental caries presenting with complicated necrotizing soft tissue infection of the neck and mediastinum.  This is most consistent with complicated Glenn's angina. Now status post operative debridement with ENT and thoracic surgery on May 4.    DISCUSSION:   Stable. Repeat imaging pending. Operative cultures with Strep anginosis, Prevotella, and staph epi. Strep anginosis and Prevotella are likely driving the infection. Would narrow to Unasyn.    RECOMMENDATION:  1.  Narrow to Unasyn 3g q6h. No need for Zosyn or clindamycin, these can be discontinued.  2.  Continue trending inflammatory markers including white blood cell count and CRP  3.  Awaiting imaging results.  4.  ID will review cultures as they return and will check with [facility] and [facility] to see if additional blood cultures turn positive.   [facility] has cultures drawn at 6PM on 5/3 that are no growth as of 5/6 1300.    The patient's care was discussed with the Attending Physician, Dr. Morris, Patient's Family and Primary team.    Yg Douglas MD  [facility]  Infectious Disease Fellow  ______________________________________________________________________    Interval History:    Nursing notes reviewed, no acute events overnight. Hemodynamically stable, afebrile. Unresponsive this morning.     Review of Systems   Review of systems not obtained due to patient factors - critical condition    Physical Exam   Vital Signs: Temp: 98.7  F (37.1  C) Temp src: Axillary BP: 126/86 Pulse: 53   Resp: 21 SpO2: 100 % O2 Device: Mechanical Ventilator    Weight: 194 lbs .08 oz    Constitutional: sedated, unresponsive, critically ill  appearing  ENT: Intubated.  Generalized edema of the neck.  Large open neck incision with packing in place.  Respiratory: Coarse anterior breath sounds.  Bilateral anterior chest tubes with blood-tinged output.  Cardiovascular: Tachycardic but without irregular rhythm or murmur  GI: No scars, normal bowel sounds, soft, non-distended, non-tender, no masses palpated, no hepatosplenomegally  Skin: Various tattoos on the skin none appearing recent.  No rashes or bruising  Musculoskeletal: There is no redness, warmth, or swelling of the joints.      Data   Inflammatory Markers   Recent Labs   Lab Test 05/06/22  0344 05/05/22  0353 05/04/22  0804 05/04/22  0557   * 118* 80* 69*   .0* 440.0* 350.0* 330.0*        Hematology Studies   Recent Labs   Lab Test 05/06/22 0344 05/05/22  0353 05/04/22  0557 05/04/22  0217 05/04/22  0210   WBC 9.7 7.6 12.4*  --   --    HGB 8.0* 8.1* 10.0*  --  9.7*   MCV 93 92 92  --   --     192 241 199  --        Metabolic Studies   Recent Labs   Lab Test 05/06/22 0344 05/05/22  0353 05/04/22  0557 05/04/22  0210   * 142 137 137   POTASSIUM 3.5 4.0 4.0  4.0 3.8   CHLORIDE 112* 111* 108  --    CO2 27 27 25  --    BUN 25 23 31*  --    CR 0.82 0.82 1.12  --    GFRESTIMATED >90 >90 >90  --        Hepatic Studies    Recent Labs   Lab Test 05/04/22  0557   BILITOTAL 1.5*   ALKPHOS 93   ALBUMIN 2.0*   AST 32   ALT 26

## 2022-05-06 NOTE — PROGRESS NOTES
"ENT Progress Note    S: Sedated, although interactive. Waves and nods to questions. Additional sedation administered during exploration    O  /77 (BP Location: Right arm)   Pulse 84   Temp 99.8  F (37.7  C) (Axillary)   Resp 20   Ht 1.9 m (6' 2.8\")   Wt 88 kg (194 lb 0.1 oz)   SpO2 100%   BMI 24.38 kg/m     Tm 99.9, MAPS 60s-90s  GENERAL Intubated, sedated  HEENT Orotracheally intubated, tube in right oral cavity. Left mandibular gingiva with two teeth removed. Left parapharyngeal space with ongoing purulence during probing.   Anterior neck packing was removed and explored. Thyroid and straps with some soft tissue resection and bleeding tissue identified. Globally the wound looks a bit better today. Irrigated with betadine and saline. Repacked with betadine-soaked packing.     Chest tube output is decreasing    Assessment and Plan  Robert Gallo is a 26 year old male with no significant past medical history with necrotizing fasciitis of the neck with mediastinal involvement s/p I&D of bilateral parapharyngeal spaces, sublingual space, bilateral necks, and extraction of teeth #19 and 20 and bilateral VATS and PEG tube placement by Thoracic Surgery on 5/4/2022.    No specific plan to return to the OR today. Will follow up the CT scans to determine further cares. Appreciate the input of the other teams      Neuro:  -Pain control and sedation per SICU  HEENT:  - Repeat chest and neck scan   - Oral surgery: no further extractions currently, appreciate input  - Twice daily packing changes to the neck with Betadine soaked Kerlix to be performed by ENT. Plan to transition to dakins today  Respiratory:  - Intubated, mechanically ventilated  CV/heme:  - hemodynamically stable  FEN/GI:  - Status post PEG tube placement by Thoracic Surgery   :  - Richard in place  Endo  -hydrocortisone 50 q6 per SICU  -ISS per sicu  Heme/ID:  - ID - zosyn, recommend keeping clinda given severity of infection  -Trend CBC, CRP, and " ESR  - Follow-up OR cultures  PPX:  - subcutaneous heparin    - SCDs    Everardo Hunter MD  ENT resident

## 2022-05-06 NOTE — PROGRESS NOTES
Otolaryngology    Patient with stable wound with no progression of necrosis.  Purulence from parapharyngeal and submental collections decreased.   Tolerating wound care and irrigations.    CT of chest tomorrow. Will continue to closely monitor.
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
## 2022-05-06 NOTE — PROGRESS NOTES
Otolaryngology Attending     Reviewed CT scan which is consistent with what we are seeing clinically.   He continues to have bilateral parapharyngeal fluid collections which we know from bedside, are purulent. These collections medial to the mandible were separate from the neck and do not communicate with the neck.     The left buccal collection associated with the posterior left molars and the source of the neck extension does not have any purulence on exam and has been nicely clean since the OR, consistent with the exam in the neck. The neck tissue does not show any progression of necrosis, but continues to have tissue that bleeds but does not appear fully recovered.    Each day we have expressed purulence from the parapharyngeal spaces, primarily the left which extends submentally. We will place a penrose in the submental space externally to continue to drain the abscess here. Because of this active infection in the submental and parapharyngeal space, he is not safe for extubation. He would be at high risk for upper airway obstruction.     He may at some point necessitate a trach because of the extensive paraphagyngeal infection but with an exposed neck this is not optimal at this point. He either will have his neck improve substantially where we can close the skin and progress to trach, or his submental/paraphayrngeal infection will resolve and we will be safe to extubate him. Right now this cannot be determined.     I appreciate ID recommendations for the antibiotic management, I am however concerned that he continues with quite active purulence intraorally. All of these spaces we have direct access to, irrigate and drain, but he continues to collect purulence. Due to the location I continue to worry about anaerobic contribution to the persistent infection. We can culture the active purulence if helpful.    For the neck, the twice daily packing serves as further debridement, which is helping clean the area. A  wound vac would not achieve this at this point. It continues to be too soon to determine wether the tissue that took the insult but was not necrotic will survive-    I am happy to discuss this patient's management at any point. Please feel free to contact me directly.

## 2022-05-06 NOTE — PROGRESS NOTES
SURGICAL ICU PROGRESS NOTE  05/06/2022      PRIMARY TEAM: Thoracic   PRIMARY PHYSICIAN: Dr. Abundio Patel   REASON FOR CRITICAL CARE ADMISSION: respiratory and hemodynamic monitoring   ADMITTING PHYSICIAN: Dr. Sheldon   Date of Service (when I saw the patient): 05/06/2022    ASSESSMENT:  Robert Gallo is a 26 year old male who was admitted on 5/4/2022 with necrotizing soft tissue infection of the neck and mediastinum now POD 2 s/p BILATERAL VIDEO ASSISTED THORACOTOMY. RIGHT ANTERIOR MEDIASTINOTYOMY. PEG TUBE. INCISION AND DRAINAGE PARAPHARYNGEAL ABCESS ON RIGHT AND LEFT, remaining intubated and sedated per ENT primary, pending return to OR, TBD.    PLAN:    Changes today:  - Follow up CT  - Daily lactate  - Start Doxazosin    Neurological:  # Acute pain   - Monitor neurological status. Delirium preventions and precautions.   - Pain: fentanyl   - Sedation plan: propofol gtt, precedex gtt      Pulmonary:   # Post operative ventilatory support  - VENT: Continue full vent support. Currently 550/20/30%/8. Ventilatory bundle. HOB elevation. To remain intubated until return to OR.  - chest tube x2 continue to suction per thoracic team  - chest and neck packing to be changed by primary teams   - PST    Cardiovascular:    # Shock-distributive   - Continue to monitor   - Not requiring pressors    Gastroenterology/Nutrition:  - Bowel regimen: senna BID and miralax daily  - Diet: Osmolite 1.5; timing TBD pending surgery and extubation  - NPO for now    Fluids/Electrolytes:   # Hypokalemia   # hypocalcemia   - Continue LR 100mL/hr while NPO    Renal:  - Cr now at 0.82  - Urine output is adequate (0.7cc/kg/hr). Will continue to monitor intake and output.  - Richard was replaced due to retention, keep until OR  - Start doxazosin 2mg daily    Endocrine:  # Stress hyperglycemia   - Sliding scale for glucose management. Goal to keep BG< 180 for optimal wound healing   - HgbA1C 4.8  - Hydrocortisone 50mg q6h for stress dose  steroids    ID:  # Necrotizing soft tissue infection of the odontogenic facial region with mediastinal involvement   # Septic thrombophlebitis of the left IJ  # Leukocytosis   # Septic Shock   - zosyn, clindamycin (clinda to remain per ENT)  - WBC down to 9.7 < 7.6 < 12.4  - Follow up blood cx- NGTD  - Soft tissue cx- positive for Gram + bacilli and Gram - bacilli  - ENT with plan for take back TBD  - Repeat neck and chest CT 5/6 AM, pending  - OMFS consult- no plan for further extractions at this time  - ID consult- discontinue vanc and clinda  - CRP downtrending, 280 from 440, continue to trend  - ESR stable, 119 from 118, continue to trend  - Thiamine 200 IV BID for 4 days  - Daily lactate    Heme:     # Acute blood loss anemia   # elevated Fibrinogen   - Hgb 8.1 < 10.0  - Transfuse if hgb <7.0 or signs/symptoms of hypoperfusion. Monitor and trend.     Musculoskeletal:  - Physical and occupational therapy consult     Skin:  Routine SICU cares     General Cares/Prophylaxis:    DVT Prophylaxis: SQH, SCDs  GI Prophylaxis: PPI  Restraints: Restraints for medical healing needed: NO    Lines/ tubes/ drains:  - ETT, feldman, left PIV x2 right PIV, 2 chest tubes, PEG, LUE PICC    Disposition:  - Surgical ICU.     Patient seen, findings and plan discussed with surgical ICU staff, Dr. Hemphill.    Mica Olmedo MD, PhD  Neurosurgery PGY-1    - - - - - - - - - - - - - - - - - - - - - - - - - - - - - - - - - - - - - - - - - - - - - -   Subjective: Patient sedated and intubated. More alert. Following some commands. No acute concerns, currently off pressors. Pending CT Neck and Chest.    REVIEW OF SYSTEMS: unable to obtain     PHYSICAL EXAMINATION:  Temp:  [97.88  F (36.6  C)-99.9  F (37.7  C)] 99.8  F (37.7  C)  Pulse:  [53-84] 53  Resp:  [20] 20  BP: ()/(51-80) 116/80  MAP:  [73 mmHg-81 mmHg] 75 mmHg  Arterial Line BP: (103-110)/(59-67) 104/60  FiO2 (%):  [30 %-40 %] 30 %  SpO2:  [96 %-100 %] 100 %  General: intubated,  sedated   HEENT: wrapped in Kerlix no strike through   Neuro: intubated, sedated, PERRL   Pulm/Resp: CMV vented   CV: RRR, distal pulses palpable   Abdomen: Soft, non-distended, non-tender, no rebound tenderness or guarding, no masses  : feldman catheter in place, urine yellow and clear  Incisions/Skin: packed and clean   MSK/Extremities: No peripheral edema, moving all extremities, calves equal, extremities well perfused    LABS: Reviewed.   Arterial Blood Gases   Recent Labs   Lab 05/04/22  0918 05/04/22  0555 05/04/22 0210   PH 7.37 7.28* 7.39   PCO2 41 51* 38   PO2 175* 136* 444*   HCO3 24 24 23     Complete Blood Count   Recent Labs   Lab 05/06/22  0344 05/05/22  0353 05/04/22  0557 05/04/22 0217 05/04/22 0210   WBC 9.7 7.6 12.4*  --   --    HGB 8.0* 8.1* 10.0*  --  9.7*    192 241 199  --      Basic Metabolic Panel  Recent Labs   Lab 05/06/22  0351 05/06/22  0344 05/05/22  2334 05/05/22  1932 05/05/22  0354 05/05/22  0353 05/04/22  0806 05/04/22  0557 05/04/22  0535 05/04/22 0210   NA  --  145*  --   --   --  142  --  137  --  137   POTASSIUM  --  3.5  --   --   --  4.0  --  4.0  4.0  --  3.8   CHLORIDE  --  112*  --   --   --  111*  --  108  --   --    CO2  --  27  --   --   --  27  --  25  --   --    BUN  --  25  --   --   --  23  --  31*  --   --    CR  --  0.82  --   --   --  0.82  --  1.12  --   --    * 146* 147* 153*   < > 167*   < > 131*   < > 122*    < > = values in this interval not displayed.     Liver Function Tests  Recent Labs   Lab 05/04/22  0557 05/04/22 0217   AST 32  --    ALT 26  --    ALKPHOS 93  --    BILITOTAL 1.5*  --    ALBUMIN 2.0*  --    INR 1.41* 1.36*     Pancreatic Enzymes  No lab results found in last 7 days.  Coagulation Profile  Recent Labs   Lab 05/04/22  0557 05/04/22  0217   INR 1.41* 1.36*   PTT 39* 44*       IMAGING:  No results found for this or any previous visit (from the past 24 hour(s)).

## 2022-05-07 ENCOUNTER — APPOINTMENT (OUTPATIENT)
Dept: GENERAL RADIOLOGY | Facility: CLINIC | Age: 27
End: 2022-05-07
Payer: COMMERCIAL

## 2022-05-07 LAB
ANION GAP SERPL CALCULATED.3IONS-SCNC: 4 MMOL/L (ref 3–14)
BACTERIA ABSC ANAEROBE+AEROBE CULT: ABNORMAL
BUN SERPL-MCNC: 22 MG/DL (ref 7–30)
CALCIUM SERPL-MCNC: 8.1 MG/DL (ref 8.5–10.1)
CHLORIDE BLD-SCNC: 115 MMOL/L (ref 94–109)
CO2 SERPL-SCNC: 28 MMOL/L (ref 20–32)
CREAT SERPL-MCNC: 0.76 MG/DL (ref 0.66–1.25)
CRP SERPL-MCNC: 160 MG/L (ref 0–8)
ERYTHROCYTE [DISTWIDTH] IN BLOOD BY AUTOMATED COUNT: 14.9 % (ref 10–15)
ERYTHROCYTE [SEDIMENTATION RATE] IN BLOOD BY WESTERGREN METHOD: 113 MM/HR (ref 0–15)
GFR SERPL CREATININE-BSD FRML MDRD: >90 ML/MIN/1.73M2
GLUCOSE BLD-MCNC: 171 MG/DL (ref 70–99)
GLUCOSE BLDC GLUCOMTR-MCNC: 122 MG/DL (ref 70–99)
GLUCOSE BLDC GLUCOMTR-MCNC: 144 MG/DL (ref 70–99)
GLUCOSE BLDC GLUCOMTR-MCNC: 146 MG/DL (ref 70–99)
GLUCOSE BLDC GLUCOMTR-MCNC: 157 MG/DL (ref 70–99)
GLUCOSE BLDC GLUCOMTR-MCNC: 161 MG/DL (ref 70–99)
GLUCOSE BLDC GLUCOMTR-MCNC: 169 MG/DL (ref 70–99)
GLUCOSE BLDC GLUCOMTR-MCNC: 171 MG/DL (ref 70–99)
HCT VFR BLD AUTO: 29.3 % (ref 40–53)
HGB BLD-MCNC: 9.4 G/DL (ref 13.3–17.7)
LACTATE SERPL-SCNC: 0.9 MMOL/L (ref 0.7–2)
MAGNESIUM SERPL-MCNC: 2.1 MG/DL (ref 1.6–2.3)
MCH RBC QN AUTO: 30.1 PG (ref 26.5–33)
MCHC RBC AUTO-ENTMCNC: 32.1 G/DL (ref 31.5–36.5)
MCV RBC AUTO: 94 FL (ref 78–100)
PHOSPHATE SERPL-MCNC: 2.6 MG/DL (ref 2.5–4.5)
PLATELET # BLD AUTO: 317 10E3/UL (ref 150–450)
POTASSIUM BLD-SCNC: 3.5 MMOL/L (ref 3.4–5.3)
RBC # BLD AUTO: 3.12 10E6/UL (ref 4.4–5.9)
SODIUM SERPL-SCNC: 147 MMOL/L (ref 133–144)
WBC # BLD AUTO: 11.1 10E3/UL (ref 4–11)

## 2022-05-07 PROCEDURE — 250N000013 HC RX MED GY IP 250 OP 250 PS 637: Performed by: OTOLARYNGOLOGY

## 2022-05-07 PROCEDURE — 83735 ASSAY OF MAGNESIUM: CPT

## 2022-05-07 PROCEDURE — 84100 ASSAY OF PHOSPHORUS: CPT

## 2022-05-07 PROCEDURE — 250N000011 HC RX IP 250 OP 636: Performed by: THORACIC SURGERY (CARDIOTHORACIC VASCULAR SURGERY)

## 2022-05-07 PROCEDURE — 250N000009 HC RX 250: Performed by: STUDENT IN AN ORGANIZED HEALTH CARE EDUCATION/TRAINING PROGRAM

## 2022-05-07 PROCEDURE — 85652 RBC SED RATE AUTOMATED: CPT

## 2022-05-07 PROCEDURE — 86140 C-REACTIVE PROTEIN: CPT

## 2022-05-07 PROCEDURE — 85027 COMPLETE CBC AUTOMATED: CPT

## 2022-05-07 PROCEDURE — 71045 X-RAY EXAM CHEST 1 VIEW: CPT

## 2022-05-07 PROCEDURE — 999N000253 HC STATISTIC WEANING TRIALS

## 2022-05-07 PROCEDURE — 83605 ASSAY OF LACTIC ACID: CPT | Performed by: STUDENT IN AN ORGANIZED HEALTH CARE EDUCATION/TRAINING PROGRAM

## 2022-05-07 PROCEDURE — 250N000013 HC RX MED GY IP 250 OP 250 PS 637: Performed by: STUDENT IN AN ORGANIZED HEALTH CARE EDUCATION/TRAINING PROGRAM

## 2022-05-07 PROCEDURE — 94003 VENT MGMT INPAT SUBQ DAY: CPT

## 2022-05-07 PROCEDURE — 250N000011 HC RX IP 250 OP 636: Performed by: STUDENT IN AN ORGANIZED HEALTH CARE EDUCATION/TRAINING PROGRAM

## 2022-05-07 PROCEDURE — 250N000013 HC RX MED GY IP 250 OP 250 PS 637

## 2022-05-07 PROCEDURE — 99291 CRITICAL CARE FIRST HOUR: CPT | Mod: GC | Performed by: ANESTHESIOLOGY

## 2022-05-07 PROCEDURE — 200N000002 HC R&B ICU UMMC

## 2022-05-07 PROCEDURE — 80048 BASIC METABOLIC PNL TOTAL CA: CPT

## 2022-05-07 PROCEDURE — 71045 X-RAY EXAM CHEST 1 VIEW: CPT | Mod: 26 | Performed by: STUDENT IN AN ORGANIZED HEALTH CARE EDUCATION/TRAINING PROGRAM

## 2022-05-07 PROCEDURE — 250N000011 HC RX IP 250 OP 636

## 2022-05-07 PROCEDURE — 258N000003 HC RX IP 258 OP 636: Performed by: ANESTHESIOLOGY

## 2022-05-07 PROCEDURE — 250N000011 HC RX IP 250 OP 636: Performed by: DIETITIAN, REGISTERED

## 2022-05-07 PROCEDURE — 250N000011 HC RX IP 250 OP 636: Performed by: ANESTHESIOLOGY

## 2022-05-07 PROCEDURE — 999N000157 HC STATISTIC RCP TIME EA 10 MIN

## 2022-05-07 RX ORDER — LANOLIN ALCOHOL/MO/W.PET/CERES
6 CREAM (GRAM) TOPICAL
Status: DISCONTINUED | OUTPATIENT
Start: 2022-05-07 | End: 2022-05-26 | Stop reason: HOSPADM

## 2022-05-07 RX ORDER — POTASSIUM CHLORIDE 1.5 G/1.58G
20 POWDER, FOR SOLUTION ORAL ONCE
Status: COMPLETED | OUTPATIENT
Start: 2022-05-07 | End: 2022-05-07

## 2022-05-07 RX ADMIN — POTASSIUM & SODIUM PHOSPHATES POWDER PACK 280-160-250 MG 1 PACKET: 280-160-250 PACK at 08:11

## 2022-05-07 RX ADMIN — HYDROCORTISONE SODIUM SUCCINATE 25 MG: 100 INJECTION, POWDER, FOR SOLUTION INTRAMUSCULAR; INTRAVENOUS at 08:11

## 2022-05-07 RX ADMIN — THIAMINE HYDROCHLORIDE 200 MG: 100 INJECTION, SOLUTION INTRAMUSCULAR; INTRAVENOUS at 08:12

## 2022-05-07 RX ADMIN — Medication 40 MG: at 09:25

## 2022-05-07 RX ADMIN — AMPICILLIN SODIUM AND SULBACTAM SODIUM 3 G: 2; 1 INJECTION, POWDER, FOR SOLUTION INTRAMUSCULAR; INTRAVENOUS at 00:16

## 2022-05-07 RX ADMIN — Medication 2 PACKET: at 20:12

## 2022-05-07 RX ADMIN — SENNOSIDES AND DOCUSATE SODIUM 2 TABLET: 8.6; 5 TABLET ORAL at 08:11

## 2022-05-07 RX ADMIN — Medication 100 MCG/HR: at 06:37

## 2022-05-07 RX ADMIN — PROPOFOL 75 MCG/KG/MIN: 10 INJECTION, EMULSION INTRAVENOUS at 00:40

## 2022-05-07 RX ADMIN — DEXMEDETOMIDINE HYDROCHLORIDE 0.7 MCG/KG/HR: 400 INJECTION INTRAVENOUS at 13:13

## 2022-05-07 RX ADMIN — PROPOFOL 75 MCG/KG/MIN: 10 INJECTION, EMULSION INTRAVENOUS at 03:09

## 2022-05-07 RX ADMIN — AMPICILLIN SODIUM AND SULBACTAM SODIUM 3 G: 2; 1 INJECTION, POWDER, FOR SOLUTION INTRAMUSCULAR; INTRAVENOUS at 16:38

## 2022-05-07 RX ADMIN — Medication 50 MCG: at 08:00

## 2022-05-07 RX ADMIN — INSULIN ASPART 1 UNITS: 100 INJECTION, SOLUTION INTRAVENOUS; SUBCUTANEOUS at 23:53

## 2022-05-07 RX ADMIN — ACETAMINOPHEN 325MG 650 MG: 325 TABLET ORAL at 09:28

## 2022-05-07 RX ADMIN — HYOSCYAMINE SULFATE: 16 SOLUTION at 08:11

## 2022-05-07 RX ADMIN — DEXMEDETOMIDINE HYDROCHLORIDE 0.5 MCG/KG/HR: 400 INJECTION INTRAVENOUS at 00:15

## 2022-05-07 RX ADMIN — HYOSCYAMINE SULFATE: 16 SOLUTION at 20:11

## 2022-05-07 RX ADMIN — HEPARIN SODIUM 5000 UNITS: 5000 INJECTION, SOLUTION INTRAVENOUS; SUBCUTANEOUS at 11:39

## 2022-05-07 RX ADMIN — AMPICILLIN SODIUM AND SULBACTAM SODIUM 3 G: 2; 1 INJECTION, POWDER, FOR SOLUTION INTRAMUSCULAR; INTRAVENOUS at 11:39

## 2022-05-07 RX ADMIN — HYDROCORTISONE SODIUM SUCCINATE 25 MG: 100 INJECTION, POWDER, FOR SOLUTION INTRAMUSCULAR; INTRAVENOUS at 20:09

## 2022-05-07 RX ADMIN — POTASSIUM CHLORIDE 20 MEQ: 1.5 POWDER, FOR SOLUTION ORAL at 06:26

## 2022-05-07 RX ADMIN — AMPICILLIN SODIUM AND SULBACTAM SODIUM 3 G: 2; 1 INJECTION, POWDER, FOR SOLUTION INTRAMUSCULAR; INTRAVENOUS at 05:46

## 2022-05-07 RX ADMIN — INSULIN ASPART 1 UNITS: 100 INJECTION, SOLUTION INTRAVENOUS; SUBCUTANEOUS at 04:34

## 2022-05-07 RX ADMIN — PROPOFOL 55 MCG/KG/MIN: 10 INJECTION, EMULSION INTRAVENOUS at 05:47

## 2022-05-07 RX ADMIN — AMPICILLIN SODIUM AND SULBACTAM SODIUM 3 G: 2; 1 INJECTION, POWDER, FOR SOLUTION INTRAMUSCULAR; INTRAVENOUS at 23:49

## 2022-05-07 RX ADMIN — HEPARIN SODIUM 5000 UNITS: 5000 INJECTION, SOLUTION INTRAVENOUS; SUBCUTANEOUS at 20:09

## 2022-05-07 RX ADMIN — DOXAZOSIN 2 MG: 2 TABLET ORAL at 17:48

## 2022-05-07 RX ADMIN — INSULIN ASPART 1 UNITS: 100 INJECTION, SOLUTION INTRAVENOUS; SUBCUTANEOUS at 00:26

## 2022-05-07 RX ADMIN — INSULIN ASPART 1 UNITS: 100 INJECTION, SOLUTION INTRAVENOUS; SUBCUTANEOUS at 20:16

## 2022-05-07 RX ADMIN — HEPARIN SODIUM 5000 UNITS: 5000 INJECTION, SOLUTION INTRAVENOUS; SUBCUTANEOUS at 04:31

## 2022-05-07 RX ADMIN — THIAMINE HYDROCHLORIDE 200 MG: 100 INJECTION, SOLUTION INTRAMUSCULAR; INTRAVENOUS at 20:10

## 2022-05-07 RX ADMIN — DEXMEDETOMIDINE HYDROCHLORIDE 0.5 MCG/KG/HR: 400 INJECTION INTRAVENOUS at 08:27

## 2022-05-07 RX ADMIN — INSULIN ASPART 1 UNITS: 100 INJECTION, SOLUTION INTRAVENOUS; SUBCUTANEOUS at 16:45

## 2022-05-07 RX ADMIN — DEXMEDETOMIDINE HYDROCHLORIDE 0.7 MCG/KG/HR: 400 INJECTION INTRAVENOUS at 20:12

## 2022-05-07 RX ADMIN — Medication 50 MCG: at 10:30

## 2022-05-07 RX ADMIN — POTASSIUM & SODIUM PHOSPHATES POWDER PACK 280-160-250 MG 1 PACKET: 280-160-250 PACK at 11:39

## 2022-05-07 RX ADMIN — Medication 2 PACKET: at 08:12

## 2022-05-07 RX ADMIN — INSULIN ASPART 1 UNITS: 100 INJECTION, SOLUTION INTRAVENOUS; SUBCUTANEOUS at 11:48

## 2022-05-07 ASSESSMENT — ACTIVITIES OF DAILY LIVING (ADL)
ADLS_ACUITY_SCORE: 9
ADLS_ACUITY_SCORE: 15
ADLS_ACUITY_SCORE: 15
ADLS_ACUITY_SCORE: 9
ADLS_ACUITY_SCORE: 15
ADLS_ACUITY_SCORE: 15
ADLS_ACUITY_SCORE: 9
ADLS_ACUITY_SCORE: 15
ADLS_ACUITY_SCORE: 9
ADLS_ACUITY_SCORE: 9
ADLS_ACUITY_SCORE: 15
ADLS_ACUITY_SCORE: 9
ADLS_ACUITY_SCORE: 15
ADLS_ACUITY_SCORE: 15
ADLS_ACUITY_SCORE: 11
ADLS_ACUITY_SCORE: 9
ADLS_ACUITY_SCORE: 11
ADLS_ACUITY_SCORE: 15
ADLS_ACUITY_SCORE: 9

## 2022-05-07 NOTE — PLAN OF CARE
Major Shift Events:  Pt weaned off propofol this AM, and pt following commands. Head/neck and chest CT obtained and per ENT MD's they placed a penrose drain at bedside which required restarting propofol and bolusing fentanyl to make pt comfortable while penrose was placed. Pt also did PS on vent since this AM and was placed back on full vent support while resedated for penrose.   Plan: Continue to closely monitor pt for any changes and notify MD's. Will place back on PS as able. For vital signs and complete assessments, please see documentation flowsheets.      Goal Outcome Evaluation:  Plan of Care Reviewed With: patient, father   Overall Patient Progress: no change

## 2022-05-07 NOTE — PROGRESS NOTES
"ENT Progress Note    S: Interactive    O  /75   Pulse 78   Temp (!) 100.5  F (38.1  C) (Axillary)   Resp 25   Ht 1.9 m (6' 2.8\")   Wt 88 kg (194 lb 0.1 oz)   SpO2 99%   BMI 24.38 kg/m     Tm 100.6, MAPS 80s-100s, HR 60s  GENERAL Intubated, sedated, 20/30/550/8  HEENT left parapharyngeal space loosly approximated, minimal expressible purulence today, a small amount of the irrigation solution does enter the oral cavity through the penrose during irrigation. The irrigation in expressed. The neck looks stable from yesterdays exam, bleeding tissue throughout with manipulation.     Na 147, Cl 115, WBC 11.1 (9.7)   (280), lactic 0.9,  (199)  Tissue cx - strep anginosus  Wound cx - prevotella, s. ep    Assessment and Plan  Robert Gallo is a 26 year old male with no significant past medical history with necrotizing fasciitis of the neck with mediastinal involvement s/p I&D of bilateral parapharyngeal spaces, sublingual space, bilateral necks, and extraction of teeth #19 and 20 and bilateral VATS and PEG tube placement by Thoracic Surgery on 5/4/2022.       Neuro:  -Pain control and sedation per SICU  HEENT:  - Oral surgery: no further extractions currently, appreciate input  - Twice daily packing changes to the neck with Dakins soaked Kerlix to be performed by ENT  Respiratory:  - Intubated, mechanically ventilated  CV/heme:  - hemodynamically stable  FEN/GI:  - Status post PEG tube placement by Thoracic Surgery   :  - Richard in place  Endo  -ISS per sicu  Heme/ID:  - ID - unasyn  -Trend CBC, CRP, and ESR  - Follow-up OR cultures, new cultures obtained 5/6/2022  PPX:  - subcutaneous heparin    - SCDs    Everardo Hunter. MD  ENT resident  "

## 2022-05-07 NOTE — PROGRESS NOTES
"STAFF NOTE:  Nursing was able to wean him off propofol this morning  No concerns during dressing change by ENT this morning    Exam easily arousable  CAM-ICU negative  Both chest tubes to suction  Good cuff leak and strong cough  New drain submentally; draining some purulent stuff  Large neck incision is packed; the edges looks fresh  mediastinal wound packed  Richard with good, clear urine  PICC site looks fine  Multiple tattoos that appear to be done at home    Labs sodium has been climbing up to 147  Rest of lytes are fine  CRP is downtrending  All counts on CBC are up, consisent with concentration effect    This is a 26M, presumed otherwise healthy, who presents with bilateral dental / pharyngeal abscesses and concern for a necrotizing infection extending through the paratracheal gutters deep to strap muscles and into the mediastinum.  He has undergone thorough debridement, and based on a CT today and bedside exam, there is no further concern for an active necrotizing process.    As I noted yesterday, my understanding is that this point he has two separate regions of infection that do not communicate with each other: 1.) a left buccal / neck fluid collection which is non-purulent, non-necrotic, but with a slowly healing tissue bed; and 2.) bilateral pharyngeal fluid collections, with the left side extending submentally.  These pharyngeal / submental fluid collections remain purulent on exam, and the concern is that his existing ETT serves as a \"stent\" of sorts, and that he is at risk for upper airway obstruction if the ETT were to be removed while the submental/parapharyngeal infection remains active.  He therefore will remain intubated indefintely - until either the neck wound heals enohgh that the skin can be closed over it and he can receive a tracheostomy to maintain a \"stenting\" of his trachea in the face of the parapharyngeal / submental fluid collections, or until the submental/parapharyngeal infection " resolves enough that there is no longer concern for upper airway obstruction after extubation.    Today I believe we might be able to get him to the equivalent of trach dome - take him off the vent totally - to maybe walk him a little or at least get him up to a chair.  Should not need further diuresis, so we can remove the feldman.  We can increease his free water.     METABOLIC ENCEPHALOPATHY / DELIRIUM:  -due to infection, sedation; monitoring with clinical exam  -melatonin for sleep; precedex for sedation; minimize propofol     ACUTE HYPOXIC RESPIRATORY FAILURE  -resolved.  Now intubated for airway protection due to the risk of upper airway obstruction with his parapharyngeal fluid collections.  ETT will remain until either his neck wound has healed well enough to close skin (and he can thus get a trach), or the parapharyngeal fluid collections have resolved enough to safely extubate without concern for obstruction  -indefinite pressure support is ok    PHARYNGEAL / SUBMENTAL / NECK ABSCESS:  -Unasyn   -wet-to-dry bid dressing  -drain in left submental space by ENT   -HIV negative  -trend CRP / ESR    SEVERE SEPSIS:  -resolved; off pressors   -antibiotics as above  -thiamine 200mg IV q12h;  wean hydrocortisone over 3d      ACUTE BLOOD LOSS ANEMIA:  -no current indication for transfusion    SEVERE PROTEIN-CALORIE MALNUTRITION:  -aggressively replacing Phos+ & Mg++  -gastric feeds at goal    HYPERNATREMIA:  -increase free water    MISC:  -Code status is full code  -father updated by me at bedside; mother Eloina updated by resident by phone  -[facility]; PPI for intubation  -lines: PICC   -will again attempt removing feldman.  On doxazosin for prior urinary retention  -anticipate discharge to acute rehab in >>1 week    Billing statement: 31min of critical care time; spent in an initial review of imaging, labs, physical exam, and discussion of the patient with my own team and the extended care team including the primary service.  Based on this patient's presentation / recent intervention and my bedside assessment, I felt there was or is a reasonably high probability of imminent or life-threatening deterioration today or tonight for septic  reasons.   My overall critical care time, as described in detail above, includes such things as coordination of care, arrhythmia and hemodynamics management with infusions of medicines, respiratory management, fluid therapy including fluid boluses, and pain and sedation therapy. This time excludes time I spent personally performing or supervising procedures for this patient.    OPAL Hemphill MD  Clinical   Anesthesia / Critical Care  *[phone]

## 2022-05-07 NOTE — PROGRESS NOTES
SURGICAL ICU PROGRESS NOTE  05/07/2022      PRIMARY TEAM: ENT  PRIMARY PHYSICIAN: Dr. Milena Gore MD  REASON FOR CRITICAL CARE ADMISSION: respiratory and hemodynamic monitoring   ADMITTING PHYSICIAN: Dr. Sheldon   Date of Service (when I saw the patient): 05/07/2022    ASSESSMENT:  Robert Gallo is a 26 year old male who was admitted on 5/4/2022 with necrotizing soft tissue infection of the neck and mediastinum now POD 3 s/p BILATERAL VIDEO ASSISTED THORACOTOMY. RIGHT ANTERIOR MEDIASTINOTYOMY. PEG TUBE. INCISION AND DRAINAGE PARAPHARYNGEAL ABCESS ON RIGHT AND LEFT, remaining intubated and sedated per ENT primary, pending resolution of infection and closure of neck wound.    PLAN:    Changes today:  - Discontinue feldman  - FWF to 60q4  - Bilateral CT to water seal per thoracic  - PST    Neurological:  # Acute pain   - Monitor neurological status. Delirium preventions and precautions.   - Pain: fentanyl   - Sedation plan: propofol gtt, precedex gtt, wean as tolerated  Pulmonary:   # Post operative ventilatory support  # Aspiration vs atelectasis  - VENT: Continue PST daily and overnight with PRN vent support. Currently 550/20/30%/8. Ventilatory bundle. HOB elevation. To remain intubated until cleared by ENT  - chest tube x2 to water seal per thoracic team  - chest and neck packing to be changed by primary teams   - Continue to monitor     Cardiovascular:    # Shock-distributive   - Continue to monitor   - Not requiring pressors    Gastroenterology/Nutrition:  - Bowel regimen: senna BID and miralax daily  - Diet: Osmolite 1.5 at goal 65mL/hr    Fluids/Electrolytes:   # Hypokalemia, resolved  # hypocalcemia   # Hypernatremia  - Na trending up, 147 today  - Increase FWF 60 q4  - Continue to monitor BMP    Renal:  - Cr now at 0.76  - Urine output is adequate (1.2cc/kg/hr). Will continue to monitor intake and output.  - On doxazosin 2mg daily  - discontinue feldman today    Endocrine:  # Stress hyperglycemia   -  Sliding scale for glucose management. Goal to keep BG< 180 for optimal wound healing   - HgbA1C 4.8  - Weaning stress dose steroids, Hydrocortisone 25mg BID today (12.5 BID 5/8, then off)    ID:  # Necrotizing soft tissue infection of the odontogenic facial region with mediastinal involvement   # Septic thrombophlebitis of the left IJ  # Leukocytosis   # Septic Shock   CT Neck 5/6 w/ No definite residual rim-enhancing collection however, the edematous collections could represent developing phlegmon.   - Zosyn, clindamycin discontinued per ID 5/6  - Unasyn 3g q6h  - WBC down to 11.1 < 9.7 < 7.6, tmax 100.5  - Blood cx- NGTD  - Soft tissue cx- positive for Prevotella and Streptococcus anginosus   - ENT with plan for take back TBD  - OMFS consult- no plan for further extractions at this time  - CRP downtrending, 160 from 280, continue to trend  - ESR downtrending, 113 from 119, continue to trend  - Thiamine 200 IV BID for 4 days  - Daily lactate, 0.9 today    Heme:     # Acute blood loss anemia   # elevated Fibrinogen   - Hgb 9.4, stable  - Transfuse if hgb <7.0 or signs/symptoms of hypoperfusion. Monitor and trend.     Musculoskeletal:  - Physical and occupational therapy consult     Skin:  Routine SICU cares     General Cares/Prophylaxis:    DVT Prophylaxis: SQH, SCDs  GI Prophylaxis: PPI  Restraints: Restraints for medical healing needed: NO    Lines/ tubes/ drains:  - ETT, feldman, left PIV x2 right PIV, 2 chest tubes, PEG, LUE PICC    Disposition:  - Surgical ICU.     Patient seen, findings and plan discussed with surgical ICU staff, Dr. Hempihll.    Mica Olmedo MD, PhD  Neurosurgery PGY-1    - - - - - - - - - - - - - - - - - - - - - - - - - - - - - - - - - - - - - - - - - - - - - -   Subjective: Patient sedated and intubated. Much more alert. Following commands. No acute concerns.    REVIEW OF SYSTEMS: unable to obtain     PHYSICAL EXAMINATION:  Temp:  [98.2  F (36.8  C)-100.6  F (38.1  C)] 100.5  F (38.1   C)  Pulse:  [48-75] 61  Resp:  [20-26] 21  BP: (116-133)/(73-92) 128/78  FiO2 (%):  [30 %] 30 %  SpO2:  [98 %-100 %] 99 %  General: intubated, sedated   HEENT: wrapped in Kerlix no strike through   Neuro: intubated, sedated, more alert, following commands, PERRL, moves all extremities spontaneously   Pulm/Resp: CMV vented  CV: RRR, distal pulses palpable   Abdomen: Soft, non-distended, non-tender, no rebound tenderness or guarding, no masses  : feldman catheter in place, urine yellow and clear  Incisions/Skin: packed and clean   MSK/Extremities: No peripheral edema, moving all extremities, calves equal, extremities well perfused    LABS: Reviewed.   Arterial Blood Gases   Recent Labs   Lab 05/04/22  0918 05/04/22  0555 05/04/22  0210   PH 7.37 7.28* 7.39   PCO2 41 51* 38   PO2 175* 136* 444*   HCO3 24 24 23     Complete Blood Count   Recent Labs   Lab 05/07/22  0454 05/06/22  0344 05/05/22  0353 05/04/22  0557   WBC 11.1* 9.7 7.6 12.4*   HGB 9.4* 8.0* 8.1* 10.0*    210 192 241     Basic Metabolic Panel  Recent Labs   Lab 05/07/22  0454 05/07/22  0433 05/07/22  0025 05/06/22  2108 05/06/22  0351 05/06/22  0344 05/05/22  0354 05/05/22  0353 05/04/22  0806 05/04/22  0557   *  --   --   --   --  145*  --  142  --  137   POTASSIUM 3.5  --   --   --   --  3.5  --  4.0  --  4.0  4.0   CHLORIDE 115*  --   --   --   --  112*  --  111*  --  108   CO2 28  --   --   --   --  27  --  27  --  25   BUN 22  --   --   --   --  25  --  23  --  31*   CR 0.76  --   --   --   --  0.82  --  0.82  --  1.12   * 169* 161* 126*   < > 146*   < > 167*   < > 131*    < > = values in this interval not displayed.     Liver Function Tests  Recent Labs   Lab 05/04/22 0557 05/04/22 0217   AST 32  --    ALT 26  --    ALKPHOS 93  --    BILITOTAL 1.5*  --    ALBUMIN 2.0*  --    INR 1.41* 1.36*     Pancreatic Enzymes  No lab results found in last 7 days.  Coagulation Profile  Recent Labs   Lab 05/04/22 0557 05/04/22 0217   INR  1.41* 1.36*   PTT 39* 44*       IMAGING:  Recent Results (from the past 24 hour(s))   CT Chest w Contrast    Narrative    EXAMINATION: Chest CT  5/6/2022 1:24 PM    CLINICAL HISTORY: Necrotizing fasciitis of the neck and mediastinum  status post emergent debridement 5/4, evaluate progression.    COMPARISON: Chest radiograph 5/4/2022, same-day CT neck.    TECHNIQUE: CT imaging obtained through the chest with contrast. Axial,  coronal, and sagittal reconstructions and axial MIP reformatted images  are reviewed.     CONTRAST: Isovue 370 100ml    FINDINGS:  Lines/Tubes: Left upper extremity PICC tip terminates in the right  atrium. Endotracheal tube tip terminates in proximal thoracic trachea.  Bilateral chest tubes terminate in the posterior pleural space.    Soft tissues: The partially imaged lower neck demonstrates open soft  tissue defect in the subcutaneous tissues of the neck anteriorly which  extends inferiorly to the level of the superior clavicles. An  additional smaller open defect in the soft tissues of the anterior  right chest wall at the level of the second intercostal space.     Mediastinum: Small amount of intermediate density fluid, presumably  blood products, present in the anterior mediastinum with scattered  foci of air. The heart is normal in size. No significant pericardial  effusion. The ascending aorta and main pulmonary artery are normal in  caliber. No hilar, mediastinal, or axillary lymphadenopathy. The  esophagus is unremarkable.      Lungs: Layering debris within the trachea. Trace pleural effusions at  the lung bases bilaterally. Small intermediate density pleural fluid,  presumably blood product, in the anterior right middle lobe pleural  space with scattered foci of air. Dependent opacities at the lung  bases.    Bones and soft tissues: No suspicious bone findings.     Upper Abdomen: Limited evaluation of the upper abdomen is  unremarkable.      Impression    IMPRESSION:   1. Open soft  "tissue defect of the anterior neck extends inferiorly to  the superior aspect of the clavicles. Additional open soft tissue  defect is present in the anterior right chest wall at the second  intercostal space.  2. Small amount of presumed blood product is present in the anterior  mediastinum in the anterior right pleural space with associated  scattered foci of air, likely iatrogenic in setting of recent  debridement.  3. Dependent consolidative opacities of the lung bases, likely  atelectasis and/or aspiration.    I have personally reviewed the examination and initial interpretation  and I agree with the findings.    SALAS GLASGOW MD         SYSTEM ID:  [id number]   CT Soft Tissue Neck w Contrast    Narrative    CT HEAD W CONTRAST, CT SOFT TISSUE NECK W CONTRAST 5/6/2022 12:21 PM    HISTORY:  Brain abscess    ADDITIONAL HISTORY FROM EMR: \"26 year old male who was admitted on  5/4/2022 with necrotizing soft tissue infection of the neck and  mediastinum now POD?2?s/p BILATERAL VIDEO ASSISTED THORACOTOMY. RIGHT  ANTERIOR MEDIASTINOTYOMY. PEG TUBE. INCISION AND DRAINAGE  PARAPHARYNGEAL ABCESS ON RIGHT AND LEFT\"      COMPARISON:  Chest radiograph 5/4/2022     TECHNIQUE: Following intravenous administration of nonionic iodinated  contrast medium, thin section helical CT images were obtained from the  skull vertex down to the level of the aortic arch.  Axial, coronal and  sagittal reformations were performed with 2-3 mm slice thickness  reconstruction. Images were reviewed in soft tissue, lung and bone  windows.    CONTRAST: Isovue 370 100ml (accession [id number]), Isovue 370 100ml;  (accession [id number])    FINDINGS:   Head: There is no evidence of intracranial hemorrhage, mass effect or  midline shift. Gray/white matter differentiation in both cerebral  hemispheres is preserved.  The ventricles are proportionate to the  cerebral sulci. Postcontrast images demonstrate no areas of abnormal  intraaxial or extraaxial contrast " enhancement.     The bony calvaria and the bones of the skull base appear normal.  Minimal paranasal sinus mucosal thickening. Mastoid air cells are  clear. Orbits are unremarkable.     Neck: Endotracheal intubation with tip in the upper thoracic trachea,  right apical chest tube, left upper extremity approach PICC.  Postsurgical changes of right anterior median sternotomy, bilateral  incision and drainage of cervical parapharyngeal space with large open  wound extending over the anterior cervical soft tissues superiorly at  midline to the level of the thyroid cartilage. Wound continues  superiorly to the left along the medial left sternocleidomastoid.  Packing material is in place.    Diffuse interstitial and soft tissue edema of the deep spaces of the  ventral neck including the sublingual space, left greater than right   spaces, bilateral parapharyngeal spaces. No definite  abscess in the danger space. A few scattered foci of subcutaneous  emphysema in the deep spaces of the neck, including in the sublingual  and bilateral  spaces. No definite rim-enhancing collection  within the deep spaces of the neck. The edematous collections could be  developing phlegmons.    Diffuse mucosal edema throughout the upper aerodigestive tract without  focal mucosal lesion. The major salivary glands appear edematous. The  thyroid gland appears normal.    Scattered prominent to enlarged bilateral cervical lymph nodes without  evidence of lymph node necrosis or cystic change.  The major arterial  vascular structures in the neck appear unremarkable. Abrupt  termination of contrast filling in the mid internal jugular vein near  the left carotid bifurcation compatible with thrombosis. This finding  can be seen in the setting of Lemierre syndrome. Right internal  jugular vein is patent.    Evaluation of the osseous structures demonstrate no worrisome lytic or  erosive change. Diffuse dental caries with sequela of  "recent left  mandibular dental extractions. Left maxillary periapical dental  lucencies. Mild hypoplasia of the left mandibular condyle. Mild  paranasal sinus mucosal thickening. The mastoid air cells are clear.     Lung apices demonstrate no focal consolidation. Retrosternal  mediastinal edema and subcutaneous emphysema.      Impression    IMPRESSION:  1.  CT of the head demonstrates no acute cranial abnormality. No  abnormal intracranial contrast enhancing lesions.   2.  CT of the neck shows postsurgical changes of anterior median  sternotomy and bilateral incision and drainage of cervical  parapharyngeal spaces for necrotizing infection.  Large open wound  extends over the anterior cervical soft tissues with diffuse  heterogeneous edema of multiple neck spaces as described in great  detail above sparing the danger space. No definite residual  rim-enhancing collection however, the edematous collections could  represent developing phlegmon.   3.  No definite flow seen in the left mid and inferior internal  jugular vein, compatible with thrombus. 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  4.  Diffuse dental caries, left maxillary periapical dental disease  and recent left mandibular dental extractions.    I have personally reviewed the examination and initial interpretation  and I agree with the findings.    SAULO FAULKNER MD         SYSTEM ID:  [id number]   CT Head w Contrast    Narrative    CT HEAD W CONTRAST, CT SOFT TISSUE NECK W CONTRAST 5/6/2022 12:21 PM    HISTORY:  Brain abscess    ADDITIONAL HISTORY FROM EMR: \"26 year old male who was admitted on  5/4/2022 with necrotizing soft tissue infection of the neck and  mediastinum now POD?2?s/p BILATERAL VIDEO ASSISTED THORACOTOMY. RIGHT  ANTERIOR MEDIASTINOTYOMY. PEG TUBE. INCISION AND DRAINAGE  PARAPHARYNGEAL ABCESS ON RIGHT AND LEFT\"      COMPARISON:  Chest radiograph 5/4/2022     TECHNIQUE: Following intravenous administration of nonionic " iodinated  contrast medium, thin section helical CT images were obtained from the  skull vertex down to the level of the aortic arch.  Axial, coronal and  sagittal reformations were performed with 2-3 mm slice thickness  reconstruction. Images were reviewed in soft tissue, lung and bone  windows.    CONTRAST: Isovue 370 100ml (accession [id number]), Isovue 370 100ml;  (accession [id number])    FINDINGS:   Head: There is no evidence of intracranial hemorrhage, mass effect or  midline shift. Gray/white matter differentiation in both cerebral  hemispheres is preserved.  The ventricles are proportionate to the  cerebral sulci. Postcontrast images demonstrate no areas of abnormal  intraaxial or extraaxial contrast enhancement.     The bony calvaria and the bones of the skull base appear normal.  Minimal paranasal sinus mucosal thickening. Mastoid air cells are  clear. Orbits are unremarkable.     Neck: Endotracheal intubation with tip in the upper thoracic trachea,  right apical chest tube, left upper extremity approach PICC.  Postsurgical changes of right anterior median sternotomy, bilateral  incision and drainage of cervical parapharyngeal space with large open  wound extending over the anterior cervical soft tissues superiorly at  midline to the level of the thyroid cartilage. Wound continues  superiorly to the left along the medial left sternocleidomastoid.  Packing material is in place.    Diffuse interstitial and soft tissue edema of the deep spaces of the  ventral neck including the sublingual space, left greater than right   spaces, bilateral parapharyngeal spaces. No definite  abscess in the danger space. A few scattered foci of subcutaneous  emphysema in the deep spaces of the neck, including in the sublingual  and bilateral  spaces. No definite rim-enhancing collection  within the deep spaces of the neck. The edematous collections could be  developing phlegmons.    Diffuse mucosal edema  throughout the upper aerodigestive tract without  focal mucosal lesion. The major salivary glands appear edematous. The  thyroid gland appears normal.    Scattered prominent to enlarged bilateral cervical lymph nodes without  evidence of lymph node necrosis or cystic change.  The major arterial  vascular structures in the neck appear unremarkable. Abrupt  termination of contrast filling in the mid internal jugular vein near  the left carotid bifurcation compatible with thrombosis. This finding  can be seen in the setting of Lemierre syndrome. Right internal  jugular vein is patent.    Evaluation of the osseous structures demonstrate no worrisome lytic or  erosive change. Diffuse dental caries with sequela of recent left  mandibular dental extractions. Left maxillary periapical dental  lucencies. Mild hypoplasia of the left mandibular condyle. Mild  paranasal sinus mucosal thickening. The mastoid air cells are clear.     Lung apices demonstrate no focal consolidation. Retrosternal  mediastinal edema and subcutaneous emphysema.      Impression    IMPRESSION:  1.  CT of the head demonstrates no acute cranial abnormality. No  abnormal intracranial contrast enhancing lesions.   2.  CT of the neck shows postsurgical changes of anterior median  sternotomy and bilateral incision and drainage of cervical  parapharyngeal spaces for necrotizing infection.  Large open wound  extends over the anterior cervical soft tissues with diffuse  heterogeneous edema of multiple neck spaces as described in great  detail above sparing the danger space. No definite residual  rim-enhancing collection however, the edematous collections could  represent developing phlegmon.   3.  No definite flow seen in the left mid and inferior internal  jugular vein, compatible with thrombus. 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  4.  Diffuse dental caries, left maxillary periapical dental disease  and recent left mandibular dental  extractions.    I have personally reviewed the examination and initial interpretation  and I agree with the findings.    SAULO FAULKNER MD         SYSTEM ID:  [id number]

## 2022-05-07 NOTE — PLAN OF CARE
No acute events overnight. Sedated on propofol and fentanyl. Opens eyes to voice. Nods yes/no. Follows commands. VSS. Tmax 100.5F. CMV 30%/550/20/8; Propofol being weaned so patient can transition back to PS settings on vent, CT to -20cm with minimal output. Penrose drain with serosanguinous output. No BM. Richard with adequate output. K & Phos replaced, recheck tomorrow AM.

## 2022-05-07 NOTE — PROGRESS NOTES
STAFF ADDENDUM:  I saw and evaluated Mr. Gallo and agree with the resident s findings and plan of care   Scans reviewed  Chest tube to waterseal today      Radha Ortiz MD

## 2022-05-07 NOTE — PLAN OF CARE
Major Shift Events:  Propofol weaned off, Precedex turned up for mild anxiety and noncompliance with ETT. Pressure support X2 tolerating only 30 minutes before breathing in mid 30s and nodding yes to difficulty breathing. X1 with movement to and from chair, X1 at rest in bed with no other changes. Chest tubes to water seal in AM, follow up XRAY complete.  Richard pulled, voided post pull  Loose stool X4, assist of 1 to commode.  Neck dressing changed X2 by ENT. R chest tube dressing changed X2 by nurse for leakage, thoracic aware.    Plan: Continue dressing changes, and antibiotics. Remain intubated for airway stenting until infection is more controlled.  For vital signs and complete assessments, please see documentation flowsheets.

## 2022-05-08 ENCOUNTER — APPOINTMENT (OUTPATIENT)
Dept: GENERAL RADIOLOGY | Facility: CLINIC | Age: 27
End: 2022-05-08
Attending: PHYSICIAN ASSISTANT
Payer: COMMERCIAL

## 2022-05-08 ENCOUNTER — APPOINTMENT (OUTPATIENT)
Dept: ULTRASOUND IMAGING | Facility: CLINIC | Age: 27
End: 2022-05-08
Payer: COMMERCIAL

## 2022-05-08 ENCOUNTER — APPOINTMENT (OUTPATIENT)
Dept: GENERAL RADIOLOGY | Facility: CLINIC | Age: 27
End: 2022-05-08
Payer: COMMERCIAL

## 2022-05-08 LAB
ABO/RH(D): NORMAL
ALBUMIN SERPL-MCNC: 1.8 G/DL (ref 3.4–5)
ALBUMIN UR-MCNC: NEGATIVE MG/DL
ALP SERPL-CCNC: 185 U/L (ref 40–150)
ALT SERPL W P-5'-P-CCNC: 126 U/L (ref 0–70)
ANION GAP SERPL CALCULATED.3IONS-SCNC: 5 MMOL/L (ref 3–14)
ANTIBODY SCREEN: NEGATIVE
APPEARANCE UR: CLEAR
AST SERPL W P-5'-P-CCNC: 120 U/L (ref 0–45)
BACTERIA SPT CULT: ABNORMAL
BILIRUB DIRECT SERPL-MCNC: 0.3 MG/DL (ref 0–0.2)
BILIRUB SERPL-MCNC: 0.6 MG/DL (ref 0.2–1.3)
BILIRUB UR QL STRIP: NEGATIVE
BUN SERPL-MCNC: 16 MG/DL (ref 7–30)
CALCIUM SERPL-MCNC: 7.8 MG/DL (ref 8.5–10.1)
CHLORIDE BLD-SCNC: 114 MMOL/L (ref 94–109)
CO2 SERPL-SCNC: 26 MMOL/L (ref 20–32)
COLOR UR AUTO: ABNORMAL
CREAT SERPL-MCNC: 0.66 MG/DL (ref 0.66–1.25)
CRP SERPL-MCNC: 140 MG/L (ref 0–8)
ERYTHROCYTE [DISTWIDTH] IN BLOOD BY AUTOMATED COUNT: 14.6 % (ref 10–15)
ERYTHROCYTE [SEDIMENTATION RATE] IN BLOOD BY WESTERGREN METHOD: 101 MM/HR (ref 0–15)
GFR SERPL CREATININE-BSD FRML MDRD: >90 ML/MIN/1.73M2
GLUCOSE BLD-MCNC: 152 MG/DL (ref 70–99)
GLUCOSE BLDC GLUCOMTR-MCNC: 140 MG/DL (ref 70–99)
GLUCOSE BLDC GLUCOMTR-MCNC: 152 MG/DL (ref 70–99)
GLUCOSE BLDC GLUCOMTR-MCNC: 154 MG/DL (ref 70–99)
GLUCOSE BLDC GLUCOMTR-MCNC: 172 MG/DL (ref 70–99)
GLUCOSE BLDC GLUCOMTR-MCNC: 195 MG/DL (ref 70–99)
GLUCOSE UR STRIP-MCNC: 50 MG/DL
GRAM STAIN RESULT: ABNORMAL
GRAM STAIN RESULT: ABNORMAL
HCT VFR BLD AUTO: 30.4 % (ref 40–53)
HGB BLD-MCNC: 9.6 G/DL (ref 13.3–17.7)
HGB UR QL STRIP: NEGATIVE
KETONES UR STRIP-MCNC: NEGATIVE MG/DL
LACTATE SERPL-SCNC: 1.1 MMOL/L (ref 0.7–2)
LEUKOCYTE ESTERASE UR QL STRIP: NEGATIVE
LIPASE SERPL-CCNC: 135 U/L (ref 73–393)
MAGNESIUM SERPL-MCNC: 2 MG/DL (ref 1.6–2.3)
MCH RBC QN AUTO: 29.5 PG (ref 26.5–33)
MCHC RBC AUTO-ENTMCNC: 31.6 G/DL (ref 31.5–36.5)
MCV RBC AUTO: 94 FL (ref 78–100)
MUCOUS THREADS #/AREA URNS LPF: PRESENT /LPF
NITRATE UR QL: NEGATIVE
PH UR STRIP: 5 [PH] (ref 5–7)
PHOSPHATE SERPL-MCNC: 3 MG/DL (ref 2.5–4.5)
PLATELET # BLD AUTO: 340 10E3/UL (ref 150–450)
POTASSIUM BLD-SCNC: 3.3 MMOL/L (ref 3.4–5.3)
POTASSIUM BLD-SCNC: 3.4 MMOL/L (ref 3.4–5.3)
POTASSIUM BLD-SCNC: 3.6 MMOL/L (ref 3.4–5.3)
PROT SERPL-MCNC: 6.3 G/DL (ref 6.8–8.8)
RBC # BLD AUTO: 3.25 10E6/UL (ref 4.4–5.9)
RBC URINE: 1 /HPF
SODIUM SERPL-SCNC: 145 MMOL/L (ref 133–144)
SP GR UR STRIP: 1.01 (ref 1–1.03)
SPECIMEN EXPIRATION DATE: NORMAL
SQUAMOUS EPITHELIAL: <1 /HPF
UROBILINOGEN UR STRIP-MCNC: NORMAL MG/DL
WBC # BLD AUTO: 9.7 10E3/UL (ref 4–11)
WBC URINE: 1 /HPF

## 2022-05-08 PROCEDURE — 93970 EXTREMITY STUDY: CPT

## 2022-05-08 PROCEDURE — 71045 X-RAY EXAM CHEST 1 VIEW: CPT

## 2022-05-08 PROCEDURE — 250N000011 HC RX IP 250 OP 636: Performed by: STUDENT IN AN ORGANIZED HEALTH CARE EDUCATION/TRAINING PROGRAM

## 2022-05-08 PROCEDURE — 250N000013 HC RX MED GY IP 250 OP 250 PS 637

## 2022-05-08 PROCEDURE — 200N000002 HC R&B ICU UMMC

## 2022-05-08 PROCEDURE — 71045 X-RAY EXAM CHEST 1 VIEW: CPT | Mod: 26 | Performed by: RADIOLOGY

## 2022-05-08 PROCEDURE — 250N000013 HC RX MED GY IP 250 OP 250 PS 637: Performed by: OTOLARYNGOLOGY

## 2022-05-08 PROCEDURE — 84100 ASSAY OF PHOSPHORUS: CPT

## 2022-05-08 PROCEDURE — 83605 ASSAY OF LACTIC ACID: CPT | Performed by: STUDENT IN AN ORGANIZED HEALTH CARE EDUCATION/TRAINING PROGRAM

## 2022-05-08 PROCEDURE — 83690 ASSAY OF LIPASE: CPT | Performed by: STUDENT IN AN ORGANIZED HEALTH CARE EDUCATION/TRAINING PROGRAM

## 2022-05-08 PROCEDURE — 83735 ASSAY OF MAGNESIUM: CPT

## 2022-05-08 PROCEDURE — 999N000157 HC STATISTIC RCP TIME EA 10 MIN

## 2022-05-08 PROCEDURE — 250N000011 HC RX IP 250 OP 636

## 2022-05-08 PROCEDURE — 84132 ASSAY OF SERUM POTASSIUM: CPT | Performed by: OTOLARYNGOLOGY

## 2022-05-08 PROCEDURE — 85652 RBC SED RATE AUTOMATED: CPT

## 2022-05-08 PROCEDURE — 94003 VENT MGMT INPAT SUBQ DAY: CPT

## 2022-05-08 PROCEDURE — 82310 ASSAY OF CALCIUM: CPT

## 2022-05-08 PROCEDURE — 250N000013 HC RX MED GY IP 250 OP 250 PS 637: Performed by: STUDENT IN AN ORGANIZED HEALTH CARE EDUCATION/TRAINING PROGRAM

## 2022-05-08 PROCEDURE — 999N000065 XR CHEST PORT 1 VIEW

## 2022-05-08 PROCEDURE — 71045 X-RAY EXAM CHEST 1 VIEW: CPT | Mod: 76

## 2022-05-08 PROCEDURE — 250N000009 HC RX 250: Performed by: STUDENT IN AN ORGANIZED HEALTH CARE EDUCATION/TRAINING PROGRAM

## 2022-05-08 PROCEDURE — 36415 COLL VENOUS BLD VENIPUNCTURE: CPT | Performed by: STUDENT IN AN ORGANIZED HEALTH CARE EDUCATION/TRAINING PROGRAM

## 2022-05-08 PROCEDURE — 87040 BLOOD CULTURE FOR BACTERIA: CPT | Performed by: STUDENT IN AN ORGANIZED HEALTH CARE EDUCATION/TRAINING PROGRAM

## 2022-05-08 PROCEDURE — 250N000011 HC RX IP 250 OP 636: Performed by: PHYSICIAN ASSISTANT

## 2022-05-08 PROCEDURE — 99291 CRITICAL CARE FIRST HOUR: CPT | Mod: GC | Performed by: ANESTHESIOLOGY

## 2022-05-08 PROCEDURE — 86901 BLOOD TYPING SEROLOGIC RH(D): CPT | Performed by: PHYSICIAN ASSISTANT

## 2022-05-08 PROCEDURE — 81003 URINALYSIS AUTO W/O SCOPE: CPT | Performed by: ANESTHESIOLOGY

## 2022-05-08 PROCEDURE — 86850 RBC ANTIBODY SCREEN: CPT | Performed by: PHYSICIAN ASSISTANT

## 2022-05-08 PROCEDURE — 71045 X-RAY EXAM CHEST 1 VIEW: CPT | Mod: 26 | Performed by: STUDENT IN AN ORGANIZED HEALTH CARE EDUCATION/TRAINING PROGRAM

## 2022-05-08 PROCEDURE — 250N000011 HC RX IP 250 OP 636: Performed by: DIETITIAN, REGISTERED

## 2022-05-08 PROCEDURE — 82248 BILIRUBIN DIRECT: CPT | Performed by: STUDENT IN AN ORGANIZED HEALTH CARE EDUCATION/TRAINING PROGRAM

## 2022-05-08 PROCEDURE — 86140 C-REACTIVE PROTEIN: CPT

## 2022-05-08 PROCEDURE — 93970 EXTREMITY STUDY: CPT | Mod: 26 | Performed by: RADIOLOGY

## 2022-05-08 PROCEDURE — 36415 COLL VENOUS BLD VENIPUNCTURE: CPT | Performed by: OTOLARYNGOLOGY

## 2022-05-08 PROCEDURE — 85027 COMPLETE CBC AUTOMATED: CPT

## 2022-05-08 RX ORDER — POTASSIUM CHLORIDE 1.5 G/1.58G
40 POWDER, FOR SOLUTION ORAL ONCE
Status: COMPLETED | OUTPATIENT
Start: 2022-05-08 | End: 2022-05-08

## 2022-05-08 RX ORDER — FUROSEMIDE 10 MG/ML
20 INJECTION INTRAMUSCULAR; INTRAVENOUS ONCE
Status: COMPLETED | OUTPATIENT
Start: 2022-05-08 | End: 2022-05-08

## 2022-05-08 RX ORDER — FENTANYL CITRATE 50 UG/ML
50 INJECTION, SOLUTION INTRAMUSCULAR; INTRAVENOUS 2 TIMES DAILY PRN
Status: DISCONTINUED | OUTPATIENT
Start: 2022-05-08 | End: 2022-05-11

## 2022-05-08 RX ORDER — NALOXONE HYDROCHLORIDE 0.4 MG/ML
0.4 INJECTION, SOLUTION INTRAMUSCULAR; INTRAVENOUS; SUBCUTANEOUS
Status: DISCONTINUED | OUTPATIENT
Start: 2022-05-08 | End: 2022-05-26 | Stop reason: HOSPADM

## 2022-05-08 RX ORDER — PROPOFOL 10 MG/ML
50 INJECTION, EMULSION INTRAVENOUS 2 TIMES DAILY PRN
Status: DISCONTINUED | OUTPATIENT
Start: 2022-05-08 | End: 2022-05-08

## 2022-05-08 RX ORDER — NALOXONE HYDROCHLORIDE 0.4 MG/ML
0.2 INJECTION, SOLUTION INTRAMUSCULAR; INTRAVENOUS; SUBCUTANEOUS
Status: DISCONTINUED | OUTPATIENT
Start: 2022-05-08 | End: 2022-05-26 | Stop reason: HOSPADM

## 2022-05-08 RX ORDER — PROPOFOL 10 MG/ML
50-200 INJECTION, EMULSION INTRAVENOUS 2 TIMES DAILY PRN
Status: DISCONTINUED | OUTPATIENT
Start: 2022-05-08 | End: 2022-05-08

## 2022-05-08 RX ORDER — PIPERACILLIN SODIUM, TAZOBACTAM SODIUM 3; .375 G/15ML; G/15ML
3.38 INJECTION, POWDER, LYOPHILIZED, FOR SOLUTION INTRAVENOUS EVERY 6 HOURS
Status: DISCONTINUED | OUTPATIENT
Start: 2022-05-08 | End: 2022-05-09

## 2022-05-08 RX ORDER — PROPOFOL 10 MG/ML
10-20 INJECTION, EMULSION INTRAVENOUS EVERY 5 MIN PRN
Status: DISCONTINUED | OUTPATIENT
Start: 2022-05-08 | End: 2022-05-08

## 2022-05-08 RX ORDER — ENOXAPARIN SODIUM 100 MG/ML
1 INJECTION SUBCUTANEOUS EVERY 12 HOURS
Status: DISCONTINUED | OUTPATIENT
Start: 2022-05-08 | End: 2022-05-18

## 2022-05-08 RX ORDER — HEPARIN SODIUM 10000 [USP'U]/100ML
0-5000 INJECTION, SOLUTION INTRAVENOUS CONTINUOUS
Status: DISCONTINUED | OUTPATIENT
Start: 2022-05-08 | End: 2022-05-08

## 2022-05-08 RX ADMIN — DEXMEDETOMIDINE HYDROCHLORIDE 1.2 MCG/KG/HR: 400 INJECTION INTRAVENOUS at 11:37

## 2022-05-08 RX ADMIN — DEXMEDETOMIDINE HYDROCHLORIDE 1.2 MCG/KG/HR: 400 INJECTION INTRAVENOUS at 19:06

## 2022-05-08 RX ADMIN — INSULIN ASPART 1 UNITS: 100 INJECTION, SOLUTION INTRAVENOUS; SUBCUTANEOUS at 08:51

## 2022-05-08 RX ADMIN — FUROSEMIDE 20 MG: 10 INJECTION, SOLUTION INTRAVENOUS at 09:08

## 2022-05-08 RX ADMIN — PIPERACILLIN AND TAZOBACTAM 3.38 G: 3; .375 INJECTION, POWDER, LYOPHILIZED, FOR SOLUTION INTRAVENOUS at 15:53

## 2022-05-08 RX ADMIN — HEPARIN SODIUM 5000 UNITS: 5000 INJECTION, SOLUTION INTRAVENOUS; SUBCUTANEOUS at 04:30

## 2022-05-08 RX ADMIN — INSULIN ASPART 2 UNITS: 100 INJECTION, SOLUTION INTRAVENOUS; SUBCUTANEOUS at 11:37

## 2022-05-08 RX ADMIN — POTASSIUM CHLORIDE 40 MEQ: 1.5 POWDER, FOR SOLUTION ORAL at 06:13

## 2022-05-08 RX ADMIN — ACETAMINOPHEN 325MG 650 MG: 325 TABLET ORAL at 08:44

## 2022-05-08 RX ADMIN — DEXMEDETOMIDINE HYDROCHLORIDE 0.7 MCG/KG/HR: 400 INJECTION INTRAVENOUS at 01:51

## 2022-05-08 RX ADMIN — INSULIN ASPART 1 UNITS: 100 INJECTION, SOLUTION INTRAVENOUS; SUBCUTANEOUS at 04:58

## 2022-05-08 RX ADMIN — Medication 100 MCG/HR: at 19:06

## 2022-05-08 RX ADMIN — ACETAMINOPHEN 325MG 650 MG: 325 TABLET ORAL at 18:10

## 2022-05-08 RX ADMIN — FENTANYL CITRATE 50 MCG: 50 INJECTION, SOLUTION INTRAMUSCULAR; INTRAVENOUS at 08:30

## 2022-05-08 RX ADMIN — POTASSIUM CHLORIDE 40 MEQ: 1.5 POWDER, FOR SOLUTION ORAL at 09:16

## 2022-05-08 RX ADMIN — PIPERACILLIN AND TAZOBACTAM 3.38 G: 3; .375 INJECTION, POWDER, LYOPHILIZED, FOR SOLUTION INTRAVENOUS at 11:14

## 2022-05-08 RX ADMIN — DEXMEDETOMIDINE HYDROCHLORIDE 1.2 MCG/KG/HR: 400 INJECTION INTRAVENOUS at 15:40

## 2022-05-08 RX ADMIN — DOXAZOSIN 2 MG: 2 TABLET ORAL at 18:10

## 2022-05-08 RX ADMIN — HYDROCORTISONE SODIUM SUCCINATE 12.5 MG: 100 INJECTION, POWDER, FOR SOLUTION INTRAMUSCULAR; INTRAVENOUS at 21:02

## 2022-05-08 RX ADMIN — INSULIN ASPART 1 UNITS: 100 INJECTION, SOLUTION INTRAVENOUS; SUBCUTANEOUS at 15:59

## 2022-05-08 RX ADMIN — Medication 40 MG: at 11:09

## 2022-05-08 RX ADMIN — ENOXAPARIN SODIUM 90 MG: 100 INJECTION SUBCUTANEOUS at 13:13

## 2022-05-08 RX ADMIN — DEXMEDETOMIDINE HYDROCHLORIDE 0.7 MCG/KG/HR: 400 INJECTION INTRAVENOUS at 08:09

## 2022-05-08 RX ADMIN — Medication 2 PACKET: at 08:11

## 2022-05-08 RX ADMIN — Medication 100 MCG/HR: at 05:33

## 2022-05-08 RX ADMIN — FUROSEMIDE 20 MG: 10 INJECTION, SOLUTION INTRAMUSCULAR; INTRAVENOUS at 15:41

## 2022-05-08 RX ADMIN — POTASSIUM CHLORIDE 40 MEQ: 1.5 POWDER, FOR SOLUTION ORAL at 22:34

## 2022-05-08 RX ADMIN — FENTANYL CITRATE 50 MCG: 50 INJECTION, SOLUTION INTRAMUSCULAR; INTRAVENOUS at 15:42

## 2022-05-08 RX ADMIN — MIDAZOLAM 2 MG: 1 INJECTION INTRAMUSCULAR; INTRAVENOUS at 13:13

## 2022-05-08 RX ADMIN — Medication 50 MCG: at 15:43

## 2022-05-08 RX ADMIN — HYOSCYAMINE SULFATE: 16 SOLUTION at 21:03

## 2022-05-08 RX ADMIN — AMPICILLIN SODIUM AND SULBACTAM SODIUM 3 G: 2; 1 INJECTION, POWDER, FOR SOLUTION INTRAMUSCULAR; INTRAVENOUS at 04:30

## 2022-05-08 RX ADMIN — HYOSCYAMINE SULFATE: 16 SOLUTION at 08:11

## 2022-05-08 RX ADMIN — DEXMEDETOMIDINE HYDROCHLORIDE 1.2 MCG/KG/HR: 400 INJECTION INTRAVENOUS at 21:02

## 2022-05-08 RX ADMIN — PIPERACILLIN AND TAZOBACTAM 3.38 G: 3; .375 INJECTION, POWDER, LYOPHILIZED, FOR SOLUTION INTRAVENOUS at 22:34

## 2022-05-08 RX ADMIN — HYDROCORTISONE SODIUM SUCCINATE 12.5 MG: 100 INJECTION, POWDER, FOR SOLUTION INTRAMUSCULAR; INTRAVENOUS at 08:09

## 2022-05-08 RX ADMIN — MIDAZOLAM 2 MG: 1 INJECTION INTRAMUSCULAR; INTRAVENOUS at 09:08

## 2022-05-08 RX ADMIN — INSULIN ASPART 1 UNITS: 100 INJECTION, SOLUTION INTRAVENOUS; SUBCUTANEOUS at 21:15

## 2022-05-08 RX ADMIN — Medication 50 MCG: at 08:00

## 2022-05-08 RX ADMIN — Medication 100 MG: at 07:45

## 2022-05-08 RX ADMIN — HEPARIN SODIUM 5000 UNITS: 5000 INJECTION, SOLUTION INTRAVENOUS; SUBCUTANEOUS at 11:14

## 2022-05-08 RX ADMIN — Medication 2 PACKET: at 21:04

## 2022-05-08 ASSESSMENT — ACTIVITIES OF DAILY LIVING (ADL)
ADLS_ACUITY_SCORE: 9

## 2022-05-08 NOTE — PROGRESS NOTES
SURGICAL ICU PROGRESS NOTE  05/08/2022      PRIMARY TEAM: ENT  PRIMARY PHYSICIAN: Dr. Milena Gore MD  REASON FOR CRITICAL CARE ADMISSION: respiratory and hemodynamic monitoring   Date of Service (when I saw the patient): 05/08/2022    ASSESSMENT:  Robert Gallo is a 26 year old male who was admitted on 5/4/2022 with necrotizing soft tissue infection of the neck and mediastinum now POD 4 s/p BILATERAL VIDEO ASSISTED THORACOTOMY. RIGHT ANTERIOR MEDIASTINOTYOMY. PEG TUBE. INCISION AND DRAINAGE PARAPHARYNGEAL ABCESS ON RIGHT AND LEFT, remaining intubated and sedated per ENT primary, pending resolution of infection and closure of neck wound.    PLAN:  Changes today:  - ETT advancement today, evaluate repositioning with CXR   - Neck ultrasound to evaluate for thrombus; if still present, needs to be increased to therapeutic anticoagulation  - PICC removal   - Switch to Zosyn given resistance patterns  - Chest tube on water seal, removal tomorrow per thoracic   - Lasix 20 IV for pulm edema   - Wean hydrocortisone to 12.5 mg today, then plan to stop 5/9  - Fentanyl and versed for dressing changes   - Likely extubation tomorrow     Neurological:  # Acute pain   - Monitor neurological status. Delirium preventions and precautions.   - Pain: fentanyl, ketamine 30 mg PRN BID   - Sedation plan: versed and fentanyl for dressing changes, precedex gtt  - DC'd propofol gtt  - Sleep: melatonin      Pulmonary:   # Post operative ventilatory support  # Aspiration vs atelectasis  # Pulmonary Edema   - VENT: Continue PST daily and overnight with PRN vent support. Currently 550/20/30%/8. Ventilatory bundle. HOB elevation. To remain intubated until cleared by ENT given continued inflammation/abscess. Tentative plan to extubate 5/9.   - ETT advancement and repeat CXR  - CXR with small right sided pleural effusion, 20 mg IV lasix for pulm edema   - chest tube x2 to water seal per thoracic team, possible removal 5/9  - chest and neck  packing to be changed BID by ENT     Cardiovascular:    # Shock-distributive   - MAP consistently >85, improving, continue to monitor   - Not requiring pressors  - 20 mg IV Lasix (as above)    Gastroenterology/Nutrition:  - Last BM 5/8. Bowel regimen: senna BID and miralax daily  - Diet: Osmolite 1.5 at goal 65mL/hr    Fluids/Electrolytes:   # Hypokalemia, resolved  # Hypocalcemia  # Hypophosphatemia, resolved  # Hypernatremia  - Na improving, 145, 5/8  - 40 mEq KCL x2 given   - FWF 60 q4 hours (adjusted 5/7)   - Ca corrects to normal given hypoalbuminemia   - Replete lytes PRN   - Continue to monitor BMP daily     Renal:  - Urine output is adequate (1.1cc/kg/hr). Will continue to monitor intake and output.  - On doxazosin 2mg daily  - No feldman     Endocrine:  # Stress hyperglycemia   - Sliding scale for glucose management. Goal to keep BG< 180 for optimal wound healing   - HgbA1C 4.8%  - Weaning stress dose steroids, Hydrocortisone 12.5mg BID today, stop 5/9    ID:  # Necrotizing soft tissue infection of the odontogenic facial region with mediastinal involvement   # Septic thrombophlebitis of the left internal jugular  # Fever   # Leukocytosis, resolved    # Septic Shock, resolved    CT Neck 5/6 w/ No definite residual rim-enhancing collection however, the edematous collections could represent developing phlegmon.   - Zosyn, clindamycin discontinued per ID 5/6  - WBC, ESR and CRP downtrending, tmax 101.9. Blood cx (5/4) no growth. Soft tissue cx positive for Prevotella and Strep anginosus   - Lipase normal  - Discontinue Unasyn 3g q6h and switch to Zosyn QID given resistance patterns  - Remove PICC  - OMFS consult- no plan for further extractions at this time  - Thiamine 200 IV BID completed   - Obtain neck ultrasound to evaluate for thrombus given recent fevers     Heme:     # Acute blood loss anemia   # elevated Fibrinogen   - Hgb 9.7, stable  - Transfuse if hgb <7.0 or signs/symptoms of hypoperfusion. Monitor  and trend.   - If ultrasound shows thrombus, increase to therapeutic anticoagulation     Musculoskeletal:  - Physical and occupational therapy consult     Skin:  Routine SICU cares     General Cares/Prophylaxis:    DVT Prophylaxis: SQH, SCDs  GI Prophylaxis: PPI  Restraints: Restraints for medical healing needed: NO   Code Status: Full code     Lines/ tubes/ drains:  - ETT, left PIV x2 right PIV, 2 chest tubes (5/4), PEG (5/4), LUE PICC (5/4) - remove PICC today    Disposition:  - Surgical ICU.     Patient seen, findings and plan discussed with surgical ICU staff, Dr. Hemphill.    Kit Bird, MS4    I agree with the documentation provided by the medical student above and was present for the history and physical exam. I made updates or corrections as appropriate.     Anali Garner MD  SICU Resident    - - - - - - - - - - - - - - - - - - - - - - - - - - - - - - - - - - - - - - - - - - - - - -   Subjective: Patient intubated. Alert, interactive, and following commands. Continues to have fever. Cooling packs in place     REVIEW OF SYSTEMS: unable to obtain     PHYSICAL EXAMINATION:  Temp:  [99.4  F (37.4  C)-101.9  F (38.8  C)] 100.5  F (38.1  C)  Pulse:  [] 92  Resp:  [21-35] 23  BP: (118-150)/() 134/69  FiO2 (%):  [30 %] 30 %  SpO2:  [95 %-100 %] 99 %  General: intubated, interactive, cooling ice packs in place, feverish   HEENT: wrapped in Kerlix no strike through  Neuro: intubated, more alert, following commands, moves all extremities spontaneously   Pulm/Resp: CMV vented  CV: RRR, distal pulses palpable   Abdomen: Soft, non-distended, non-tender, no rebound tenderness or guarding, no masses, PEG   : No feldman  Incisions/Skin: packed and clean   MSK/Extremities: No peripheral edema, moving all extremities, calves equal, extremities well perfused    LABS: Reviewed.   Arterial Blood Gases   Recent Labs   Lab 05/04/22  0918 05/04/22  0555 05/04/22  0210   PH 7.37 7.28* 7.39   PCO2 41 51* 38   PO2 175*  136* 444*   HCO3 24 24 23     Complete Blood Count   Recent Labs   Lab 05/07/22 0454 05/06/22 0344 05/05/22 0353 05/04/22 0557   WBC 11.1* 9.7 7.6 12.4*   HGB 9.4* 8.0* 8.1* 10.0*    210 192 241     Basic Metabolic Panel  Recent Labs   Lab 05/07/22  2353 05/07/22 2016 05/07/22  1644 05/07/22  1148 05/07/22  0923 05/07/22 0454 05/06/22 0351 05/06/22 0344 05/05/22 0354 05/05/22 0353 05/04/22  0806 05/04/22 0557   NA  --   --   --   --   --  147*  --  145*  --  142  --  137   POTASSIUM  --   --   --   --   --  3.5  --  3.5  --  4.0  --  4.0  4.0   CHLORIDE  --   --   --   --   --  115*  --  112*  --  111*  --  108   CO2  --   --   --   --   --  28  --  27  --  27  --  25   BUN  --   --   --   --   --  22  --  25  --  23  --  31*   CR  --   --   --   --   --  0.76  --  0.82  --  0.82  --  1.12   * 144* 146* 171*   < > 171*   < > 146*   < > 167*   < > 131*    < > = values in this interval not displayed.     Liver Function Tests  Recent Labs   Lab 05/04/22 0557 05/04/22 0217   AST 32  --    ALT 26  --    ALKPHOS 93  --    BILITOTAL 1.5*  --    ALBUMIN 2.0*  --    INR 1.41* 1.36*     Pancreatic Enzymes  No lab results found in last 7 days.  Coagulation Profile  Recent Labs   Lab 05/04/22 0557 05/04/22 0217   INR 1.41* 1.36*   PTT 39* 44*       IMAGING:  Recent Results (from the past 24 hour(s))   XR Chest Port 1 View    Narrative    EXAM: XR CHEST PORT 1 VIEW  5/7/2022 6:39 PM     HISTORY:  Assess after CT placed to water seal from suction.       COMPARISON:  CT chest 5/6/2022    FINDINGS:     Portable semiupright view of the chest. Endotracheal tube projects  over upper thoracic trachea. Left arm PICC tip projects over the right  atrium. Bilateral chest tubes in place, right chest tube retracted  compared to prior. Trachea is midline. Cardiomediastinal silhouette  and pulmonary vasculature are within normal limits. Mixed interstitial  and airspace opacities predominantly in the right lower  lobe and  perihilar region. A small right pleural effusion. No appreciable  pneumothorax. No acute osseous abnormality. Visualized upper abdomen  is unremarkable.        Impression    IMPRESSION:     1. No appreciable pneumothorax. Bilateral chest is in place, right  chest tube retracted compared to prior x-ray on 5/4/2022   2. Small right pleural effusion and right basilar opacities which may  represent atelectasis versus infection.  3. Endotracheal tube tip projects 8 cm above the odilia. Consider  slight advancement  4. Left upper PICC tip projects over the right atrium. Consider  repositioning.    I have personally reviewed the examination and initial interpretation  and I agree with the findings.    SKY JIMENEZ MD         SYSTEM ID:  [id number]

## 2022-05-08 NOTE — PROGRESS NOTES
"Thoracic Surgery  Progress Note  May 7, 2022        S: Patient intubated but nods head and responds when addressed.    O:  /68   Pulse 75   Temp (!) 100.5  F (38.1  C) (Oral)   Resp 24   Ht 1.9 m (6' 2.8\")   Wt 88 kg (194 lb 0.1 oz)   SpO2 99%   BMI 24.38 kg/m      Physical Exam:  Gen: NAD, non-toxic appearing, intubated, sitting up in chair  Pulm: on vent, no dyssynchrony  Chest tubes: L and R CTs w/serosanguinous output and no appreciable air leak  CV: NSR on monitor, no edema  Wound: large neck wound and chest wound w/no active drainage    I&O:  I/O last 3 completed shifts:  In: 3698.44 [I.V.:1603.44; NG/GT:690]  Out: 1740 [Urine:1610; Chest Tube:130]  Chest tubes: R 0 mL yesterday, 10 since midnight; L 190 yesterday, 10 since midnight     Labs:  Results for orders placed or performed during the hospital encounter of 05/04/22 (from the past 24 hour(s))   Glucose by meter   Result Value Ref Range    GLUCOSE BY METER POCT 126 (H) 70 - 99 mg/dL   Glucose by meter   Result Value Ref Range    GLUCOSE BY METER POCT 161 (H) 70 - 99 mg/dL   Glucose by meter   Result Value Ref Range    GLUCOSE BY METER POCT 169 (H) 70 - 99 mg/dL   CBC with platelets   Result Value Ref Range    WBC Count 11.1 (H) 4.0 - 11.0 10e3/uL    RBC Count 3.12 (L) 4.40 - 5.90 10e6/uL    Hemoglobin 9.4 (L) 13.3 - 17.7 g/dL    Hematocrit 29.3 (L) 40.0 - 53.0 %    MCV 94 78 - 100 fL    MCH 30.1 26.5 - 33.0 pg    MCHC 32.1 31.5 - 36.5 g/dL    RDW 14.9 10.0 - 15.0 %    Platelet Count 317 150 - 450 10e3/uL   Basic metabolic panel   Result Value Ref Range    Sodium 147 (H) 133 - 144 mmol/L    Potassium 3.5 3.4 - 5.3 mmol/L    Chloride 115 (H) 94 - 109 mmol/L    Carbon Dioxide (CO2) 28 20 - 32 mmol/L    Anion Gap 4 3 - 14 mmol/L    Urea Nitrogen 22 7 - 30 mg/dL    Creatinine 0.76 0.66 - 1.25 mg/dL    Calcium 8.1 (L) 8.5 - 10.1 mg/dL    Glucose 171 (H) 70 - 99 mg/dL    GFR Estimate >90 >60 mL/min/1.73m2   Magnesium   Result Value Ref Range    " Magnesium 2.1 1.6 - 2.3 mg/dL   Phosphorus   Result Value Ref Range    Phosphorus 2.6 2.5 - 4.5 mg/dL   Erythrocyte sedimentation rate auto   Result Value Ref Range    Erythrocyte Sedimentation Rate 113 (H) 0 - 15 mm/hr   CRP inflammation   Result Value Ref Range    CRP Inflammation 160.0 (H) 0.0 - 8.0 mg/L   Lactic acid whole blood   Result Value Ref Range    Lactic Acid 0.9 0.7 - 2.0 mmol/L   Glucose by meter   Result Value Ref Range    GLUCOSE BY METER POCT 122 (H) 70 - 99 mg/dL   Glucose by meter   Result Value Ref Range    GLUCOSE BY METER POCT 171 (H) 70 - 99 mg/dL   Glucose by meter   Result Value Ref Range    GLUCOSE BY METER POCT 146 (H) 70 - 99 mg/dL   XR Chest Port 1 View    Impression    RESIDENT PRELIMINARY INTERPRETATION  IMPRESSION:   1. No appreciable pneumothorax. Bilateral chest is in place, right  chest tube retracted compared to prior, now in the right basilar  region.  2. Mixed interstitial and airspace opacity, may represent atelectasis  vs. infection.        A: Robert Gallo is a 26 year old with no significant PMHx who was admitted from [facility] [place] with necrotizing fasciitis of neck with mediastinal involvement s/p emergent bilateral VATS, right anterior mediastinotomy, I&D of bilateral pharyngeal abscess, and PEG tube placement 5/4 with Thoracic and ENT. He remains intubated but off pressors and sedation.    Recs:     - CTs to water seal today  - CXR this PM  - Likely CT removal tomorrow   - Remainder of cares per SICU team      Patient seen and discussed with staff.     Angie Villasenor MD  PGY-1, General Surgery

## 2022-05-08 NOTE — PLAN OF CARE
Major Shift Events:  Tmax 103 verified in both armpits and the mouth, tylenol given X2. Tube advanced to 27, gagging upon advancing. Precedex increased to 1.2 for anxiety and gagging on tube. Pressure support X2, failed X2 getting tachypneic and 2 minutes in.   Both chest tubes pulled by thoracic.   Lasix given X2, 3 L of output total  Loose stool X2  Ultrasound found DVT in L neck, Lovenox started.  Plan: Possible extubation tomorrow if pressure supporting well  For vital signs and complete assessments, please see documentation flowsheets.

## 2022-05-08 NOTE — PROGRESS NOTES
Otolaryngology Attending    Afternoon wound care and dressing change performed along with exam.  Submental penrose with purulent drainage, cleaned and probed with Qtip, no excess of purulence or collection found  Intraorally bilateral parapharyngeal spaces probed manually and jawline expressed and no purulence seen intraorally.    Neck clean with no noted progression of necrosis. All pockets explored closely. Packing in left neck: subcutaneous between skin and SCM laterally, Between SCM and straps and bellow straps over trachea and thryroid gland. On right, bellow strap, over thyroid superiorly.    Continues to be febrile with wounds trending in the positive direction.   Left internal jugular thrombus with concern for thrombophlebitis.   US confirms persistent presence of thrombus today.   OK with anticoagulation, removal of PICC line, Zosyn    If patient exam in the morning is consistent with improvement of parapharyngeal, submental and intraoral infection, extubation would be possible. Would need to be awake, with a good leak and protecting airway.    Tolerated wound care and dressing change today with Fentanyl bolus, responsive the whole time.

## 2022-05-08 NOTE — PROGRESS NOTES
"ENT Progress Note    S: Interactive. Febrile throughout the day last night    O  BP (!) 141/77   Pulse 86   Temp (!) 101.1  F (38.4  C) (Axillary)   Resp 24   Ht 1.9 m (6' 2.8\")   Wt 88 kg (194 lb 0.1 oz)   SpO2 96%   BMI 24.38 kg/m     Tm 101.9, MAPS 80s-100s, HR 60s  GENERAL Intubated, responsive  HEENT left parapharyngeal space loosly approximated, no purulence, penrose able to irrigate betadine into mouth. The neck looks improved, granulation tissue forming on strap muscles, no pus    Na 145, WBC 9.7 (11.1)   (160), lactic 1.1,  (113)  Tissue cx - strep anginosus (neck)  Wound cx - prevotella (oral cav)    CXR 5/8  1. No appreciable pneumothorax. Bilateral chest is in place, right  chest tube retracted compared to prior x-ray on 5/4/2022   2. Small right pleural effusion and right basilar opacities which may  represent atelectasis versus infection.  3. Endotracheal tube tip projects 8 cm above the odilia. Consider  slight advancement  4. Left upper PICC tip projects over the right atrium. Consider  Repositioning.    NECK US 5/8 - pending    Assessment and Plan  Robert Gallo is a 26 year old male with no significant past medical history with necrotizing fasciitis of the neck with mediastinal involvement s/p I&D of bilateral parapharyngeal spaces, sublingual space, bilateral necks, and extraction of teeth #19 and 20 and bilateral VATS and PEG tube placement by Thoracic Surgery on 5/4/2022. Inflammatory markers trending down but patient is febrile.    Neuro:  -Pain control and sedation per SICU  HEENT:  - Oral surgery: no further extractions currently, appreciate input  - Twice daily packing changes to the neck with Dakins soaked Kerlix to be performed by ENT  Respiratory:  - Intubated, mechanically ventilated  CV/heme:  - hemodynamically stable  FEN/GI:  - Status post PEG tube placement by Thoracic Surgery   :  - Richard in place  Endo  -ISS per sicu  Heme/ID:  - ID -  Will switch back to " unasyn  -Trend CBC, CRP, and ESR  - Follow-up OR cultures, new cultures obtained 5/6/2022 - susceptibilities added  - ICU planning on neck US today for left internal jugular thombus  PPX:  - subcutaneous heparin    - SCDs    Mj Erazo MD    D/w Dr. Gore

## 2022-05-08 NOTE — PROGRESS NOTES
STAFF NOTE:  Unable to tolerate attempts at ambulation yesterday due to debility/ exhaustion  Up to chair for prolonged periods and intermittently on pressure support, though  Tmax 100.3  BM this am    Exam awake and interactive  Huge amounts of thin secretion coming up from mouth and ETT  CAM-ICU negative  Both chest tubes to waterseal; not much coming out  Good cuff leak and strong cough  Per ENT, mouth position is much better and intra-oral edema is markedly down, with no further concerns about significant purulence coming from the drains  Large neck incision is packed; the edges looks fresh with some early granulation tissue  mediastinal wound packed  No feldman   PICC site looks fine  Multiple tattoos that appear to be done at home    Labs sodium still 145  Rest of lytes & renal function fine  CRP downtrending; WBC downtrending    I realize now that I missed L internal jugular thrombus on the earlier CT, concerning for Lemierre's.  With ongoing fever, I wonder there may be extension of thrombus to also involve the PICC now.    CXR today shows ETT to be high      This is a 26M, presumed otherwise healthy, who presented with bilateral dental / pharyngeal abscesses and concern for a necrotizing infection extending through the paratracheal gutters deep to strap muscles and into the mediastinum.      His wounds look good today, and ENT is feeling more comfortable about a decreased risk of upper airway obstruction.  We all therefore think tomorrow is a reasonable target for extubation.  From my perspective, he shouldn't need to be extubated over a tube exchanger - there was a good cuff leak, and as noted, tongue position and intra-oral edema are down quite a bit - so I think he'll be an easy extubation.    He also tolerated dressing changes with just 100mcg of fentanyl (he was on a Precedex drip, but pretty darn awake with this), so I'm pretty confident that he would continue to tolerate dressing changes after  extubation (could use ketamine to supplement fentanyl if necessary).    Will gently diurese him today, get an ultrasound of the neck and potentially start anticoagulation (bid lovenox should be fine) if thrombus is still present.  Will also remove the PICC just out of concern for extension of thrombus.  Will also re-broaden antibiotics to Zosyn (should be lower risk of resistance than with Unasyn, although based on the bugs we got from culture, they should be well-covered with Unasyn) with the recurrent fever and clot, although just going by inflammatory markers such as CRP / WBC, I do not have concern for a new infection like pneumonia, UTI, etc.    METABOLIC ENCEPHALOPATHY / DELIRIUM:  -due to infection, sedation; monitoring with clinical exam  -melatonin for sleep; precedex for sedation;     ACUTE HYPOXIC RESPIRATORY FAILURE  -resolved.  Anticipate extubation tomorrow morning assuming no issues after dressing change this evening and after (if) we start therapeutic anticoagultion    PHARYNGEAL / SUBMENTAL / NECK ABSCESS:  -re-broaden to Zosyn as above given thrombus in the internal jugular (no concerns about existing wounds or new distal infection)  -wet-to-dry bid dressing  -drain in left submental space by ENT   -HIV negative  -trend CRP / ESR    SEVERE SEPSIS:  -resolved; off pressors   -antibiotics as above  -thiamine 200mg IV q12h can stop;  hydrocortisone will complete      ACUTE BLOOD LOSS ANEMIA:  -no current indication for transfusion    SEVERE PROTEIN-CALORIE MALNUTRITION:  -aggressively replacing Phos+ & Mg++  -gastric feeds at goal.  Will hold in anticipation of extubation in the morning    HYPERNATREMIA:  -free water    MISC:  -Code status is full code  -father updated by me at bedside; mother Eloina updated by resident by phone  -[facility];may transition to therapeutic anticoagulation; PPI for intubation  -lines: PICC will be removed given concern for extension of thrombus.    -feldman out.  On doxazosin for  prior urinary retention.  Continue this until off sedation (ie, extubated)  -anticipate discharge to acute rehab in >>1 week    Billing statement: 54min of critical care time; spent in an initial review of imaging, labs, physical exam, and discussion of the patient with my own team and the extended care team including the primary service. Based on this patient's presentation / recent intervention and my bedside assessment, I felt there was or is a reasonably high probability of imminent or life-threatening deterioration today or tonight for septic  reasons.   My overall critical care time, as described in detail above, includes such things as coordination of care, arrhythmia and hemodynamics management with infusions of medicines, respiratory management, fluid therapy including fluid boluses, and pain and sedation therapy. This time excludes time I spent personally performing or supervising procedures for this patient.    OPAL Hemphill MD  Clinical   Anesthesia / Critical Care  *[phone]

## 2022-05-08 NOTE — PROGRESS NOTES
STAFF ADDENDUM:  I saw and evaluated Mr. Cleo Mendoza. Remove chest tubes today    Radha Ortiz MD

## 2022-05-08 NOTE — PROGRESS NOTES
"Thoracic Surgery  Progress Note  May 8, 2022        S: Patient febrile to 101.1F overnight otherwise NAEO. Patient remains intubated but able to communicate with hand signals. Indicates that he is doing \"so-so\" today.     O:  BP (!) 141/77   Pulse 86   Temp (!) 101.1  F (38.4  C) (Axillary)   Resp 24   Ht 1.9 m (6' 2.8\")   Wt 88 kg (194 lb 0.1 oz)   SpO2 96%   BMI 24.38 kg/m      Physical Exam:  Gen: NAD, intubated, laying in bed  Pulm: on vent, no dyssynchrony  Chest tubes: L and R CTs w/serosanguinous output and no appreciable air leak  CV: NSR on monitor  Extr: wwp, no edema  Wound: large neck wound and chest wound w/no active drainage    I&O:  I/O last 3 completed shifts:  In: 3155.89 [I.V.:1035.89; NG/GT:570]  Out: 2500 [Urine:2340; Chest Tube:160]  Chest tubes: R 20 mL yesterday, 0 since midnight; L 140 yesterday, 20 since midnight     Labs and imaging:  Reviewed.      A: Robert Gallo is a 26 year old with no significant PMHx who was [place] [facility] [place] necrotizing fasciitis of neck with mediastinal involvement s/p emergent bilateral VATS, right anterior mediastinotomy, I&D of bilateral pharyngeal abscess, and PEG tube placement 5/4 with Thoracic and ENT. He remains intubated today. Chest tubes with low output so plan to remove both today.    Recs:     - Remove CTs  - Post-CT removal CXR  - Remainder of cares per SICU team      Patient seen and discussed with staff.     Angie Villasenor MD  PGY-1, General Surgery        "

## 2022-05-09 ENCOUNTER — APPOINTMENT (OUTPATIENT)
Dept: GENERAL RADIOLOGY | Facility: CLINIC | Age: 27
End: 2022-05-09
Attending: STUDENT IN AN ORGANIZED HEALTH CARE EDUCATION/TRAINING PROGRAM
Payer: COMMERCIAL

## 2022-05-09 ENCOUNTER — APPOINTMENT (OUTPATIENT)
Dept: OCCUPATIONAL THERAPY | Facility: CLINIC | Age: 27
End: 2022-05-09
Attending: STUDENT IN AN ORGANIZED HEALTH CARE EDUCATION/TRAINING PROGRAM
Payer: COMMERCIAL

## 2022-05-09 ENCOUNTER — APPOINTMENT (OUTPATIENT)
Dept: CT IMAGING | Facility: CLINIC | Age: 27
End: 2022-05-09
Payer: COMMERCIAL

## 2022-05-09 LAB
ALBUMIN SERPL-MCNC: 1.9 G/DL (ref 3.4–5)
ALP SERPL-CCNC: 164 U/L (ref 40–150)
ALT SERPL W P-5'-P-CCNC: 110 U/L (ref 0–70)
ANION GAP SERPL CALCULATED.3IONS-SCNC: 5 MMOL/L (ref 3–14)
ANION GAP SERPL CALCULATED.3IONS-SCNC: 6 MMOL/L (ref 3–14)
AST SERPL W P-5'-P-CCNC: 75 U/L (ref 0–45)
BACTERIA BLD CULT: NO GROWTH
BACTERIA BLD CULT: NO GROWTH
BILIRUB DIRECT SERPL-MCNC: 0.3 MG/DL (ref 0–0.2)
BILIRUB SERPL-MCNC: 0.6 MG/DL (ref 0.2–1.3)
BUN SERPL-MCNC: 21 MG/DL (ref 7–30)
BUN SERPL-MCNC: 22 MG/DL (ref 7–30)
CALCIUM SERPL-MCNC: 8.3 MG/DL (ref 8.5–10.1)
CALCIUM SERPL-MCNC: 8.4 MG/DL (ref 8.5–10.1)
CHLORIDE BLD-SCNC: 110 MMOL/L (ref 94–109)
CHLORIDE BLD-SCNC: 111 MMOL/L (ref 94–109)
CO2 SERPL-SCNC: 27 MMOL/L (ref 20–32)
CO2 SERPL-SCNC: 30 MMOL/L (ref 20–32)
CREAT SERPL-MCNC: 0.78 MG/DL (ref 0.66–1.25)
CREAT SERPL-MCNC: 0.85 MG/DL (ref 0.66–1.25)
CRP SERPL-MCNC: 160 MG/L (ref 0–8)
ERYTHROCYTE [DISTWIDTH] IN BLOOD BY AUTOMATED COUNT: 14.5 % (ref 10–15)
ERYTHROCYTE [DISTWIDTH] IN BLOOD BY AUTOMATED COUNT: 14.6 % (ref 10–15)
ERYTHROCYTE [SEDIMENTATION RATE] IN BLOOD BY WESTERGREN METHOD: 96 MM/HR (ref 0–15)
GFR SERPL CREATININE-BSD FRML MDRD: >90 ML/MIN/1.73M2
GFR SERPL CREATININE-BSD FRML MDRD: >90 ML/MIN/1.73M2
GLUCOSE BLD-MCNC: 141 MG/DL (ref 70–99)
GLUCOSE BLD-MCNC: 165 MG/DL (ref 70–99)
GLUCOSE BLDC GLUCOMTR-MCNC: 101 MG/DL (ref 70–99)
GLUCOSE BLDC GLUCOMTR-MCNC: 102 MG/DL (ref 70–99)
GLUCOSE BLDC GLUCOMTR-MCNC: 154 MG/DL (ref 70–99)
GLUCOSE BLDC GLUCOMTR-MCNC: 160 MG/DL (ref 70–99)
GLUCOSE BLDC GLUCOMTR-MCNC: 166 MG/DL (ref 70–99)
GLUCOSE BLDC GLUCOMTR-MCNC: 178 MG/DL (ref 70–99)
HCT VFR BLD AUTO: 31.1 % (ref 40–53)
HCT VFR BLD AUTO: 32.6 % (ref 40–53)
HGB BLD-MCNC: 10.2 G/DL (ref 13.3–17.7)
HGB BLD-MCNC: 9.7 G/DL (ref 13.3–17.7)
LACTATE SERPL-SCNC: 1.5 MMOL/L (ref 0.7–2)
LACTATE SERPL-SCNC: 1.6 MMOL/L (ref 0.7–2)
MAGNESIUM SERPL-MCNC: 2.1 MG/DL (ref 1.6–2.3)
MAGNESIUM SERPL-MCNC: 2.4 MG/DL (ref 1.6–2.3)
MCH RBC QN AUTO: 29.7 PG (ref 26.5–33)
MCH RBC QN AUTO: 29.8 PG (ref 26.5–33)
MCHC RBC AUTO-ENTMCNC: 31.2 G/DL (ref 31.5–36.5)
MCHC RBC AUTO-ENTMCNC: 31.3 G/DL (ref 31.5–36.5)
MCV RBC AUTO: 95 FL (ref 78–100)
MCV RBC AUTO: 95 FL (ref 78–100)
PHOSPHATE SERPL-MCNC: 2.1 MG/DL (ref 2.5–4.5)
PHOSPHATE SERPL-MCNC: 3.6 MG/DL (ref 2.5–4.5)
PLATELET # BLD AUTO: 330 10E3/UL (ref 150–450)
PLATELET # BLD AUTO: 333 10E3/UL (ref 150–450)
POTASSIUM BLD-SCNC: 3.6 MMOL/L (ref 3.4–5.3)
POTASSIUM BLD-SCNC: 3.8 MMOL/L (ref 3.4–5.3)
POTASSIUM BLD-SCNC: 3.8 MMOL/L (ref 3.4–5.3)
PROT SERPL-MCNC: 6.5 G/DL (ref 6.8–8.8)
RBC # BLD AUTO: 3.26 10E6/UL (ref 4.4–5.9)
RBC # BLD AUTO: 3.44 10E6/UL (ref 4.4–5.9)
SODIUM SERPL-SCNC: 144 MMOL/L (ref 133–144)
SODIUM SERPL-SCNC: 145 MMOL/L (ref 133–144)
WBC # BLD AUTO: 8.1 10E3/UL (ref 4–11)
WBC # BLD AUTO: 9.3 10E3/UL (ref 4–11)

## 2022-05-09 PROCEDURE — 250N000011 HC RX IP 250 OP 636: Performed by: OTOLARYNGOLOGY

## 2022-05-09 PROCEDURE — 999N000259 HC STATISTIC EXTUBATION

## 2022-05-09 PROCEDURE — 82248 BILIRUBIN DIRECT: CPT

## 2022-05-09 PROCEDURE — 250N000013 HC RX MED GY IP 250 OP 250 PS 637

## 2022-05-09 PROCEDURE — 250N000013 HC RX MED GY IP 250 OP 250 PS 637: Performed by: PHYSICIAN ASSISTANT

## 2022-05-09 PROCEDURE — 83735 ASSAY OF MAGNESIUM: CPT

## 2022-05-09 PROCEDURE — 36415 COLL VENOUS BLD VENIPUNCTURE: CPT

## 2022-05-09 PROCEDURE — 250N000009 HC RX 250: Performed by: ANESTHESIOLOGY

## 2022-05-09 PROCEDURE — 86140 C-REACTIVE PROTEIN: CPT

## 2022-05-09 PROCEDURE — 250N000009 HC RX 250: Performed by: STUDENT IN AN ORGANIZED HEALTH CARE EDUCATION/TRAINING PROGRAM

## 2022-05-09 PROCEDURE — 200N000002 HC R&B ICU UMMC

## 2022-05-09 PROCEDURE — 87040 BLOOD CULTURE FOR BACTERIA: CPT | Performed by: STUDENT IN AN ORGANIZED HEALTH CARE EDUCATION/TRAINING PROGRAM

## 2022-05-09 PROCEDURE — 999N000185 HC STATISTIC TRANSPORT TIME EA 15 MIN

## 2022-05-09 PROCEDURE — 84132 ASSAY OF SERUM POTASSIUM: CPT | Performed by: OTOLARYNGOLOGY

## 2022-05-09 PROCEDURE — 250N000011 HC RX IP 250 OP 636: Performed by: PHYSICIAN ASSISTANT

## 2022-05-09 PROCEDURE — 83735 ASSAY OF MAGNESIUM: CPT | Performed by: STUDENT IN AN ORGANIZED HEALTH CARE EDUCATION/TRAINING PROGRAM

## 2022-05-09 PROCEDURE — 99233 SBSQ HOSP IP/OBS HIGH 50: CPT | Performed by: INTERNAL MEDICINE

## 2022-05-09 PROCEDURE — 70491 CT SOFT TISSUE NECK W/DYE: CPT | Mod: 26 | Performed by: RADIOLOGY

## 2022-05-09 PROCEDURE — 94003 VENT MGMT INPAT SUBQ DAY: CPT

## 2022-05-09 PROCEDURE — 250N000011 HC RX IP 250 OP 636

## 2022-05-09 PROCEDURE — 83605 ASSAY OF LACTIC ACID: CPT | Performed by: STUDENT IN AN ORGANIZED HEALTH CARE EDUCATION/TRAINING PROGRAM

## 2022-05-09 PROCEDURE — 36415 COLL VENOUS BLD VENIPUNCTURE: CPT | Performed by: STUDENT IN AN ORGANIZED HEALTH CARE EDUCATION/TRAINING PROGRAM

## 2022-05-09 PROCEDURE — 999N000253 HC STATISTIC WEANING TRIALS

## 2022-05-09 PROCEDURE — 71045 X-RAY EXAM CHEST 1 VIEW: CPT | Mod: 26 | Performed by: RADIOLOGY

## 2022-05-09 PROCEDURE — 70491 CT SOFT TISSUE NECK W/DYE: CPT

## 2022-05-09 PROCEDURE — 97530 THERAPEUTIC ACTIVITIES: CPT | Mod: GO

## 2022-05-09 PROCEDURE — 84100 ASSAY OF PHOSPHORUS: CPT | Performed by: STUDENT IN AN ORGANIZED HEALTH CARE EDUCATION/TRAINING PROGRAM

## 2022-05-09 PROCEDURE — 84100 ASSAY OF PHOSPHORUS: CPT

## 2022-05-09 PROCEDURE — 250N000013 HC RX MED GY IP 250 OP 250 PS 637: Performed by: STUDENT IN AN ORGANIZED HEALTH CARE EDUCATION/TRAINING PROGRAM

## 2022-05-09 PROCEDURE — 85027 COMPLETE CBC AUTOMATED: CPT | Performed by: STUDENT IN AN ORGANIZED HEALTH CARE EDUCATION/TRAINING PROGRAM

## 2022-05-09 PROCEDURE — 93005 ELECTROCARDIOGRAM TRACING: CPT

## 2022-05-09 PROCEDURE — 93010 ELECTROCARDIOGRAM REPORT: CPT | Mod: 76 | Performed by: INTERNAL MEDICINE

## 2022-05-09 PROCEDURE — 999N000157 HC STATISTIC RCP TIME EA 10 MIN

## 2022-05-09 PROCEDURE — 85652 RBC SED RATE AUTOMATED: CPT

## 2022-05-09 PROCEDURE — 84132 ASSAY OF SERUM POTASSIUM: CPT | Performed by: STUDENT IN AN ORGANIZED HEALTH CARE EDUCATION/TRAINING PROGRAM

## 2022-05-09 PROCEDURE — 82310 ASSAY OF CALCIUM: CPT

## 2022-05-09 PROCEDURE — 71045 X-RAY EXAM CHEST 1 VIEW: CPT

## 2022-05-09 PROCEDURE — 250N000013 HC RX MED GY IP 250 OP 250 PS 637: Performed by: OTOLARYNGOLOGY

## 2022-05-09 PROCEDURE — 97165 OT EVAL LOW COMPLEX 30 MIN: CPT | Mod: GO

## 2022-05-09 PROCEDURE — 85014 HEMATOCRIT: CPT

## 2022-05-09 PROCEDURE — 99291 CRITICAL CARE FIRST HOUR: CPT | Mod: GC | Performed by: SURGERY

## 2022-05-09 RX ORDER — AMPICILLIN AND SULBACTAM 2; 1 G/1; G/1
3 INJECTION, POWDER, FOR SOLUTION INTRAMUSCULAR; INTRAVENOUS EVERY 6 HOURS
Status: DISCONTINUED | OUTPATIENT
Start: 2022-05-09 | End: 2022-05-10

## 2022-05-09 RX ORDER — HYDROMORPHONE HYDROCHLORIDE 1 MG/ML
.3-.5 INJECTION, SOLUTION INTRAMUSCULAR; INTRAVENOUS; SUBCUTANEOUS
Status: DISCONTINUED | OUTPATIENT
Start: 2022-05-09 | End: 2022-05-10

## 2022-05-09 RX ORDER — FLUCONAZOLE 2 MG/ML
400 INJECTION, SOLUTION INTRAVENOUS EVERY 24 HOURS
Status: DISCONTINUED | OUTPATIENT
Start: 2022-05-09 | End: 2022-05-18

## 2022-05-09 RX ORDER — POTASSIUM CHLORIDE 1.5 G/1.58G
20 POWDER, FOR SOLUTION ORAL ONCE
Status: COMPLETED | OUTPATIENT
Start: 2022-05-09 | End: 2022-05-09

## 2022-05-09 RX ORDER — IOPAMIDOL 755 MG/ML
100 INJECTION, SOLUTION INTRAVASCULAR ONCE
Status: COMPLETED | OUTPATIENT
Start: 2022-05-09 | End: 2022-05-09

## 2022-05-09 RX ADMIN — INSULIN ASPART 1 UNITS: 100 INJECTION, SOLUTION INTRAVENOUS; SUBCUTANEOUS at 20:03

## 2022-05-09 RX ADMIN — INSULIN ASPART 1 UNITS: 100 INJECTION, SOLUTION INTRAVENOUS; SUBCUTANEOUS at 00:38

## 2022-05-09 RX ADMIN — AMPICILLIN SODIUM AND SULBACTAM SODIUM 3 G: 2; 1 INJECTION, POWDER, FOR SOLUTION INTRAMUSCULAR; INTRAVENOUS at 16:17

## 2022-05-09 RX ADMIN — INSULIN ASPART 1 UNITS: 100 INJECTION, SOLUTION INTRAVENOUS; SUBCUTANEOUS at 04:30

## 2022-05-09 RX ADMIN — MIDAZOLAM 2 MG: 1 INJECTION INTRAMUSCULAR; INTRAVENOUS at 14:09

## 2022-05-09 RX ADMIN — Medication 30 MG: at 14:11

## 2022-05-09 RX ADMIN — POTASSIUM CHLORIDE 20 MEQ: 1.5 POWDER, FOR SOLUTION ORAL at 09:20

## 2022-05-09 RX ADMIN — PIPERACILLIN AND TAZOBACTAM 3.38 G: 3; .375 INJECTION, POWDER, LYOPHILIZED, FOR SOLUTION INTRAVENOUS at 04:27

## 2022-05-09 RX ADMIN — FENTANYL CITRATE 50 MCG: 50 INJECTION, SOLUTION INTRAMUSCULAR; INTRAVENOUS at 14:19

## 2022-05-09 RX ADMIN — ENOXAPARIN SODIUM 90 MG: 100 INJECTION SUBCUTANEOUS at 00:31

## 2022-05-09 RX ADMIN — DEXMEDETOMIDINE HYDROCHLORIDE 1.2 MCG/KG/HR: 400 INJECTION INTRAVENOUS at 13:52

## 2022-05-09 RX ADMIN — Medication 2 PACKET: at 07:52

## 2022-05-09 RX ADMIN — ENOXAPARIN SODIUM 90 MG: 100 INJECTION SUBCUTANEOUS at 11:58

## 2022-05-09 RX ADMIN — Medication 2 PACKET: at 20:04

## 2022-05-09 RX ADMIN — Medication 50 MCG: at 14:10

## 2022-05-09 RX ADMIN — FENTANYL CITRATE 50 MCG: 50 INJECTION, SOLUTION INTRAMUSCULAR; INTRAVENOUS at 14:12

## 2022-05-09 RX ADMIN — HYDROMORPHONE HYDROCHLORIDE 0.5 MG: 1 INJECTION, SOLUTION INTRAMUSCULAR; INTRAVENOUS; SUBCUTANEOUS at 18:21

## 2022-05-09 RX ADMIN — AMPICILLIN SODIUM AND SULBACTAM SODIUM 3 G: 2; 1 INJECTION, POWDER, FOR SOLUTION INTRAMUSCULAR; INTRAVENOUS at 10:33

## 2022-05-09 RX ADMIN — AMPICILLIN SODIUM AND SULBACTAM SODIUM 3 G: 2; 1 INJECTION, POWDER, FOR SOLUTION INTRAMUSCULAR; INTRAVENOUS at 21:56

## 2022-05-09 RX ADMIN — HYOSCYAMINE SULFATE: 16 SOLUTION at 19:34

## 2022-05-09 RX ADMIN — IOPAMIDOL 100 ML: 755 INJECTION, SOLUTION INTRAVENOUS at 10:08

## 2022-05-09 RX ADMIN — Medication 40 MG: at 09:20

## 2022-05-09 RX ADMIN — ACETAMINOPHEN 325MG 650 MG: 325 TABLET ORAL at 22:13

## 2022-05-09 RX ADMIN — INSULIN ASPART 1 UNITS: 100 INJECTION, SOLUTION INTRAVENOUS; SUBCUTANEOUS at 07:46

## 2022-05-09 RX ADMIN — FLUCONAZOLE 400 MG: 2 INJECTION INTRAVENOUS at 13:41

## 2022-05-09 RX ADMIN — DOXAZOSIN 2 MG: 2 TABLET ORAL at 17:47

## 2022-05-09 RX ADMIN — DEXMEDETOMIDINE HYDROCHLORIDE 1.2 MCG/KG/HR: 400 INJECTION INTRAVENOUS at 06:15

## 2022-05-09 RX ADMIN — DEXMEDETOMIDINE HYDROCHLORIDE 1.2 MCG/KG/HR: 400 INJECTION INTRAVENOUS at 09:55

## 2022-05-09 RX ADMIN — HYOSCYAMINE SULFATE: 16 SOLUTION at 07:47

## 2022-05-09 RX ADMIN — DEXMEDETOMIDINE HYDROCHLORIDE 1.2 MCG/KG/HR: 400 INJECTION INTRAVENOUS at 00:31

## 2022-05-09 ASSESSMENT — ACTIVITIES OF DAILY LIVING (ADL)
ADLS_ACUITY_SCORE: 26
ADLS_ACUITY_SCORE: 9
ADLS_ACUITY_SCORE: 26
ADLS_ACUITY_SCORE: 9
ADLS_ACUITY_SCORE: 9
ADLS_ACUITY_SCORE: 26
ADLS_ACUITY_SCORE: 9
ADLS_ACUITY_SCORE: 26
ADLS_ACUITY_SCORE: 9
ADLS_ACUITY_SCORE: 26
ADLS_ACUITY_SCORE: 9
ADLS_ACUITY_SCORE: 26
ADLS_ACUITY_SCORE: 26
ADLS_ACUITY_SCORE: 9
ADLS_ACUITY_SCORE: 26
ADLS_ACUITY_SCORE: 9

## 2022-05-09 NOTE — PLAN OF CARE
Major Shift Events: Slept for several hours. Did not complain of pain. Bradycardic in the 50's. BP WNL. Vent settings unchanged. Large amounts of cloudy, thin secretions. Tube feeding at goal. Adequate UO. No BM. K replaced at start of shift. Waiting for am labs to be drawn.     Plan: Continue with current POC    For vital signs and complete assessments, please see documentation flowsheets.

## 2022-05-09 NOTE — PROGRESS NOTES
SURGICAL ICU PROGRESS NOTE  05/09/2022      PRIMARY TEAM: ENT  PRIMARY PHYSICIAN: Dr. Milena Gore MD  REASON FOR CRITICAL CARE ADMISSION: respiratory and hemodynamic monitoring   Date of Service (when I saw the patient): 05/09/2022    ASSESSMENT:  Robert Gallo is a 26 year old male who was admitted on 5/4/2022 with necrotizing soft tissue infection of the neck and mediastinum now POD 4 s/p BILATERAL VIDEO ASSISTED THORACOTOMY. RIGHT ANTERIOR MEDIASTINOTYOMY. PEG TUBE. INCISION AND DRAINAGE PARAPHARYNGEAL ABCESS ON RIGHT AND LEFT, remaining intubated and sedated per ENT primary, pending resolution of infection and closure of neck wound.    PLAN:  Changes today:  - CT scan today for uptrending CRP (160)  - Narrow to Unasyn  - Follow up ID recs for initiating fluconazole  - Holding tube feeds pending RTOR/extubation    Neurological:  # Acute pain   - Monitor neurological status. Delirium preventions and precautions.   - Currently requiring fentanyl and versed for dressing changes   - Pain: fentanyl; will consider dilaudid if extubated   - Sedation plan: precedex gtt, versed, ketamine 30 mg PRN BID; will likely only keep ketamine if extubated   - Sleep: Melatonin      Pulmonary:   # Post operative ventilatory support  # Aspiration vs atelectasis  # Pulmonary Edema   - VENT: Continue PST daily and overnight with PRN vent support. Currently 550/20/30%/8. Ventilatory bundle. HOB elevation.   - Tentative plan to extubate today (5/9) pending CT results   - CXR with small right sided pleural effusion, chest tube removed on 5/8  - Chest and neck packing to be changed BID by ENT     Cardiovascular:    # Shock-distributive   - MAP consistently >85, improving, continue to monitor   - Not requiring pressors    Gastroenterology/Nutrition:  - Last BM 5/8. Bowel regimen: senna BID and miralax daily  - Hold tube feeds pending RTOR/extubation  - Diet: Osmolite 1.5 at goal 65mL/hr    Fluids/Electrolytes:   # Hypernatremia  #  Hypocalcemia  # Hypophosphatemia, resolved  # Hypokalemia, resolved  - Na improving, 145, 5/8  - FWF 60 q4 hours (adjusted 5/7)   - Ca corrects to normal given hypoalbuminemia   - Replete lytes PRN   - Continue to monitor BMP daily     Renal:  - Urine output is adequate (0.9cc/kg/hr). Will continue to monitor intake and output.  - On doxazosin 2mg daily    Endocrine:  # Stress hyperglycemia   - Sliding scale for glucose management. Goal to keep BG< 180 for optimal wound healing   - HgbA1C 4.8%  - Discontinue hydrocortisone    ID:  # Necrotizing soft tissue infection of the odontogenic facial region with mediastinal involvement   # Septic thrombophlebitis of the left internal jugular  # Fever   # Leukocytosis, resolved    # Septic Shock, resolved    CT Neck 5/6 w/ no definite residual rim-enhancing collection however, the edematous collections could represent developing phlegmon.   - Change back to unasyn from zosyn 5/9  - Repeat CT today given CRP uptrending  - WBC, ESR downtrending    - Blood cx (5/4,5/6) no growth   - Soft tissue cx positive (5/4) for Prevotella and Strep anginosus   - Respiratory cx (5/6): 1+ candida dubliniensis, oral abscess (5/6): 4+ yeast, will start fluconazole  - Lipase, lactate normal, transaminases elevated   - OMFS consult- no plan for further extractions at this time  - Thiamine 200 IV BID completed     Heme:     # Acute blood loss anemia   # Elevated Fibrinogen   - Hgb 9.7, stable  - Transfuse if hgb <7.0 or signs/symptoms of hypoperfusion. Monitor and trend.   - Continue therapeutic lovenox 90 mg BID for L IJ  Thrombus, confirmed by ultrasound (5/8)    Musculoskeletal:  - Physical and occupational therapy consult     Skin:  Routine SICU cares     General Cares/Prophylaxis:    DVT Prophylaxis: SQH, SCDs  GI Prophylaxis: PPI  Restraints: Restraints for medical healing needed: NO   Code Status: Full code     Lines/ tubes/ drains:  - ETT, left PIV x2 right PIV, PEG (5/4),      Disposition:  - Surgical ICU.     Patient seen, findings and plan discussed with surgical ICU staff, Dr. Travis.       I, Mica Olmedo MD, was present with the medical/NAGI student who participated in the service and in the documentation of the note.  I have verified the history and personally performed the physical exam and medical decision making.  I agree with the assessment and plan of care as documented in the note.          Mica Olmedo MD  PGY1  Date of Service (when I saw the patient): 05/09/22    Kit Bird, MS4    I agree with the documentation provided by the medical student above and was present for the history and physical exam. I made updates or corrections as appropriate.       - - - - - - - - - - - - - - - - - - - - - - - - - - - - - - - - - - - - - - - - - - - - - -   Subjective: Afebrile this morning. Following commands and appropriately interactive. PST going well today. Will get CT today soon.     REVIEW OF SYSTEMS: Otherwise negative     PHYSICAL EXAMINATION:  Temp:  [97.5  F (36.4  C)-103  F (39.4  C)] 97.5  F (36.4  C)  Pulse:  [] 60  Resp:  [20-25] 20  BP: (114-165)/(58-92) 114/60  FiO2 (%):  [30 %] 30 %  SpO2:  [90 %-99 %] 99 %  General: PST, interactive, afebrile   HEENT: neck packing in place  Neuro: PST, alert, following commands, moves all extremities spontaneously   Pulm/Resp: PST  CV: RRR, distal pulses palpable   Abdomen: Soft, non-distended, non-tender, no rebound tenderness or guarding, no masses, PEG   : No feldman  Incisions/Skin: packed and clean   MSK/Extremities: No peripheral edema, moving all extremities, calves equal, extremities well perfused    LABS: Reviewed.   Arterial Blood Gases   Recent Labs   Lab 05/04/22  0918 05/04/22  0555 05/04/22  0210   PH 7.37 7.28* 7.39   PCO2 41 51* 38   PO2 175* 136* 444*   HCO3 24 24 23     Complete Blood Count   Recent Labs   Lab 05/08/22  0456 05/07/22  0454 05/06/22  0344 05/05/22  0353   WBC 9.7 11.1* 9.7 7.6   HGB  9.6* 9.4* 8.0* 8.1*    317 210 192     Basic Metabolic Panel  Recent Labs   Lab 05/09/22  0430 05/09/22  0038 05/08/22  2114 05/08/22  1944 05/08/22  1559 05/08/22  1120 05/08/22  1116 05/08/22  0457 05/08/22  0456 05/07/22  0923 05/07/22  0454 05/06/22  0351 05/06/22  0344 05/05/22  0354 05/05/22 0353   NA  --   --   --   --   --   --   --   --  145*  --  147*  --  145*  --  142   POTASSIUM  --   --   --  3.3*  --   --  3.6  --  3.4  --  3.5  --  3.5  --  4.0   CHLORIDE  --   --   --   --   --   --   --   --  114*  --  115*  --  112*  --  111*   CO2  --   --   --   --   --   --   --   --  26  --  28  --  27  --  27   BUN  --   --   --   --   --   --   --   --  16  --  22  --  25  --  23   CR  --   --   --   --   --   --   --   --  0.66  --  0.76  --  0.82  --  0.82   * 166* 140*  --  172*   < >  --    < > 152*   < > 171*   < > 146*   < > 167*    < > = values in this interval not displayed.     Liver Function Tests  Recent Labs   Lab 05/08/22 0456 05/04/22 0557 05/04/22 0217   * 32  --    * 26  --    ALKPHOS 185* 93  --    BILITOTAL 0.6 1.5*  --    ALBUMIN 1.8* 2.0*  --    INR  --  1.41* 1.36*     Pancreatic Enzymes  Recent Labs   Lab 05/08/22 0456   LIPASE 135     Coagulation Profile  Recent Labs   Lab 05/04/22 0557 05/04/22 0217   INR 1.41* 1.36*   PTT 39* 44*       IMAGING:  Recent Results (from the past 24 hour(s))   XR Chest Port 1 View    Narrative    Exam: XR CHEST PORT 1 VIEW, 5/8/2022 9:21 AM    Indication: Monitoring after CTs placed to water seal    Comparison: 5/7/2022    Findings:   Endotracheal tube tip at the upper thoracic trachea. Left upper cavity  PICC tip at the high right atrium. Stable bilateral chest tubes. The  cardiomediastinal silhouette is within normal limits. The pulmonary  vasculature is indistinct. Stable right greater than left perihilar  and basilar predominant mixed interstitial and streaky airspace  opacities small right and trace left pleural  effusions. No appreciable  pneumothorax.      Impression    Impression:   1. No appreciable pneumothorax with bilateral chest tubes in place.  2. Stable right greater than left perihilar and basilar predominant  mixed interstitial and streaky airspace opacities.  3. Small right and trace left pleural effusions.    RONA HOFF DO         SYSTEM ID:  [id number]    Upper Extremity Venous Duplex Bilat    Addendum: 5/8/2022    Findings discussed with Dr. Mabel Villasenor via telephone by Dr. Valenzuela at  11:03 am.     I have personally reviewed the examination and initial interpretation  and I agree with the findings.    RONA HOFF DO         SYSTEM ID:  [id number]      Addendum: 5/8/2022    Findings discussed with Dr. Mabel Villasenor via telephone by Dr. Valenzuela at  11:03 am.       Narrative    EXAMINATION: DOPPLER VENOUS ULTRASOUND OF BILATERAL UPPER EXTREMITIES,  5/8/2022 10:13 AM     COMPARISON: 5/6/2022    HISTORY: Correlation with CT chest 5/6/2022    TECHNIQUE:  Gray-scale evaluation with compression, spectral flow, and  color Doppler assessment of the deep venous system of both upper  extremities.    FINDINGS:  Echogenic thrombus within the left internal jugular vein without  compressibility or flow on Doppler images. Left innominate and central  subclavian vein not visualized due to the presence of overlying  bandage. Left subclavian vein peripherally and left axillary vein are  patent with normal flow.    Normal blood flow and waveforms are demonstrated in the right internal  jugular, innominate, subclavian, and axillary veins.       Impression    IMPRESSION:  1. Occlusive DVT in the left IJ. Central left subclavian vein and  innominate vein are not visualized due to the presence of overlying  bandage.  2. No DVT on the right.    Findings were discussed with Dr. Batista by Dr. Valenzuela via telephone  at     I have personally reviewed the examination and initial interpretation  and I agree with the findings.    RONA  GIANCARLO HOFF DO         SYSTEM ID:  [id number]   XR Chest Port 1 View    Narrative    Exam: XR CHEST PORT 1 VIEW, 5/8/2022 11:23 AM    Indication: advancement of ETT, eval reposition.    Comparison: Same-day    Findings:   Endotracheal tube tip at the mid thoracic trachea. Left upper  extremity PICC tip at the superior cavoatrial junction. Stable  bilateral chest tubes. The cardiomediastinal silhouette and pulmonary  vasculature are within normal limits. Small right and trace left  pleural effusions. Stable bilateral mixed interstitial and patchy  airspace opacities. No appreciable pneumothorax.      Impression    Impression:   1. Endotracheal tube tip at the mid thoracic trachea.  2. No other significant change.    RONA HOFF DO         SYSTEM ID:  [id number]   XR Chest Port 1 View    Narrative    Exam: XR CHEST PORT 1 VIEW, 5/8/2022 1:53 PM    Indication: RE-assess ETT tube location , s/p advancement    Comparison: Same-day    Findings:   Endotracheal tube tip slightly advanced at the mid thoracic trachea.  Stable left chest tube. Right chest tube removed. The  cardiomediastinal silhouette is within normal limits. The pulmonary  vasculature is prominent and indistinct. No significant change in  small bilateral pleural effusions and bilateral mixed interstitial and  patchy/streaky airspace opacities. No appreciable pneumothorax.      Impression    Impression:   1. Endotracheal tube tip at the mid thoracic trachea.  2. No pneumothorax following right chest tube removal.  3. Stable bilateral pleural effusions and mixed interstitial/streaky  airspace opacities.    RONA HOFF DO         SYSTEM ID:  [id number]

## 2022-05-09 NOTE — PROGRESS NOTES
05/09/22 1400   Quick Adds   Type of Visit Initial Occupational Therapy Evaluation   Living Environment   People in Home alone   Current Living Arrangements apartment   Home Accessibility no concerns   Self-Care   Usual Activity Tolerance good   Current Activity Tolerance good   Equipment Currently Used at Home none   Fall history within last six months no   Activity/Exercise/Self-Care Comment Pt is IND with ADLs/IADLs at baseline. Pt reports he is not currently working and does not drive.   General Information   Onset of Illness/Injury or Date of Surgery 05/04/22   Referring Physician Lori Gerard   Patient/Family Therapy Goal Statement (OT) none stated   Additional Occupational Profile Info/Pertinent History of Current Problem Robert Gallo is a 26 year old male who was admitted on 5/4/2022 with necrotizing soft tissue infection of the neck and mediastinum now POD 4 s/p BILATERAL VIDEO ASSISTED THORACOTOMY. RIGHT ANTERIOR MEDIASTINOTYOMY. PEG TUBE. INCISION AND DRAINAGE PARAPHARYNGEAL ABCESS ON RIGHT AND LEFT, remaining intubated and sedated per ENT primary, pending resolution of infection and closure of neck wound.   Existing Precautions/Restrictions   (B thoracotomy precautions)   Cognitive Status Examination   Orientation Status orientation to person, place and time   Follows Commands WNL   Visual Perception   Impact of Vision Impairment on Function (Vision) Pt reports increased blurred vision since admission. Will continue to monitor.   Pain Assessment   Patient Currently in Pain Yes, see Vital Sign flowsheet   Range of Motion Comprehensive   General Range of Motion no range of motion deficits identified   Strength Comprehensive (MMT)   Comment, General Manual Muscle Testing (MMT) Assessment generalized deconditioning   Bed Mobility   Comment (Bed Mobility) supine > sit min A   Transfers   Transfer Comments CGA x2 for sit <> stands and transfer to recliner chair   Clinical Impression   Criteria for  Skilled Therapeutic Interventions Met (OT) Yes, treatment indicated   OT Diagnosis decreased ADL I   OT Problem List-Impairments impacting ADL activity tolerance impaired;pain;post-surgical precautions;strength   Assessment of Occupational Performance 3-5 Performance Deficits   Identified Performance Deficits dressing, bathing, toileting, home management   Planned Therapy Interventions (OT) ADL retraining;IADL retraining;bed mobility training;strengthening;transfer training;home program guidelines   Clinical Decision Making Complexity (OT) low complexity   Risk & Benefits of therapy have been explained evaluation/treatment results reviewed   Clinical Impression Comments Pt presents below baseline in ADLs. Recommend continued OT intervention to facilitate return to PLOF. Anticipate pt will progress quickly given age and PLOF.   OT Discharge Planning   OT Discharge Recommendation (DC Rec) home with assist   OT Rationale for DC Rec Pt presents below baseline in ADLs. Recommend continued OT intervention to facilitate return to PLOF. Anticipate pt will progress quickly given age and PLOF.   OT Brief overview of current status Ax1   Total Evaluation Time (Minutes)   Total Evaluation Time (Minutes) 5

## 2022-05-09 NOTE — PLAN OF CARE
Status  D/I: Patient on unit 4A Surgical/Neuro ICU   Neuro- alert, oriented; moves all extremities, good strength throughout  Pain/Sedation- pain with dressing changes, precedex and fentanyl stopped prior to extubation; PRN's available for future dressing changes  CV- SR; BP stable; tmax 99.7  Pulm- 2L nasal cannula post extubation at 1600; lungs diminished  GI/- voiding spontaneously, uses bedside urinal; x1 liquid BM, bowel meds held  Skin- old chest tube sites, neck incision--packed by ENT bedside BID; penrose drain; abscess's in right and left side of mouth  Gtts- TKO  ID- fluconazole added today  Labs- potassium replaced this morning  Activity- up with 1-2 assist while intubated for line management    PLAN- restart tube feeds this evening at goal, monitor respiratory status; ENT to do second dressing change bedside this evening   See flow sheets for further interventions and assessments.  P: Continue to monitor pt closely, Notify MD of changes/concerns.      ENT MD bedside this afternoon for partial abscess exploration bedside; looked at right and left side of mouth of oral abscesses. Patient's fentanyl drip was increased at this time to 200 mcg/hr, also gave  mcg of fentanyl total, 30 mg of ketamine total, and 2 mg of versed total for manipulation. Patient remained in pain but tolerated well.     Patient slightly more tachycardia this evening into the one-teens, slightly tachypneic intermittently as well. Patient does not complain of shortness of breath and says he feels okay; MD called and notified.

## 2022-05-09 NOTE — PROGRESS NOTES
Otolaryngology Attending    CT scan performed this am. Dependent fluid seen in both parapharyngeal spaces. This is in communication with the areas that have already been opened.    I opened up both spaces today, I was able to finger dissect both entire pockets. On the right I was able to evacuate 0.5 mls of purulence, on the left I was able to express and suction dishwater fluid. Dishwater came from the submental penrose probing.    I am comfortable and confident that I opened up and evacuated the areas on the CT scan. I also think he can protect his airway, he does not have excessive swelling and that if he has a very nice leak and meets parameters, we could work towards extubation.    He will continue to require oral examinations and neck packing and will need comfort meds for this.

## 2022-05-09 NOTE — PROGRESS NOTES
General ID [facility]: Follow-up Note      Patient:  Robert Gallo, Date of birth 1995, Medical record number [id number]  Date of Visit:  May 9, 2022  Reason for consult: necrotizing neck infection         Assessment and Recommendations:     ID Problem list:  1. Necrotizing soft tissue infection of neck and mediastinum  2. S/p neck and mediastinum debridement 5/4/22 by ENT and thoracic surgery  3. Polymicrobial infection with prelim Strep anginosus, Staph epidermidis, Prevotella, Carlyn dubliniensis  4. Fevers  5. Dental caries, s/p tooth extractions 5/4    Recs:  1. Agree with IV unasyn and addition of fluconazole at this time  2. Follow up pending OR cultures to further guide antibiotics  3. Source control as per ENT    Discussion:  Robert is a 26M with PMH including dental caries who was admitted 5/4 due to necrotizing soft tissue infection of the neck and mediastinum, now s/p debridement by ENT and thoracic surgery 5/4 and s/p VATS/chest tubes and PEG and also extraction of multiple teeth. His OR cultures have grown polymicrobial bacteria thus far including Strep anginosus, Staph epidermidis, and Prevotella, and yeast. He has been on IV zosyn over the weekend which was narrowed to IV unasyn today based on preliminary cultures. He was last febrile yesterday and afebrile thus far today, with WBC and CRP slowly down-trending. Given the yeast also now growing in the OR cultures, agree with addition of fluconazole at this time. Given ongoing fluid collections on CT, agree with source control as per ENT. Will follow up pending OR cultures/sensitivites to further guide antibiotics.          Recs discussed with primary team. Thank you for allowing us to participate in the care of this patient. ID will continue to follow.    Attestation:  Drake Clayton MD  Infectious Diseases     05/09/2022            Interval History:     Patient undergoing repeat CT today with subsequent possible bedside debridement  by ENT. Was also noted to be febile to 103*F yesterday and now with Candida growing in abscess fluid cultures. Remains intubated though on minimal vent settings, and is awake/interactive.          Review of Systems:   Unable to obtain full ROS due to patient status; otherwise as per above.          Current Antimicrobials   IV unasyn         Physical Exam:   Ranges for vital signs:  Temp:  [97.5  F (36.4  C)-101  F (38.3  C)] 99.8  F (37.7  C)  Pulse:  [] 79  Resp:  [20-28] 21  BP: (109-143)/(53-79) 112/55  FiO2 (%):  [30 %] 30 %  SpO2:  [94 %-100 %] 94 %    Intake/Output Summary (Last 24 hours) at 5/9/2022 1547  Last data filed at 5/9/2022 1500  Gross per 24 hour   Intake 3080.4 ml   Output 3250 ml   Net -169.6 ml     Exam:  GENERAL:  well-developed, well-nourished, sitting in bed in no acute distress.   ENT/NECK:  Head is normocephalic, atraumatic. Oropharynx is moist with ET tube in place. +surgical wounds down left neck to upper chest with serosanguinous fluid on dressings.   EYES:  Eyes have anicteric sclerae.   LUNGS:  Intubated with good air movement bilaterally.  CARDIOVASCULAR:  Regular rate and rhythm.  ABDOMEN:  Non-distended, soft, nontender.  EXT: Extremities warm and well perfused.   SKIN:  No acute rashes on extremities.   NEUROLOGIC:  Awake, intubated, nodding to questions.         Laboratory Data:       Inflammatory Markers    Recent Labs   Lab Test 05/09/22  0730 05/08/22  0456 05/07/22  0454 05/06/22  0344 05/05/22  0353 05/04/22  0804 05/04/22  0557   SED 96* 101* 113* 119* 118* 80* 69*   .0* 140.0* 160.0* 280.0* 440.0* 350.0* 330.0*       Hematology Studies    Recent Labs   Lab Test 05/09/22  0730 05/08/22  0456 05/07/22  0454 05/06/22  0344 05/05/22  0353 05/04/22  0557   WBC 9.3 9.7 11.1* 9.7 7.6 12.4*   HGB 9.7* 9.6* 9.4* 8.0* 8.1* 10.0*   MCV 95 94 94 93 92 92    340 317 210 192 241       Metabolic Studies     Recent Labs   Lab Test 05/09/22  0730 05/08/22  1944  05/08/22  1116 05/08/22  0456 05/07/22  0454 05/06/22  0344 05/05/22  0353   *  --   --  145* 147* 145* 142   POTASSIUM 3.8  3.8 3.3* 3.6 3.4 3.5 3.5 4.0   CHLORIDE 110*  --   --  114* 115* 112* 111*   CO2 30  --   --  26 28 27 27   BUN 22  --   --  16 22 25 23   CR 0.78  --   --  0.66 0.76 0.82 0.82   GFRESTIMATED >90  --   --  >90 >90 >90 >90       Hepatic Studies    Recent Labs   Lab Test 05/09/22  0730 05/08/22  0456 05/04/22  0557   BILITOTAL 0.6 0.6 1.5*   ALKPHOS 164* 185* 93   ALBUMIN 1.9* 1.8* 2.0*   AST 75* 120* 32   * 126* 26       Microbiology:  Culture   Date Value Ref Range Status   05/08/2022 No growth after 1 day  Preliminary   05/08/2022 No growth after 1 day  Preliminary   05/06/2022 No anaerobic organisms isolated after 2 days  Preliminary   05/06/2022 1+ Normal roslyn  Final   05/06/2022 4+ Carlyn dubliniensis (A)  Final     Comment:     Susceptibilities not routinely done   05/06/2022 3+ Carlyn dubliniensis (A)  Preliminary   05/06/2022 1+ Carlyn dubliniensis (A)  Final     Comment:     Susceptibilities not routinely done   05/04/2022 No Growth  Final   05/04/2022 No Growth  Final   05/04/2022 1+ Prevotella species (A)  Preliminary     Comment:     Susceptibilities not routinely done   05/04/2022 2+ Mixed Aerobic and Anaerobic roslyn  Preliminary   05/04/2022 Yeast (A)  Preliminary   05/04/2022 1+ Streptococcus anginosus (A)  Final     Comment:     This organism is susceptible to ampicillin, penicillin, vancomycin and the cephalosporins. If treatment is required and your patient is allergic to penicillin, contact the microbiology lab within 5 days to request susceptibility testing.   05/04/2022 1+ Normal roslyn  Final   05/04/2022 2+ Prevotella species (A)  Final     Comment:     Susceptibilities not routinely done   05/04/2022 2+ Prevotella species (A)  Final     Comment:     Susceptibilities not routinely done   05/04/2022 4+ Mixed Aerobic and Anaerobic roslyn  Final   05/04/2022  No growth after 5 days  Preliminary   2022 1+ Streptococcus anginosus (A)  Preliminary     Comment:     This organism is susceptible to ampicillin, penicillin, vancomycin and the cephalosporins. If treatment is required and your patient is allergic to penicillin, contact the microbiology lab within 5 days to request susceptibility testing.   2022 1+ Staphylococcus epidermidis (A)  Corrected              Imagin/6/22 CT read:  IMPRESSION:  1.  CT of the head demonstrates no acute cranial abnormality. No  abnormal intracranial contrast enhancing lesions.   2.  CT of the neck shows postsurgical changes of anterior median  sternotomy and bilateral incision and drainage of cervical  parapharyngeal spaces for necrotizing infection.  Large open wound  extends over the anterior cervical soft tissues with diffuse  heterogeneous edema of multiple neck spaces as described in great  detail above sparing the danger space. No definite residual  rim-enhancing collection however, the edematous collections could  represent developing phlegmon.   3.  No definite flow seen in the left mid and inferior internal  jugular vein, compatible with thrombus. 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  4.  Diffuse dental caries, left maxillary periapical dental disease  and recent left mandibular dental extractions.    22 CT read:  Impression:  1.  Postsurgical changes of neck wound washout with large anterior  open neck wound; new left sublingual space washout with drain place.   2.  Interval changes of consolidating phlegmons and evolving abscesses  throughout the neck involving the sublingual, ,  submandibular, and bilateral parapharyngeal spaces, and  retrosternal/retromanubrial region.  3.  Increased conspicuity of rim enhancing fluid collection along the  left upper thoracic esophagus, possibly representing paraesophageal  versus mucosal abscess. Cannot definitely exclude upper  esophageal  injury.  4.  Upper lung patchy dependent consolidative and groundglass airspace  opacities; atelectasis versus infection.  5.  Loculated bilateral pleural effusions.

## 2022-05-09 NOTE — PROGRESS NOTES
"Thoracic Surgery  Progress Note  May 9, 2022      S: Patient bradycardic overnight, otherwise NAEO. Patient slightly sedated this morning but opens eyes when addressed.    O:  /57   Pulse 54   Temp 97.5  F (36.4  C) (Axillary)   Resp 20   Ht 1.9 m (6' 2.8\")   Wt 88 kg (194 lb 0.1 oz)   SpO2 100%   BMI 24.38 kg/m      Physical Exam:  Gen: NAD, intubated, laying in bed  Pulm: on vent, no dyssynchrony  CV: NSR on monitor  Extr: wwp, no edema  Wound: large neck wound and chest wound w/no active drainage  Dressings: chest tube dressing on L c/d/i, chest tube dressing reinforced on R    I&O:  I/O last 3 completed shifts:  In: 3374.1 [I.V.:1094.1; NG/GT:720]  Out: 3775 [Urine:3725; Chest Tube:50]      Labs and imaging:  Labs pending.    Exam: XR CHEST PORT 1 VIEW, 5/8/2022 10:31 PM     Indication: Post-2nd chest tube removal.     Comparison: 5/8/2022 at 1340 hours     Findings:   Endotracheal tube tip at the mid thoracic trachea. Left chest tube is  been removed.  The cardiomediastinal silhouette is within normal  limits. The pulmonary vasculature is prominent and indistinct. No  significant change in small bilateral pleural effusions.  Stable to  decreased bilateral mixed interstitial and patchy/streaky airspace  opacities. No appreciable pneumothorax.                                                                      Impression:   1. Endotracheal tube tip at the mid thoracic trachea.  2. No pneumothorax following left chest tube removal.  3. Stable to decreased bilateral pleural effusions and mixed  interstitial/streaky airspace opacities.      A: Robert Gallo is a 26 year old with no significant PMHx who was admitted from [facility] with necrotizing fasciitis of neck with mediastinal involvement s/p emergent bilateral VATS, right anterior mediastinotomy, I&D of bilateral pharyngeal abscess, and PEG tube placement 5/4 with Thoracic and ENT. He remains intubated today. Both chest tubes removed yesterday and " post-removal CXR shows no PTX. Thoracic Surgery will sign off at this time. Please call or page with any questions.      Patient seen and discussed with fellow who will discuss with staff.     Angie Villasenor MD  PGY-1, General Surgery

## 2022-05-09 NOTE — PROGRESS NOTES
"ENT Progress Note    S: Failed pressure support x2. Chest tubes removed.  Precedex and fent gtt but interactive during visit. Febrile over the weekend Tm 103 with no fevers since receiving Tylenol on 5/8.     O  /60 (BP Location: Right arm)   Pulse 60   Temp 97.5  F (36.4  C) (Axillary)   Resp 20   Ht 1.9 m (6' 2.8\")   Wt 88 kg (194 lb 0.1 oz)   SpO2 99%   BMI 24.38 kg/m     Tm 103, Tc 97.5, MAPS 70s-80s, HR 50s-100s  GENERAL Intubated, responsive  HEENT left parapharyngeal space loosly approximated, no purulence, penrose able to irrigate betadine into mouth. The neck looks improved, granulation tissue forming on strap muscles, no pus, neck loosely packed with kerlix, lateral aspects of neck incision secured with sutures. Residual dentition poor.       Tissue cx - strep anginosus (neck)  Wound cx - prevotella (oral cav)  5/6 mouth cx - 4+ yeast  5/6 endotracheal, sputum cx- Candida dubliniensis    CXR 5/8  1. No appreciable pneumothorax. Bilateral chest is in place, right  chest tube retracted compared to prior x-ray on 5/4/2022   2. Small right pleural effusion and right basilar opacities which may  represent atelectasis versus infection.  3. Endotracheal tube tip projects 8 cm above the odilia. Consider  slight advancement  4. Left upper PICC tip projects over the right atrium. Consider  Repositioning.    NECK US 5/8 - thrombus left internal jugular  CT neck w IV - 5/9 ordered    Assessment and Plan  Robert Gallo is a 26 year old male with no significant past medical history with necrotizing fasciitis of the neck with mediastinal involvement s/p I&D of bilateral parapharyngeal spaces, sublingual space, bilateral necks, and extraction of teeth #19 and 20 and bilateral VATS and PEG tube placement by Thoracic Surgery on 5/4/2022.  Neuro:  -Pain control and sedation per SICU  HEENT:  - Oral surgery: no further extractions currently, appreciate input  - Twice daily packing changes to the neck with Dakins " soaked Kerlix to be performed by ENT  Respiratory:  - Intubated, mechanically ventilated  - hold on extubation until after CT results  CV/heme:  - hemodynamically stable  FEN/GI:  - Status post PEG tube placement by Thoracic Surgery, ermoved 5/8  :  - Richard in place  Endo  -ISS per sicu  Heme/ID:  - ID -  zosyn  -Trend CBC, CRP, and ESR  - Follow-up OR cultures, new cultures obtained 5/6/2022 - susceptibilities added  - Discuss new Carlyn dubliniensis cx with ID- appreciate recs  - therapeutic lovenox  - CT neck w/ IV 5/9    PPX:  - lovenox as above  - SCDs    --Patient and plan to be discussed with Dr. Bao Dasilva PATrinidadC  Otolaryngology-Head & Neck Surgery  Please contact ENT by dialing [phone] and entering job code [phone].

## 2022-05-10 ENCOUNTER — APPOINTMENT (OUTPATIENT)
Dept: OCCUPATIONAL THERAPY | Facility: CLINIC | Age: 27
End: 2022-05-10
Payer: COMMERCIAL

## 2022-05-10 LAB
ANION GAP SERPL CALCULATED.3IONS-SCNC: 7 MMOL/L (ref 3–14)
ATRIAL RATE - MUSE: 123 BPM
ATRIAL RATE - MUSE: 87 BPM
BUN SERPL-MCNC: 18 MG/DL (ref 7–30)
CALCIUM SERPL-MCNC: 7.8 MG/DL (ref 8.5–10.1)
CHLORIDE BLD-SCNC: 109 MMOL/L (ref 94–109)
CO2 SERPL-SCNC: 26 MMOL/L (ref 20–32)
CREAT SERPL-MCNC: 0.73 MG/DL (ref 0.66–1.25)
CRP SERPL-MCNC: 150 MG/L (ref 0–8)
DIASTOLIC BLOOD PRESSURE - MUSE: NORMAL MMHG
DIASTOLIC BLOOD PRESSURE - MUSE: NORMAL MMHG
ERYTHROCYTE [DISTWIDTH] IN BLOOD BY AUTOMATED COUNT: 14.5 % (ref 10–15)
ERYTHROCYTE [SEDIMENTATION RATE] IN BLOOD BY WESTERGREN METHOD: 96 MM/HR (ref 0–15)
GFR SERPL CREATININE-BSD FRML MDRD: >90 ML/MIN/1.73M2
GLUCOSE BLD-MCNC: 159 MG/DL (ref 70–99)
GLUCOSE BLDC GLUCOMTR-MCNC: 128 MG/DL (ref 70–99)
GLUCOSE BLDC GLUCOMTR-MCNC: 136 MG/DL (ref 70–99)
GLUCOSE BLDC GLUCOMTR-MCNC: 138 MG/DL (ref 70–99)
GLUCOSE BLDC GLUCOMTR-MCNC: 139 MG/DL (ref 70–99)
GLUCOSE BLDC GLUCOMTR-MCNC: 143 MG/DL (ref 70–99)
GLUCOSE BLDC GLUCOMTR-MCNC: 157 MG/DL (ref 70–99)
GLUCOSE BLDC GLUCOMTR-MCNC: 162 MG/DL (ref 70–99)
HCT VFR BLD AUTO: 32.1 % (ref 40–53)
HGB BLD-MCNC: 10.2 G/DL (ref 13.3–17.7)
INTERPRETATION ECG - MUSE: NORMAL
INTERPRETATION ECG - MUSE: NORMAL
LACTATE SERPL-SCNC: 1.2 MMOL/L (ref 0.7–2)
MAGNESIUM SERPL-MCNC: 2.1 MG/DL (ref 1.6–2.3)
MCH RBC QN AUTO: 30.2 PG (ref 26.5–33)
MCHC RBC AUTO-ENTMCNC: 31.8 G/DL (ref 31.5–36.5)
MCV RBC AUTO: 95 FL (ref 78–100)
P AXIS - MUSE: 34 DEGREES
P AXIS - MUSE: 43 DEGREES
PHOSPHATE SERPL-MCNC: 2.7 MG/DL (ref 2.5–4.5)
PLATELET # BLD AUTO: 328 10E3/UL (ref 150–450)
POTASSIUM BLD-SCNC: 3.4 MMOL/L (ref 3.4–5.3)
PR INTERVAL - MUSE: 114 MS
PR INTERVAL - MUSE: 114 MS
QRS DURATION - MUSE: 76 MS
QRS DURATION - MUSE: 82 MS
QT - MUSE: 314 MS
QT - MUSE: 382 MS
QTC - MUSE: 449 MS
QTC - MUSE: 459 MS
R AXIS - MUSE: 25 DEGREES
R AXIS - MUSE: 39 DEGREES
RBC # BLD AUTO: 3.38 10E6/UL (ref 4.4–5.9)
SODIUM SERPL-SCNC: 142 MMOL/L (ref 133–144)
SYSTOLIC BLOOD PRESSURE - MUSE: NORMAL MMHG
SYSTOLIC BLOOD PRESSURE - MUSE: NORMAL MMHG
T AXIS - MUSE: 21 DEGREES
T AXIS - MUSE: 27 DEGREES
VENTRICULAR RATE- MUSE: 123 BPM
VENTRICULAR RATE- MUSE: 87 BPM
WBC # BLD AUTO: 8.3 10E3/UL (ref 4–11)

## 2022-05-10 PROCEDURE — 83735 ASSAY OF MAGNESIUM: CPT

## 2022-05-10 PROCEDURE — 250N000013 HC RX MED GY IP 250 OP 250 PS 637: Performed by: STUDENT IN AN ORGANIZED HEALTH CARE EDUCATION/TRAINING PROGRAM

## 2022-05-10 PROCEDURE — 200N000002 HC R&B ICU UMMC

## 2022-05-10 PROCEDURE — 250N000011 HC RX IP 250 OP 636: Performed by: PHYSICIAN ASSISTANT

## 2022-05-10 PROCEDURE — 86140 C-REACTIVE PROTEIN: CPT

## 2022-05-10 PROCEDURE — 250N000011 HC RX IP 250 OP 636

## 2022-05-10 PROCEDURE — 97110 THERAPEUTIC EXERCISES: CPT | Mod: GO

## 2022-05-10 PROCEDURE — 85014 HEMATOCRIT: CPT

## 2022-05-10 PROCEDURE — 97530 THERAPEUTIC ACTIVITIES: CPT | Mod: GO

## 2022-05-10 PROCEDURE — 99233 SBSQ HOSP IP/OBS HIGH 50: CPT | Performed by: INTERNAL MEDICINE

## 2022-05-10 PROCEDURE — 83605 ASSAY OF LACTIC ACID: CPT | Performed by: STUDENT IN AN ORGANIZED HEALTH CARE EDUCATION/TRAINING PROGRAM

## 2022-05-10 PROCEDURE — 250N000013 HC RX MED GY IP 250 OP 250 PS 637

## 2022-05-10 PROCEDURE — 80048 BASIC METABOLIC PNL TOTAL CA: CPT

## 2022-05-10 PROCEDURE — 84100 ASSAY OF PHOSPHORUS: CPT

## 2022-05-10 PROCEDURE — 85652 RBC SED RATE AUTOMATED: CPT

## 2022-05-10 PROCEDURE — 36415 COLL VENOUS BLD VENIPUNCTURE: CPT

## 2022-05-10 PROCEDURE — 250N000013 HC RX MED GY IP 250 OP 250 PS 637: Performed by: OTOLARYNGOLOGY

## 2022-05-10 PROCEDURE — 99233 SBSQ HOSP IP/OBS HIGH 50: CPT | Mod: GC | Performed by: SURGERY

## 2022-05-10 PROCEDURE — 250N000011 HC RX IP 250 OP 636: Performed by: STUDENT IN AN ORGANIZED HEALTH CARE EDUCATION/TRAINING PROGRAM

## 2022-05-10 RX ORDER — HYDROMORPHONE HYDROCHLORIDE 1 MG/ML
.5-1 INJECTION, SOLUTION INTRAMUSCULAR; INTRAVENOUS; SUBCUTANEOUS
Status: DISCONTINUED | OUTPATIENT
Start: 2022-05-10 | End: 2022-05-25

## 2022-05-10 RX ORDER — POTASSIUM CHLORIDE 1.5 G/1.58G
40 POWDER, FOR SOLUTION ORAL ONCE
Status: COMPLETED | OUTPATIENT
Start: 2022-05-10 | End: 2022-05-10

## 2022-05-10 RX ORDER — PIPERACILLIN SODIUM, TAZOBACTAM SODIUM 3; .375 G/15ML; G/15ML
3.38 INJECTION, POWDER, LYOPHILIZED, FOR SOLUTION INTRAVENOUS EVERY 6 HOURS
Status: DISCONTINUED | OUTPATIENT
Start: 2022-05-10 | End: 2022-05-15

## 2022-05-10 RX ORDER — POLYETHYLENE GLYCOL 3350 17 G/17G
17 POWDER, FOR SOLUTION ORAL DAILY PRN
Status: DISCONTINUED | OUTPATIENT
Start: 2022-05-10 | End: 2022-05-26 | Stop reason: HOSPADM

## 2022-05-10 RX ADMIN — Medication 40 MG: at 08:09

## 2022-05-10 RX ADMIN — INSULIN ASPART 1 UNITS: 100 INJECTION, SOLUTION INTRAVENOUS; SUBCUTANEOUS at 12:06

## 2022-05-10 RX ADMIN — ONDANSETRON 4 MG: 4 TABLET, ORALLY DISINTEGRATING ORAL at 08:08

## 2022-05-10 RX ADMIN — AMPICILLIN SODIUM AND SULBACTAM SODIUM 3 G: 2; 1 INJECTION, POWDER, FOR SOLUTION INTRAMUSCULAR; INTRAVENOUS at 04:56

## 2022-05-10 RX ADMIN — PIPERACILLIN AND TAZOBACTAM 3.38 G: 3; .375 INJECTION, POWDER, FOR SOLUTION INTRAVENOUS at 21:16

## 2022-05-10 RX ADMIN — ACETAMINOPHEN 325MG 650 MG: 325 TABLET ORAL at 15:52

## 2022-05-10 RX ADMIN — POTASSIUM CHLORIDE 40 MEQ: 1.5 POWDER, FOR SOLUTION ORAL at 08:09

## 2022-05-10 RX ADMIN — FLUCONAZOLE 400 MG: 2 INJECTION INTRAVENOUS at 13:26

## 2022-05-10 RX ADMIN — ACETAMINOPHEN 325MG 650 MG: 325 TABLET ORAL at 23:12

## 2022-05-10 RX ADMIN — PIPERACILLIN AND TAZOBACTAM 3.38 G: 3; .375 INJECTION, POWDER, FOR SOLUTION INTRAVENOUS at 10:05

## 2022-05-10 RX ADMIN — ENOXAPARIN SODIUM 90 MG: 100 INJECTION SUBCUTANEOUS at 00:16

## 2022-05-10 RX ADMIN — HYDROMORPHONE HYDROCHLORIDE 0.5 MG: 1 INJECTION, SOLUTION INTRAMUSCULAR; INTRAVENOUS; SUBCUTANEOUS at 02:41

## 2022-05-10 RX ADMIN — HYOSCYAMINE SULFATE: 16 SOLUTION at 19:36

## 2022-05-10 RX ADMIN — HYOSCYAMINE SULFATE: 16 SOLUTION at 08:10

## 2022-05-10 RX ADMIN — Medication 2 PACKET: at 19:36

## 2022-05-10 RX ADMIN — Medication 6 MG: at 23:12

## 2022-05-10 RX ADMIN — Medication 2 PACKET: at 08:11

## 2022-05-10 RX ADMIN — ENOXAPARIN SODIUM 90 MG: 100 INJECTION SUBCUTANEOUS at 12:01

## 2022-05-10 RX ADMIN — INSULIN ASPART 1 UNITS: 100 INJECTION, SOLUTION INTRAVENOUS; SUBCUTANEOUS at 04:56

## 2022-05-10 RX ADMIN — ENOXAPARIN SODIUM 90 MG: 100 INJECTION SUBCUTANEOUS at 23:16

## 2022-05-10 RX ADMIN — PIPERACILLIN AND TAZOBACTAM 3.38 G: 3; .375 INJECTION, POWDER, FOR SOLUTION INTRAVENOUS at 15:41

## 2022-05-10 RX ADMIN — HYDROMORPHONE HYDROCHLORIDE 1 MG: 1 INJECTION, SOLUTION INTRAMUSCULAR; INTRAVENOUS; SUBCUTANEOUS at 16:44

## 2022-05-10 RX ADMIN — Medication 6 MG: at 02:40

## 2022-05-10 RX ADMIN — ACETAMINOPHEN 325MG 650 MG: 325 TABLET ORAL at 05:08

## 2022-05-10 RX ADMIN — DOXAZOSIN 2 MG: 2 TABLET ORAL at 18:17

## 2022-05-10 ASSESSMENT — ACTIVITIES OF DAILY LIVING (ADL)
ADLS_ACUITY_SCORE: 26
ADLS_ACUITY_SCORE: 22
ADLS_ACUITY_SCORE: 26
ADLS_ACUITY_SCORE: 22
ADLS_ACUITY_SCORE: 26

## 2022-05-10 NOTE — PLAN OF CARE
Major Shift Events:  Pt temp up to 103.3, down with tylenol and ice. Labs, xray, EKG, and cultures done. Tachy to 130 and down with temp. Tachypneic 20-30s. Neuros intact. Tolerating tube feeds although does have pain at PEG insertion site. 2 liquid BMs. Good uop with urinal. Minimal sleep overnight.     Plan: Monitor for infection.    For vital signs and complete assessments, please see documentation flowsheets.

## 2022-05-10 NOTE — PROGRESS NOTES
"ENT Progress Note    S: Extubated. Febrile to 103.3, tachycardic 100s-130s, RR 22-38. CT scan yesterday with evidence of continued infection pockets but on examination these are open to air, fluid likely represented irrigation fluid.     O  /63   Pulse 108   Temp (!) 101.6  F (38.7  C) (Axillary)   Resp (!) 32   Ht 1.9 m (6' 2.8\")   Wt 98.2 kg (216 lb 7.9 oz)   SpO2 97%   BMI 27.20 kg/m     Tm 103, Tc 97.5, MAPS 70s-80s, HR 50s-100s  GENERAL Intubated, responsive  HEENT left parapharyngeal space loosly approximated, no purulence, penrose with minimum purulence. The neck looks improved, granulation tissue forming on strap muscles, no pus, neck loosely packed with kerlix, lateral aspects of neck incision secured with sutures. Residual dentition poor. Dakins-soaked dressings changed.    LABS  WBC 8.3 (8.1)  Hg 10.2 (10.2)   (160)     Tissue cx - strep anginosus (neck)  Wound cx - prevotella (oral cav)  5/6 mouth cx - 4+ yeast  5/6 endotracheal, sputum cx- Candida dubliniensis    CXR 5/9  1. Removal of the patient's endotracheal tube.  2. Streaky bibasilar opacities may represent atelectasis or infection.  3. Small bilateral pleural effusions are also present.    CT Head/Neck 5/10  1.  Postsurgical changes of neck wound washout with large anterior open neck wound; new left sublingual space washout with drain place.   2.  Interval changes of consolidating phlegmons and evolving abscesses throughout the neck involving the sublingual, , submandibular, and bilateral parapharyngeal spaces, and retrosternal/retromanubrial region.  3.  Increased conspicuity of rim enhancing fluid collection along the left upper thoracic esophagus, possibly representing paraesophageal versus mucosal abscess. Cannot definitely exclude upper esophageal injury.  4.  Upper lung patchy dependent consolidative and groundglass airspace opacities; atelectasis versus infection.  5.  Loculated bilateral pleural " effusions.    NECK US 5/8 - thrombus left internal jugular  CT neck w IV 5/9 - some fluid pockets, no obvious residual abscesses    Assessment and Plan  Robert Gallo is a 26 year old male with no significant past medical history with necrotizing fasciitis of the neck with mediastinal involvement s/p I&D of bilateral parapharyngeal spaces, sublingual space, bilateral necks, and extraction of teeth #19 and 20 and bilateral VATS and PEG tube placement by Thoracic Surgery on 5/4/2022. Clinical status today concerning for persistent/worsening infection given fever, tachycardia. Infections of head and neck continue to appear improved, will discuss with SICU and ID possibility of other infection sites.     Neuro:  -Pain control and sedation per SICU  HEENT:  - Oral surgery: no further extractions currently, appreciate input  - Twice daily packing changes to the neck with Dakins soaked Kerlix to be performed by ENT  Respiratory:  - extubated  CV/heme:  - hemodynamically stable  FEN/GI:  - Status post PEG tube placement by Thoracic Surgery, ermoved 5/8  :  - Richard in place  Endo  -ISS per sicu  Heme/ID:  - ID -  Unasyn, fluconazole, recommend re-broadening to Zosyn  -Trend CBC, CRP, and ESR  - Follow-up OR cultures, new cultures obtained 5/6/2022 - susceptibilities added  - Discuss new Carlyn dubliniensis cx with ID- appreciate recs  - therapeutic lovenox  - CT neck w/ IV 5/9    PPX:  - lovenox as above  - SCDs     --Patient and plan discussed with Dr. Bao Blackburn MD  Otolaryngology-Head & Neck Surgery PGY1  Please contact ENT by dialing * [phone] and entering job code [phone].

## 2022-05-10 NOTE — PROVIDER NOTIFICATION
Brief progress note:    Patient has documented RR up to 40. This was discussed with bedside nurse who stated readings on monitor were inaccurate. Patient is comfortable, breathing 18-20 bpm at bedside. Using incentive spirometry without difficulty. Endorses mild pain.    Mica Olmedo MD  PGY-1

## 2022-05-10 NOTE — PROGRESS NOTES
Was asked to follow-up on patients CT scan of neck. He was febrile and tachycardic but has since been afebrile this morning. There is a small collection of fluid next to the left esophagus that my be within the wall but may also be tracking from above in the neck. At this time, would not do any further intervention, continue with abx. If continues to have problems or worsens clinically could repeat imaging at that time. Please call with questions.    Signed:    Conrado Castro MD 5/10/2022 at 9:44 AM  Cardiothoracic Surgery Fellow  Pager: [phone]

## 2022-05-10 NOTE — PROGRESS NOTES
General [facility] Service: Follow-up Note      Patient:  Robert Gallo, Date of birth 1995, Medical record number [id number]  Date of Visit:  May 9, 2022  Reason for consult: necrotizing neck infection         Assessment and Recommendations:     ID Problem list:  1. Necrotizing soft tissue infection of neck and mediastinum  2. S/p neck and mediastinum debridement 5/4/22 by ENT and thoracic surgery  3. Polymicrobial infection with prelim Strep anginosus, Staph epidermidis, Prevotella, Carlyn dubliniensis  4. Fevers  5. Dental caries, s/p tooth extractions 5/4    Recs:  1. Recommend Unasyn and fluconazole for isolated organisms. Team prefers Zosyn.  2. Consider para-esophageal fluid collection as possible undrained source of ongoing fevers  3. Will need connection to substance abuse services prior to discharge    Discussion:  Robert is a 26M with PMH including dental caries who was admitted 5/4 due to necrotizing soft tissue infection of the neck and mediastinum, now s/p debridement by ENT and thoracic surgery 5/4 and s/p VATS/chest tubes and PEG and also extraction of multiple teeth. His OR cultures have grown polymicrobial bacteria thus far including Strep anginosus, Staph epidermidis, and Prevotella, and yeast. He has been on IV zosyn over the weekend which was narrowed to IV unasyn, then changed back to zosyn at ENTs request. Fluconazole was added on 5/9. Still febrile but CRP and WBC stable. Unclear ongoing cause of fever but suspect the 1.3 x 1.3 x 4.8 paraesophageal abscess as the cause and awaiting ENT and thoracic surgeries input.     Additional information obtained today without family present. He reports smoking methamphetamine regularly for last several years likely the underlying cause of his dental issues. Will need to connect to services before discharge. Family is unaware and he shared this information in confidence.    Recs discussed with primary team. Thank you for allowing us to  participate in the care of this patient. ID will continue to follow.    Discussed with Dr. Clayton.    Yg Douglas MD  Infectious Disease Fellow           Interval History:     Nursing notes reviewed. Continues to have fevers but now extubated and feels like he is improving. Denies new pain, just around the surgical sites. Did admit to regularly smoking meth. No other complaints.         Review of Systems:     8 point ROS negative unless noted above         Current Antimicrobials   IV Zosyn  Fluconazole         Physical Exam:   Ranges for vital signs:  Temp:  [99.4  F (37.4  C)-103.3  F (39.6  C)] 100.4  F (38  C)  Pulse:  [] 104  Resp:  [18-40] 40  BP: (102-144)/(53-84) 142/72  SpO2:  [92 %-100 %] 94 %    Intake/Output Summary (Last 24 hours) at 5/9/2022 1547  Last data filed at 5/9/2022 1500  Gross per 24 hour   Intake 3080.4 ml   Output 3250 ml   Net -169.6 ml     Exam:  GENERAL:  well-developed, well-nourished, sitting in bed in no acute distress.   ENT/NECK:  Head is normocephalic, atraumatic. Extubated and communicative. +surgical wounds down left neck to upper chest with serosanguinous fluid on dressings.   EYES:  Eyes have anicteric sclerae.   LUNGS:  Breathing comfortably on room air. No crackles, rhonchi, or wheeze  CARDIOVASCULAR:  Regular rate and rhythm. No murmurs.   ABDOMEN:  Non-distended, soft, nontender.  EXT: Extremities warm and well perfused.  SKIN:  No acute rashes on extremities.   NEUROLOGIC:  Awake and orientated  LINES: central lines removed. Richard out. PIV without erythema or drainage         Laboratory Data:       Inflammatory Markers    Recent Labs   Lab Test 05/10/22  0557 05/09/22  0730 05/08/22  0456 05/07/22  0454 05/06/22  0344 05/05/22  0353 05/04/22  0804 05/04/22  0557   SED 96* 96* 101* 113* 119* 118* 80* 69*   .0* 160.0* 140.0* 160.0* 280.0* 440.0* 350.0* 330.0*       Hematology Studies    Recent Labs   Lab Test 05/10/22  0557 05/09/22  2210 05/09/22  0759  05/08/22  0456 05/07/22  0454 05/06/22  0344   WBC 8.3 8.1 9.3 9.7 11.1* 9.7   HGB 10.2* 10.2* 9.7* 9.6* 9.4* 8.0*   MCV 95 95 95 94 94 93    333 330 340 317 210       Metabolic Studies     Recent Labs   Lab Test 05/10/22  0557 05/09/22  2210 05/09/22  0730 05/08/22  1944 05/08/22  1116 05/08/22  0456 05/07/22  0454    144 145*  --   --  145* 147*   POTASSIUM 3.4 3.6 3.8  3.8 3.3* 3.6 3.4 3.5   CHLORIDE 109 111* 110*  --   --  114* 115*   CO2 26 27 30  --   --  26 28   BUN 18 21 22  --   --  16 22   CR 0.73 0.85 0.78  --   --  0.66 0.76   GFRESTIMATED >90 >90 >90  --   --  >90 >90       Hepatic Studies    Recent Labs   Lab Test 05/09/22  0730 05/08/22  0456 05/04/22  0557   BILITOTAL 0.6 0.6 1.5*   ALKPHOS 164* 185* 93   ALBUMIN 1.9* 1.8* 2.0*   AST 75* 120* 32   * 126* 26       Microbiology:  Culture   Date Value Ref Range Status   05/09/2022 No growth after 12 hours  Preliminary   05/09/2022 No growth after 12 hours  Preliminary   05/08/2022 No growth after 2 days  Preliminary   05/08/2022 No growth after 2 days  Preliminary   05/06/2022 No anaerobic organisms isolated after 2 days  Preliminary   05/06/2022 1+ Normal roslyn  Final   05/06/2022 4+ Carlyn dubliniensis (A)  Final     Comment:     Susceptibilities not routinely done   05/06/2022 3+ Carlyn dubliniensis (A)  Preliminary   05/06/2022 1+ Carlyn dubliniensis (A)  Final     Comment:     Susceptibilities not routinely done   05/04/2022 No Growth  Final   05/04/2022 No Growth  Final   05/04/2022 1+ Prevotella species (A)  Preliminary     Comment:     Susceptibilities not routinely done   05/04/2022 2+ Mixed Aerobic and Anaerobic roslyn  Preliminary   05/04/2022 Yeast (A)  Preliminary   05/04/2022 1+ Streptococcus anginosus (A)  Final     Comment:     This organism is susceptible to ampicillin, penicillin, vancomycin and the cephalosporins. If treatment is required and your patient is allergic to penicillin, contact the microbiology lab  within 5 days to request susceptibility testing.   2022 1+ Normal roslyn  Final   2022 2+ Prevotella species (A)  Final     Comment:     Susceptibilities not routinely done   2022 2+ Prevotella species (A)  Final     Comment:     Susceptibilities not routinely done   2022 4+ Mixed Aerobic and Anaerobic roslyn  Final   2022 No growth after 6 days  Preliminary   2022 1+ Streptococcus anginosus (A)  Preliminary     Comment:     This organism is susceptible to ampicillin, penicillin, vancomycin and the cephalosporins. If treatment is required and your patient is allergic to penicillin, contact the microbiology lab within 5 days to request susceptibility testing.   2022 1+ Staphylococcus epidermidis (A)  Corrected              Imagin/6/22 CT read:  IMPRESSION:  1.  CT of the head demonstrates no acute cranial abnormality. No  abnormal intracranial contrast enhancing lesions.   2.  CT of the neck shows postsurgical changes of anterior median  sternotomy and bilateral incision and drainage of cervical  parapharyngeal spaces for necrotizing infection.  Large open wound  extends over the anterior cervical soft tissues with diffuse  heterogeneous edema of multiple neck spaces as described in great  detail above sparing the danger space. No definite residual  rim-enhancing collection however, the edematous collections could  represent developing phlegmon.   3.  No definite flow seen in the left mid and inferior internal  jugular vein, compatible with thrombus. 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  4.  Diffuse dental caries, left maxillary periapical dental disease  and recent left mandibular dental extractions.    22 CT read:  Impression:  1.  Postsurgical changes of neck wound washout with large anterior  open neck wound; new left sublingual space washout with drain place.   2.  Interval changes of consolidating phlegmons and evolving  abscesses  throughout the neck involving the sublingual, ,  submandibular, and bilateral parapharyngeal spaces, and  retrosternal/retromanubrial region.  3.  Increased conspicuity of rim enhancing fluid collection along the  left upper thoracic esophagus, possibly representing paraesophageal  versus mucosal abscess. Cannot definitely exclude upper esophageal  injury.  4.  Upper lung patchy dependent consolidative and groundglass airspace  opacities; atelectasis versus infection.  5.  Loculated bilateral pleural effusions.

## 2022-05-10 NOTE — PROGRESS NOTES
SURGICAL ICU PROGRESS NOTE  05/10/2022      PRIMARY TEAM: ENT  PRIMARY PHYSICIAN: Dr. Milena Gore MD  REASON FOR CRITICAL CARE ADMISSION: respiratory and hemodynamic monitoring   Date of Service (when I saw the patient): 05/10/2022    ASSESSMENT:  Robert Gallo is a 26 year old male who was admitted on 5/4/2022 with necrotizing soft tissue infection of the neck and mediastinum now POD 6 s/p BILATERAL VIDEO ASSISTED THORACOTOMY. RIGHT ANTERIOR MEDIASTINOTYOMY. PEG TUBE. INCISION AND DRAINAGE PARAPHARYNGEAL ABCESS ON RIGHT AND LEFT, remaining intubated and sedated per ENT primary, pending resolution of infection and closure of neck wound.    PLAN:  Changes today:  - Attempt dressing changes with dilaudid (administer 10 min prior), fentanytl and ketamine available if pain is not managed   - Extubated yesterday, 1 L NC, wean as tolerated   - Restarted zosyn per ENT, dc'd unasyn  - Miralax PRN  - Discontinue PPI    Neurological:  # Acute pain   - Attempt dressing changes with dilaudid, however ketamine and fentanyl still available   - Sedation: Precedex and versed discontinued   - Sleep: Melatonin   - Monitor neurological status. Delirium preventions and precautions.     Pulmonary:   # Post operative ventilatory support  # Aspiration vs atelectasis  # Pulmonary Edema   - Extubated yesterday, 1 L NC, satting well   - CXR: small pleural effusions, concern for atelectasis vs infection in bases    - Chest physiotherapy and IS today  - Chest and neck packing to be changed BID by ENT     Cardiovascular:    # Shock-distributive   - MAP consistently >75, continue to monitor   - Not requiring pressors    Gastroenterology/Nutrition:  - Bowel regimen: senna BID and miralax PRN   - NPO, resumed tube feeds  - Diet: Osmolite 1.5 at goal 65mL/hr    Fluids/Electrolytes:   # Hypocalcemia  # Hypophosphatemia, resolved  # Hypokalemia, resolved  # Hypernatremia, resolved  - FWF 60 q4 hours    - Replete lytes PRN   - Continue to  monitor BMP daily     Renal:  - Urine output (0.4cc/kg/hr). Will continue to monitor intake and output.  - On doxazosin 2mg daily    Endocrine:  # Stress hyperglycemia   - Sliding scale for glucose management. Goal to keep BG< 180 for optimal wound healing   - HgbA1C 4.8%  - Completed stress dose steroids on 5/8    ID:  # Necrotizing soft tissue infection of the odontogenic facial region with mediastinal involvement   # Septic thrombophlebitis of the left internal jugular  # Fever   # Leukocytosis, resolved    # Septic Shock, resolved    CT Neck 5/9 w/ ring enhancing fluid near left upper thoracic esophagus concerning for abscess, can't rule out esophageal injury; ID remains concerned for abscess and source control  - Per thoracic surgery, no further intervention at this time, continue with abx  - Tmax 103.3, tachy 130s. Two febrile episodes overnight, correlated with nursing care. No leukocytosis, CRP downtrending. Per SICU, this is most concerning for atelectasis at this time given CXR and recent extubation. Will continue to monitor  - Restart zosyn per ENT and continue fluconazole; per ID, Unasyn would be sufficient, concern for abscess  - CRP, WBC, ESR stable/downtrending    - Lactate normal, transaminases downtrending     Cx:  - Blood cx (5/4,5/6) no growth   - Soft tissue cx positive (5/4) for Prevotella and Strep anginosus   - Respiratory cx (5/6): 1+ candida dubliniensis  - Oral abscess (5/6): 4+ yeast, 4+ candida dubliniensis      Heme:     # Acute blood loss anemia   # Elevated Fibrinogen   - Hgb 10.2, stable  - Transfuse if hgb <7.0 or signs/symptoms of hypoperfusion. Monitor and trend.   - Continue therapeutic lovenox 90 mg BID for L IJ  Thrombus, confirmed by ultrasound (5/8)    Musculoskeletal:  - Physical and occupational therapy consult   - Patient able to participate well with PT    Skin:  Routine SICU cares     General Cares/Prophylaxis:    DVT Prophylaxis: therapeutic lovenox, SCDs  GI  Prophylaxis: Discontinue PPI  Restraints: Restraints for medical healing needed: NO   Code Status: Full code     Lines/ tubes/ drains:  - Left PIV x2, PEG (5/4)    Disposition:  - Pending ability to tolerate dressing changes without sedation; may be appropriate for intermediate care    Patient seen, findings and plan discussed with surgical ICU staff, Dr. Travis.       Mica OZUNA MD, was present with the medical/NAGI student who participated in the service and in the documentation of the note.  I have verified the history and personally performed the physical exam and medical decision making.  I agree with the assessment and plan of care as documented in the note.         Date of Service (when I saw the patient): 05/09/22    Kit Bird, MS4    I agree with the documentation provided by the medical student above and was present for the history and physical exam. I made updates or corrections as appropriate.       - - - - - - - - - - - - - - - - - - - - - - - - - - - - - - - - - - - - - - - - - - - - - -   Subjective: Febrile to 103.3 and tachy to 130s overnight. Having more frequent and looser stools per nursing. Nausea this morning and given zofran. States that he is tired of being in bed.     REVIEW OF SYSTEMS: Otherwise negative     PHYSICAL EXAMINATION:  Temp:  [97.8  F (36.6  C)-103.3  F (39.6  C)] 101.6  F (38.7  C)  Pulse:  [] 108  Resp:  [18-35] 32  BP: (102-141)/(53-81) 127/63  FiO2 (%):  [30 %] 30 %  SpO2:  [92 %-100 %] 97 %  General: Resting comfortably in bed, no acute distress, interactive  HEENT: Neck packing in place  Neuro: Alert, following commands, moves all extremities spontaneously   Pulm/Resp: Breathing comfortably on room air, no accessory muscle use, lungs clear to auscultation bilaterally, no wheezes or crackles   CV: RRR, distal pulses palpable   Abdomen: Soft, non-distended, non-tender, no rebound tenderness or guarding, no masses, PEG   : No feldman  Incisions/Skin:  packed and clean   MSK/Extremities: No peripheral edema, moving all extremities, calves equal, extremities well perfused    LABS: Reviewed.   Arterial Blood Gases   Recent Labs   Lab 05/04/22 0918 05/04/22  0555 05/04/22  0210   PH 7.37 7.28* 7.39   PCO2 41 51* 38   PO2 175* 136* 444*   HCO3 24 24 23     Complete Blood Count   Recent Labs   Lab 05/09/22 2210 05/09/22 0730 05/08/22 0456 05/07/22 0454   WBC 8.1 9.3 9.7 11.1*   HGB 10.2* 9.7* 9.6* 9.4*    330 340 317     Basic Metabolic Panel  Recent Labs   Lab 05/10/22  0454 05/10/22  0015 05/09/22 2210 05/09/22 2002 05/09/22 0745 05/09/22 0730 05/08/22 2114 05/08/22  1944 05/08/22  1120 05/08/22  1116 05/08/22 0457 05/08/22 0456 05/07/22 0923 05/07/22 0454   NA  --   --  144  --   --  145*  --   --   --   --   --  145*  --  147*   POTASSIUM  --   --  3.6  --   --  3.8  3.8  --  3.3*  --  3.6  --  3.4  --  3.5   CHLORIDE  --   --  111*  --   --  110*  --   --   --   --   --  114*  --  115*   CO2  --   --  27  --   --  30  --   --   --   --   --  26  --  28   BUN  --   --  21  --   --  22  --   --   --   --   --  16  --  22   CR  --   --  0.85  --   --  0.78  --   --   --   --   --  0.66  --  0.76   * 138* 141* 160*   < > 165*   < >  --    < >  --    < > 152*   < > 171*    < > = values in this interval not displayed.     Liver Function Tests  Recent Labs   Lab 05/09/22 0730 05/08/22 0456 05/04/22  0557 05/04/22  0217   AST 75* 120* 32  --    * 126* 26  --    ALKPHOS 164* 185* 93  --    BILITOTAL 0.6 0.6 1.5*  --    ALBUMIN 1.9* 1.8* 2.0*  --    INR  --   --  1.41* 1.36*     Pancreatic Enzymes  Recent Labs   Lab 05/08/22  0456   LIPASE 135     Coagulation Profile  Recent Labs   Lab 05/04/22  0557 05/04/22 0217   INR 1.41* 1.36*   PTT 39* 44*       IMAGING:  Recent Results (from the past 24 hour(s))   CT Soft Tissue Neck w Contrast    Narrative    CT SOFT TISSUE NECK W CONTRAST 5/9/2022 10:21 AM    History:  Neck abscess, deep  "tissue      Comparison:  CT neck 5/6/2022; x-ray chest 5/8/2022     ADDITIONAL HISTORY FROM EMR: \"26 year old male who was admitted on  5/4/2022 with necrotizing soft tissue infection of the neck and  mediastinum now POD?4?s/p bilateral video-assisted thoracotomy, right  anterior median sternotomy, PEG tube, incision and drainage of  bilateral parapharyngeal abscesses\"    Technique: Following intravenous administration of nonionic iodinated  contrast medium, thin section helical CT images were obtained from the  skull base down to the level of the aortic arch.  Axial, coronal and  sagittal reformations were performed with 2-3 mm slice thickness  reconstruction. Images were reviewed in soft tissue, lung and bone  windows.    Contrast: iopamidol (ISOVUE-370) solution 100 mL    Findings:   Endotracheal intubation with tip in the upper thoracic trachea.  Interval removal of right apical chest tube and left PICC. New  postsurgical changes of left sublingual space washout with drain  placement.     Redemonstration of postsurgical changes of mediastinotomy, bilateral  washout of cervical parapharyngeal spaces with redemonstration of open  wound extending over the anterior cervical soft tissues. Wound again  extends from the level of the cricoid cartilage to the left along the  medial left sternocleidomastoid. Packing material is in place.    Consolidation of diffuse soft tissue edema of the deep spaces of the  ventral and central neck with increased fascial enhancement  surrounding the fluid collections involving the sublingual spaces,   spaces, submandibular spaces, and bilateral parapharyngeal  spaces. No definite abscess in the danger space. Increased conspicuity  of rim enhancement associated with a focal fluid collection tracking  along the left esophagus beginning at the level of T1 and extending  inferiorly to the level of T4, measures 1.3 x 1.3 x 4.8 cm.    Persistent scattered foci of emphysema in the " deep spaces of the neck  bilaterally, including in the sublingual spaces, submandibular spaces,   spaces, and left parapharyngeal space and  retrosternal/retromanubrial region. Changes favored to represent  consolidating phlegmons and evolving abscesses.    Diffuse mucosal edema throughout the upper aerodigestive tract without  focal mucosal lesion. The major salivary glands appear edematous. The  thyroid gland appears normal.    Stable scattered prominent to enlarged bilateral cervical lymph nodes  with a few nodes demonstrating cystic change, such as left level 2A  node measuring 1.8 x 1.3 cm (series 2 image 49). Increased lack of  contrast filling within the left internal jugular vein; now lack of  filling extends to the skull base, previously only up toward the  carotid bifurcation, compatible with thrombosis. This finding can be  seen in the setting of Lemierre syndrome. Right internal jugular vein  is patent.    Evaluation of the osseous structures demonstrate no worrisome lytic  erosive change. Diffuse dental caries with sequela of recent left  mandibular dental extractions. Left maxillary periapical dental  lucencies. Mild hypoplasia of the left mandibular condyle. Mild  paranasal sinus mucosal thickening. The mastoid air cells are clear.     Upper lungs demonstrate patchy dependent consolidative and groundglass  airspace opacities. Loculated bilateral pleural effusions.      Impression    Impression:  1.  Postsurgical changes of neck wound washout with large anterior  open neck wound; new left sublingual space washout with drain place.   2.  Interval changes of consolidating phlegmons and evolving abscesses  throughout the neck involving the sublingual, ,  submandibular, and bilateral parapharyngeal spaces, and  retrosternal/retromanubrial region.  3.  Increased conspicuity of rim enhancing fluid collection along the  left upper thoracic esophagus, possibly representing  paraesophageal  versus mucosal abscess. Cannot definitely exclude upper esophageal  injury.  4.  Upper lung patchy dependent consolidative and groundglass airspace  opacities; atelectasis versus infection.  5.  Loculated bilateral pleural effusions.    I have personally reviewed the examination and initial interpretation  and I agree with the findings.    RICH BATISTA MD         SYSTEM ID:  [id number]   XR Chest Port 1 View    Impression    RESIDENT PRELIMINARY INTERPRETATION  Impression: Streaky bibasilar opacities may represent atelectasis or  infection. Small bilateral pleural effusions are also present.

## 2022-05-10 NOTE — PLAN OF CARE
Cardiac: Sinus rhythm to sinus tachycardia. BP stable WNL T-max 102.0. Tylenol given X1 1600. No ectopy.  Neuro: A&Ox4. Follows commands. Hoarse voice. Strength equal bilaterally. Ax1/ SBA. Call light appropriate. Pupils equal reactive.   Respiratory: RA. Weaned off oxygen. LS diminished. Using IS and acapella devices independently. Copious secretions. Using yonker independently.  GI: Passed bedside swallow. Only giving water for now. Loose stools continued. Incont stool X1. Adv. Diet as tolerated.  : Voiding using urinal. Adequate urine output.  Skin: Large open wound L neck. Primrose drain medial under chin.    Sig Events: ENT dressing change completed. 1.0 mg of dilaudid given. Pt. Tolerated well. Cultures drawn 5/9 pending. Continue to trend fevers and treat.     Jose Machado RN SICU Neuro-ICU

## 2022-05-11 ENCOUNTER — APPOINTMENT (OUTPATIENT)
Dept: GENERAL RADIOLOGY | Facility: CLINIC | Age: 27
End: 2022-05-11
Payer: COMMERCIAL

## 2022-05-11 ENCOUNTER — APPOINTMENT (OUTPATIENT)
Dept: OCCUPATIONAL THERAPY | Facility: CLINIC | Age: 27
End: 2022-05-11
Payer: COMMERCIAL

## 2022-05-11 ENCOUNTER — APPOINTMENT (OUTPATIENT)
Dept: SPEECH THERAPY | Facility: CLINIC | Age: 27
End: 2022-05-11
Attending: DIETITIAN, REGISTERED
Payer: COMMERCIAL

## 2022-05-11 LAB
ANION GAP SERPL CALCULATED.3IONS-SCNC: 5 MMOL/L (ref 3–14)
BACTERIA ABSC ANAEROBE+AEROBE CULT: ABNORMAL
BACTERIA ABSC ANAEROBE+AEROBE CULT: ABNORMAL
BACTERIA ABSC ANAEROBE+AEROBE CULT: NORMAL
BUN SERPL-MCNC: 16 MG/DL (ref 7–30)
CALCIUM SERPL-MCNC: 8.2 MG/DL (ref 8.5–10.1)
CHLORIDE BLD-SCNC: 110 MMOL/L (ref 94–109)
CO2 SERPL-SCNC: 26 MMOL/L (ref 20–32)
CREAT SERPL-MCNC: 0.78 MG/DL (ref 0.66–1.25)
CRP SERPL-MCNC: 150 MG/L (ref 0–8)
ERYTHROCYTE [DISTWIDTH] IN BLOOD BY AUTOMATED COUNT: 14.3 % (ref 10–15)
ERYTHROCYTE [SEDIMENTATION RATE] IN BLOOD BY WESTERGREN METHOD: 98 MM/HR (ref 0–15)
GFR SERPL CREATININE-BSD FRML MDRD: >90 ML/MIN/1.73M2
GLUCOSE BLD-MCNC: 151 MG/DL (ref 70–99)
GLUCOSE BLDC GLUCOMTR-MCNC: 124 MG/DL (ref 70–99)
GLUCOSE BLDC GLUCOMTR-MCNC: 136 MG/DL (ref 70–99)
GLUCOSE BLDC GLUCOMTR-MCNC: 137 MG/DL (ref 70–99)
GLUCOSE BLDC GLUCOMTR-MCNC: 137 MG/DL (ref 70–99)
GLUCOSE BLDC GLUCOMTR-MCNC: 152 MG/DL (ref 70–99)
GLUCOSE BLDC GLUCOMTR-MCNC: 164 MG/DL (ref 70–99)
HCT VFR BLD AUTO: 34 % (ref 40–53)
HGB BLD-MCNC: 10.2 G/DL (ref 13.3–17.7)
LACTATE SERPL-SCNC: 0.9 MMOL/L (ref 0.7–2)
MAGNESIUM SERPL-MCNC: 2.3 MG/DL (ref 1.6–2.3)
MCH RBC QN AUTO: 29.1 PG (ref 26.5–33)
MCHC RBC AUTO-ENTMCNC: 30 G/DL (ref 31.5–36.5)
MCV RBC AUTO: 97 FL (ref 78–100)
PATH REPORT.COMMENTS IMP SPEC: NORMAL
PATH REPORT.COMMENTS IMP SPEC: NORMAL
PATH REPORT.FINAL DX SPEC: NORMAL
PATH REPORT.GROSS SPEC: NORMAL
PATH REPORT.MICROSCOPIC SPEC OTHER STN: NORMAL
PATH REPORT.RELEVANT HX SPEC: NORMAL
PHOSPHATE SERPL-MCNC: 3.1 MG/DL (ref 2.5–4.5)
PHOTO IMAGE: NORMAL
PLATELET # BLD AUTO: 328 10E3/UL (ref 150–450)
POTASSIUM BLD-SCNC: 3.6 MMOL/L (ref 3.4–5.3)
RBC # BLD AUTO: 3.5 10E6/UL (ref 4.4–5.9)
SARS-COV-2 RNA RESP QL NAA+PROBE: NEGATIVE
SODIUM SERPL-SCNC: 141 MMOL/L (ref 133–144)
WBC # BLD AUTO: 9.2 10E3/UL (ref 4–11)

## 2022-05-11 PROCEDURE — 250N000013 HC RX MED GY IP 250 OP 250 PS 637: Performed by: OTOLARYNGOLOGY

## 2022-05-11 PROCEDURE — U0005 INFEC AGEN DETEC AMPLI PROBE: HCPCS

## 2022-05-11 PROCEDURE — 71045 X-RAY EXAM CHEST 1 VIEW: CPT

## 2022-05-11 PROCEDURE — 250N000011 HC RX IP 250 OP 636: Performed by: PHYSICIAN ASSISTANT

## 2022-05-11 PROCEDURE — 99233 SBSQ HOSP IP/OBS HIGH 50: CPT | Performed by: INTERNAL MEDICINE

## 2022-05-11 PROCEDURE — 92610 EVALUATE SWALLOWING FUNCTION: CPT | Mod: GN

## 2022-05-11 PROCEDURE — 84100 ASSAY OF PHOSPHORUS: CPT

## 2022-05-11 PROCEDURE — 97535 SELF CARE MNGMENT TRAINING: CPT | Mod: GO

## 2022-05-11 PROCEDURE — 85027 COMPLETE CBC AUTOMATED: CPT

## 2022-05-11 PROCEDURE — 250N000011 HC RX IP 250 OP 636

## 2022-05-11 PROCEDURE — 83605 ASSAY OF LACTIC ACID: CPT | Performed by: STUDENT IN AN ORGANIZED HEALTH CARE EDUCATION/TRAINING PROGRAM

## 2022-05-11 PROCEDURE — 250N000013 HC RX MED GY IP 250 OP 250 PS 637

## 2022-05-11 PROCEDURE — 97530 THERAPEUTIC ACTIVITIES: CPT | Mod: GO

## 2022-05-11 PROCEDURE — 36415 COLL VENOUS BLD VENIPUNCTURE: CPT

## 2022-05-11 PROCEDURE — 83735 ASSAY OF MAGNESIUM: CPT

## 2022-05-11 PROCEDURE — 85652 RBC SED RATE AUTOMATED: CPT

## 2022-05-11 PROCEDURE — 71045 X-RAY EXAM CHEST 1 VIEW: CPT | Mod: 26 | Performed by: STUDENT IN AN ORGANIZED HEALTH CARE EDUCATION/TRAINING PROGRAM

## 2022-05-11 PROCEDURE — 272N000202 HC AEROBIKA WITH MANOMETER

## 2022-05-11 PROCEDURE — 200N000002 HC R&B ICU UMMC

## 2022-05-11 PROCEDURE — 250N000013 HC RX MED GY IP 250 OP 250 PS 637: Performed by: STUDENT IN AN ORGANIZED HEALTH CARE EDUCATION/TRAINING PROGRAM

## 2022-05-11 PROCEDURE — 86140 C-REACTIVE PROTEIN: CPT

## 2022-05-11 PROCEDURE — 92526 ORAL FUNCTION THERAPY: CPT | Mod: GN

## 2022-05-11 PROCEDURE — 82310 ASSAY OF CALCIUM: CPT

## 2022-05-11 PROCEDURE — 250N000013 HC RX MED GY IP 250 OP 250 PS 637: Performed by: DIETITIAN, REGISTERED

## 2022-05-11 PROCEDURE — 88300 SURGICAL PATH GROSS: CPT | Mod: 26 | Performed by: PATHOLOGY

## 2022-05-11 RX ORDER — POTASSIUM CHLORIDE 1.5 G/1.58G
20 POWDER, FOR SOLUTION ORAL ONCE
Status: COMPLETED | OUTPATIENT
Start: 2022-05-11 | End: 2022-05-11

## 2022-05-11 RX ORDER — GUAR GUM
1 PACKET (EA) ORAL 4 TIMES DAILY
Status: DISCONTINUED | OUTPATIENT
Start: 2022-05-11 | End: 2022-05-25

## 2022-05-11 RX ORDER — LIDOCAINE 4 G/G
1 PATCH TOPICAL
Status: DISCONTINUED | OUTPATIENT
Start: 2022-05-11 | End: 2022-05-11

## 2022-05-11 RX ADMIN — ACETAMINOPHEN 325MG 650 MG: 325 TABLET ORAL at 07:56

## 2022-05-11 RX ADMIN — Medication 2 PACKET: at 07:57

## 2022-05-11 RX ADMIN — HYOSCYAMINE SULFATE: 16 SOLUTION at 10:25

## 2022-05-11 RX ADMIN — PIPERACILLIN AND TAZOBACTAM 3.38 G: 3; .375 INJECTION, POWDER, FOR SOLUTION INTRAVENOUS at 15:45

## 2022-05-11 RX ADMIN — POTASSIUM CHLORIDE 20 MEQ: 1.5 POWDER, FOR SOLUTION ORAL at 07:56

## 2022-05-11 RX ADMIN — Medication 1 PACKET: at 19:50

## 2022-05-11 RX ADMIN — Medication 2 PACKET: at 19:50

## 2022-05-11 RX ADMIN — INSULIN ASPART 1 UNITS: 100 INJECTION, SOLUTION INTRAVENOUS; SUBCUTANEOUS at 15:44

## 2022-05-11 RX ADMIN — PIPERACILLIN AND TAZOBACTAM 3.38 G: 3; .375 INJECTION, POWDER, FOR SOLUTION INTRAVENOUS at 10:26

## 2022-05-11 RX ADMIN — ENOXAPARIN SODIUM 90 MG: 100 INJECTION SUBCUTANEOUS at 23:39

## 2022-05-11 RX ADMIN — PIPERACILLIN AND TAZOBACTAM 3.38 G: 3; .375 INJECTION, POWDER, FOR SOLUTION INTRAVENOUS at 03:50

## 2022-05-11 RX ADMIN — PIPERACILLIN AND TAZOBACTAM 3.38 G: 3; .375 INJECTION, POWDER, FOR SOLUTION INTRAVENOUS at 21:35

## 2022-05-11 RX ADMIN — Medication 1 PACKET: at 15:46

## 2022-05-11 RX ADMIN — ACETAMINOPHEN 325MG 650 MG: 325 TABLET ORAL at 19:50

## 2022-05-11 RX ADMIN — INSULIN ASPART 1 UNITS: 100 INJECTION, SOLUTION INTRAVENOUS; SUBCUTANEOUS at 07:55

## 2022-05-11 RX ADMIN — ACETAMINOPHEN 325MG 650 MG: 325 TABLET ORAL at 15:45

## 2022-05-11 RX ADMIN — LIDOCAINE 1 PATCH: 246 PATCH TOPICAL at 11:40

## 2022-05-11 RX ADMIN — FLUCONAZOLE 400 MG: 2 INJECTION INTRAVENOUS at 13:49

## 2022-05-11 RX ADMIN — ENOXAPARIN SODIUM 90 MG: 100 INJECTION SUBCUTANEOUS at 11:40

## 2022-05-11 ASSESSMENT — ACTIVITIES OF DAILY LIVING (ADL)
ADLS_ACUITY_SCORE: 22

## 2022-05-11 NOTE — PROGRESS NOTES
Cardiac: Sinus rhythm to sinus tachycardia. BP stable WNL T-max 100. Tylenol given X1  Neuro: A&Ox4. Follows commands. Strength equal bilaterally. Ax1/ SBA. Call light appropriate. Anxious at bedtime  Respiratory: RA. Weaned off oxygen. LS coarse. Using IS and acapella devices independently. Copious secretions. Using yonker independently.  GI: Passed bedside swallow. Coughing on water after eval Only giving water for now. Loose stools continued.   : Voiding using urinal. Adequate urine output.  Skin: Large open wound L neck. Primrose drain medial under chin.

## 2022-05-11 NOTE — PROGRESS NOTES
"ENT Progress Note  5/11/2022    S: Feeling ok    O  /73 (BP Location: Right arm)   Pulse 106   Temp 99.8  F (37.7  C) (Oral)   Resp 22   Ht 1.9 m (6' 2.8\")   Wt 98.4 kg (216 lb 14.9 oz)   SpO2 96%   BMI 27.26 kg/m     Tm 103, Tc 97.5, MAPS 70s-80s, HR 50s-100s  GENERAL: Extubated, comfortable   HEENT: Q tip probed deep into parapharyngeal space - no purulence, penrose with minimum purulence, removed, q-tip probed deep into the wound, no purulence. granulation tissue forming on strap muscles, no pus, neck loosely packed with kerlix  Residual dentition poor. Dakins-soaked dressings changed.    LABS  WBC 9.2   (150)  ESR 98 (96)    Assessment and Plan  Robert Gallo is a 26 year old male with no significant past medical history with necrotizing fasciitis of the neck with mediastinal involvement s/p I&D of bilateral parapharyngeal spaces, sublingual space, bilateral necks, and extraction of teeth #19 and 20 and bilateral VATS and PEG tube placement by Thoracic Surgery on 5/4/2022. Clinical status today concerning for persistent/worsening infection given fever, tachycardia. Infections of head and neck continue to appear improved, will discuss with SICU and ID possibility of other infection sites.     Neuro:  - tolerated his dressing changes and probing without supplemental meds  - pain seems reasonably controlled   HEENT:  - Oral surgery: no further extractions currently, appreciate input, patient notes a bony projection near the extraction site that is causing some irritation, needs some dental follow up  - Twice daily packing changes to the neck with Dakins soaked Kerlix to be performed by ENT  Respiratory:  - room air   CV/heme:  - hemodynamically stable without pressors, some persistent tachycardia   FEN/GI:  - Status post PEG tube placement by Thoracic Surgery  :  - voiding   Endo  -ISS   Heme/ID:  - ID - fluconazole, Zosyn  - Trend CBC, CRP, and ESR  - therapeutic lovenox - will follow " duration with possible heme discussion  - CT neck w/ IV 5/9  PPX:  - lovenox as above  - SCDs     --Patient and plan discussed with Dr. Bao Hunter MD  Otolaryngology-Head & Neck Surgery   Please contact ENT by dialing [phone] and entering job code [phone].

## 2022-05-11 NOTE — PROGRESS NOTES
General [facility]: Follow-up Note      Patient:  Robert Gallo, Date of birth 1995, Medical record number [id number]  Date of Visit:  May 9, 2022  Reason for consult: necrotizing neck infection         Assessment and Recommendations:     ID Problem list:  1. Necrotizing soft tissue infection of neck and mediastinum  2. S/p neck and mediastinum debridement 5/4/22 by ENT and thoracic surgery  3. Polymicrobial infection with prelim Strep anginosus, Staph epidermidis, Prevotella, Carlyn dubliniensis  4. Fevers  5. Dental caries, s/p tooth extractions 5/4  6. Methamphetamine use, inhaled    Recs:  1. Recommend Unasyn and fluconazole for isolated organisms. Team using Zosyn and fluconazole.  2. Consider para-esophageal fluid collection as possible undrained source of ongoing fevers, consider re-imaging tomorrow if still febrile  3. Will need connection to substance abuse services prior to discharge    Discussion:  Robert is a 26M with PMH including dental caries who was admitted 5/4 due to necrotizing soft tissue infection of the neck and mediastinum, now s/p debridement by ENT and thoracic surgery 5/4 and s/p VATS/chest tubes and PEG and also extraction of multiple teeth. His OR cultures have grown polymicrobial bacteria thus far including Strep anginosus, Staph epidermidis, and Prevotella, and yeast. He has been on IV zosyn over the weekend which was narrowed to IV unasyn, then changed back to zosyn at ENTs request. Fluconazole was added on 5/9. Still febrile but CRP and WBC stable. Unclear ongoing cause of fever but suspect the 1.3 x 1.3 x 4.8 paraesophageal abscess as the cause and awaiting ENT and thoracic surgeries input.     Thank you for allowing us to participate in the care of this patient. ID will continue to follow.    Discussed with Dr. Clayton.    Yg Douglas MD  Infectious Disease Fellow           Interval History:     Nursing notes reviewed. Continues to have fevers but feeling  well. Sitting up in chair. Pain around G-tube site. Stools up but on tube feeds. Productive cough, no SOB.         Review of Systems:     8 point ROS negative unless noted above         Current Antimicrobials     IV Zosyn  Fluconazole         Physical Exam:   Ranges for vital signs:  Temp:  [99.4  F (37.4  C)-102  F (38.9  C)] 100.6  F (38.1  C)  Pulse:  [] 96  Resp:  [16-22] 16  BP: ()/(63-84) 121/65  SpO2:  [92 %-99 %] 99 %    Exam:  GENERAL:  well-developed, well-nourished, sitting in bed in no acute distress.   ENT/NECK:  Head is normocephalic, atraumatic. Extubated and communicative. +surgical wounds down left neck to upper chest with serosanguinous fluid on dressings.   LUNGS:  Breathing comfortably on room air. Basilar crackles  CARDIOVASCULAR:  Regular rate and rhythm. No murmurs.   ABDOMEN:  Non-distended, soft, nontender.  EXT: Extremities warm and well perfused.  SKIN:  No acute rashes on extremities.   NEUROLOGIC:  Awake and orientated  LINES: PIVs without erythema or drainage         Laboratory Data:       Inflammatory Markers    Recent Labs   Lab Test 05/11/22  0345 05/10/22  0557 05/09/22  0730 05/08/22  0456 05/07/22  0454 05/06/22  0344 05/05/22  0353 05/04/22  0804   SED 98* 96* 96* 101* 113* 119* 118* 80*   .0* 150.0* 160.0* 140.0* 160.0* 280.0* 440.0* 350.0*       Hematology Studies    Recent Labs   Lab Test 05/11/22  0345 05/10/22  0557 05/09/22  2210 05/09/22  0730 05/08/22  0456 05/07/22  0454   WBC 9.2 8.3 8.1 9.3 9.7 11.1*   HGB 10.2* 10.2* 10.2* 9.7* 9.6* 9.4*   MCV 97 95 95 95 94 94    328 333 330 340 317       Metabolic Studies     Recent Labs   Lab Test 05/11/22  0345 05/10/22  0557 05/09/22  2210 05/09/22  0730 05/08/22  1944 05/08/22  1116 05/08/22  0456    142 144 145*  --   --  145*   POTASSIUM 3.6 3.4 3.6 3.8  3.8 3.3*   < > 3.4   CHLORIDE 110* 109 111* 110*  --   --  114*   CO2 26 26 27 30  --   --  26   BUN 16 18 21 22  --   --  16   CR 0.78 0.73  0.85 0.78  --   --  0.66   GFRESTIMATED >90 >90 >90 >90  --   --  >90    < > = values in this interval not displayed.       Hepatic Studies    Recent Labs   Lab Test 05/09/22  0730 05/08/22  0456 05/04/22  0557   BILITOTAL 0.6 0.6 1.5*   ALKPHOS 164* 185* 93   ALBUMIN 1.9* 1.8* 2.0*   AST 75* 120* 32   * 126* 26       Microbiology:  Culture   Date Value Ref Range Status   05/09/2022 No growth after 1 day  Preliminary   05/09/2022 No growth after 1 day  Preliminary   05/08/2022 No growth after 2 days  Preliminary   05/08/2022 No growth after 2 days  Preliminary   05/06/2022 No anaerobic organisms isolated after 2 days  Preliminary   05/06/2022 1+ Normal roslyn  Final   05/06/2022 4+ Carlyn dubliniensis (A)  Final     Comment:     Susceptibilities not routinely done   05/06/2022 3+ Carlyn dubliniensis (A)  Preliminary   05/06/2022 1+ Carlyn dubliniensis (A)  Final     Comment:     Susceptibilities not routinely done   05/04/2022 No Growth  Final   05/04/2022 No Growth  Final   05/04/2022 1+ Prevotella species (A)  Preliminary     Comment:     Susceptibilities not routinely done   05/04/2022 2+ Mixed Aerobic and Anaerobic roslyn  Preliminary   05/04/2022 Yeast (A)  Preliminary   05/04/2022 1+ Streptococcus anginosus (A)  Final     Comment:     This organism is susceptible to ampicillin, penicillin, vancomycin and the cephalosporins. If treatment is required and your patient is allergic to penicillin, contact the microbiology lab within 5 days to request susceptibility testing.   05/04/2022 1+ Normal roslyn  Final   05/04/2022 2+ Prevotella species (A)  Final     Comment:     Susceptibilities not routinely done   05/04/2022 2+ Prevotella species (A)  Final     Comment:     Susceptibilities not routinely done   05/04/2022 4+ Mixed Aerobic and Anaerobic roslyn  Final   05/04/2022 No growth after 7 days  Preliminary   05/04/2022 1+ Streptococcus anginosus (A)  Final     Comment:     This organism is susceptible to  ampicillin, penicillin, vancomycin and the cephalosporins. If treatment is required and your patient is allergic to penicillin, contact the microbiology lab within 5 days to request susceptibility testing.   2022 1+ Staphylococcus epidermidis (A)  Corrected              Imagin/6/22 CT read:  IMPRESSION:  1.  CT of the head demonstrates no acute cranial abnormality. No  abnormal intracranial contrast enhancing lesions.   2.  CT of the neck shows postsurgical changes of anterior median  sternotomy and bilateral incision and drainage of cervical  parapharyngeal spaces for necrotizing infection.  Large open wound  extends over the anterior cervical soft tissues with diffuse  heterogeneous edema of multiple neck spaces as described in great  detail above sparing the danger space. No definite residual  rim-enhancing collection however, the edematous collections could  represent developing phlegmon.   3.  No definite flow seen in the left mid and inferior internal  jugular vein, compatible with thrombus. 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  4.  Diffuse dental caries, left maxillary periapical dental disease  and recent left mandibular dental extractions.    22 CT read:  Impression:  1.  Postsurgical changes of neck wound washout with large anterior  open neck wound; new left sublingual space washout with drain place.   2.  Interval changes of consolidating phlegmons and evolving abscesses  throughout the neck involving the sublingual, ,  submandibular, and bilateral parapharyngeal spaces, and  retrosternal/retromanubrial region.  3.  Increased conspicuity of rim enhancing fluid collection along the  left upper thoracic esophagus, possibly representing paraesophageal  versus mucosal abscess. Cannot definitely exclude upper esophageal  injury.  4.  Upper lung patchy dependent consolidative and groundglass airspace  opacities; atelectasis versus infection.  5.  Loculated  bilateral pleural effusions.

## 2022-05-11 NOTE — PROGRESS NOTES
CLINICAL NUTRITION SERVICES - REASSESSMENT NOTE     Nutrition Prescription    RECOMMENDATIONS FOR MDs/PROVIDERS TO ORDER:  None at this time.    Malnutrition Status:    Patient does not meet two of the established criteria necessary for diagnosing malnutrition but is at risk for malnutrition    Recommendations already ordered by Registered Dietitian (RD):  Add Nutrisource fiber 1 pkt TID (3 pkts daily)    Continue EN as ordered: Osmolite 1.5 at 65 ml/hr (1560 ml/day) to provide: 2340 kcals, 98 g PRO, 1189 ml free H20, 318 g CHO, and 0 g fiber daily.   + 2 packet Prosource modular BID (4 pkts total) for total provisions of 2580 Kcals (29 Kcal/kg), 142 gm pro (1.6 gm/kg)     Future/Additional Recommendations:  If diet advanced, start supplement (Ensure Clear)     EVALUATION OF THE PROGRESS TOWARD GOALS   Diet: NPO, ice chips only.    Nutrition Support: Osmolite 1.5 at 65 ml/hr (1560 ml/day) to provide: 2340 kcals, 98 g PRO, 1189 ml free H20, 318 g CHO, and 0 g fiber daily.   Modular: 2 packet Prosource modular BID for total provisions of 2580 Kcals (29 Kcal/kg), 142 gm pro (1.6 gm/kg)     EN access: PEG    Intake:   Tube feeding past 7 days averaging 948 mL/day, with 4 packets Prosource daily provides 1582 kcal (18 kcal/kg) and 104 g protein (1.2 g/kg) to meet ~72% low-end energy, 100% low-end protein needs.    Low tube-feed intake due to initial slow advancement, interruptions for procedures.    NEW FINDINGS   Diet/SLP - SLP following for swallow function. Per note 5/11 pt is currently NPO with ice chips and video swallow study recommended.    -Wt: No weight history available. Weight gain this admission may be impacted by fluid shifts vs scale differences. Per pt report, usual weight prior to hospitalization ~200 lbs. Continue current dosing weight 88 kg.   05/11/22 0400 98.4 kg (216 lb 14.9 oz)   05/10/22 0000 98.2 kg (216 lb 7.9 oz)   05/04/22 0620 88 kg (194 lb 0.1 oz)     -Labs:  Electrolytes WNL  Glucose range  "128-164 past 24 hours    -GI:   4-8 loose stools per day past 4 days    -Meds:   Novolog medium sliding scale Q4 hours  Zosyn  Senna-docusate (held for loose stools)  Replacement protocols for potassium, magnesium, phosphorus    -PMH/Nutrition Hx:   Pt reports difficulty eating ~10 days prior to admission due to fevers, nausea/vomiting, and difficulty swallowing. Reports he \"almost choked on a pork chop\", and was eating very little, but continued to drink milk and water up to admission. Believes he lost some weight prior to admission and since hospitalization, but unsure how much.   Discussed supplements, pt open to Ensure Clear to start if/when diet advanced.      MALNUTRITION  % Intake: < 75% for > 7 days (moderate)  % Weight Loss: None noted   Subcutaneous Fat Loss: None observed  Muscle Loss: None observed  Fluid Accumulation/Edema: None noted  Malnutrition Diagnosis: Patient does not meet two of the established criteria necessary for diagnosing malnutrition but is at risk for malnutrition    Previous Goals   Total avg nutritional intake to meet a minimum of 25 kcal/kg and 1.2 g PRO/kg daily (per dosing wt 88 kg).  Evaluation: Not met    Previous Nutrition Diagnosis  Predicted inadequate nutrient intake related to NPO status as evidenced by PO intake prevented by intubation, dental and neck abscesses, and pt reliant on nutrition support to meet needs.  Evaluation: Improving    CURRENT NUTRITION DIAGNOSIS  Inadequate energy intake related to reliance on enteral nutrition with potential for interruption during hospitalization as evidenced by NPO status, tube feed volume past 7 days meeting <75% assessed energy needs.    INTERVENTIONS  Implementation  Enteral Nutrition - As ordered above    Goals  Total avg nutritional intake to meet a minimum of 25 kcal/kg and 1.2 g PRO/kg daily (per dosing wt 88 kg).    Monitoring/Evaluation  Progress toward goals will be monitored and evaluated per protocol.    Gayle " Young  Dietetic Intern

## 2022-05-11 NOTE — PLAN OF CARE
Plan of Care [phone]    Shift Updates:   Primrose drain pulled and neck dressing changed per ENT this morning- pt tolerated well, no dilaudid required. Pt failed swallow study diet changed to NPO with ice chips. Pending swallow study at 2 pm tomorrow with x-ray. Plan to tx to 6A when bed becomes available.    Assessment & Vitals:   VSS. T max 101.4. Aox4, calm and cooperative with cares. SR/ST in 90-100s. SBPs in 120-130s. Satting >95% on RA. 1 BM in toilet this shift. AUO. Tolerating TF through PEG, FWF increased to 200 q 4. Tolerating NPO diet with ice chips. No c/o pain aside from PEG tube being sore, resolved w lidocaine patch. 1 PIV in place. No new skin deficits noted. Ax1 with gait belt and walker, pt not completey steady when walking further distances.

## 2022-05-11 NOTE — PROGRESS NOTES
05/11/22 1110   General Information   Onset of Illness/Injury or Date of Surgery 05/04/22   Referring Physician Zenobia Urena PA-C   Patient/Family Therapy Goal Statement (SLP) None stated   Pertinent History of Current Problem Robert Gallo is a 26 year old male with no significant past medical history with necrotizing fasciitis of the neck with mediastinal involvement s/p I&D of bilateral parapharyngeal spaces, sublingual space, bilateral necks, and extraction of teeth #19 and 20 and bilateral VATS and PEG tube placement by Thoracic Surgery on 5/4/2022. Clinical status today concerning for persistent/worsening infection given fever, tachycardia. Infections of head and neck continue to appear improved, will discuss with SICU and ID possibility of other infection sites. Per ENT, there is no concern for leak, ok to trial up to soft solid textures. Clinical swallow eval completed per MD orders to further assess oropharyngeal swallow function.   Past History of Dysphagia No hx of dysphagia per chart review or pt report. Per nursing, pt coughing with water. ENT cleared pt for up to soft solid textures.   Type of Evaluation   Type of Evaluation Swallow Evaluation   Oral Motor   Oral Musculature generally intact   Structural Abnormalities present   Mucosal Quality adequate   Dentition (Oral Motor)   Dentition (Oral Motor) poor condition;some missing teeth   Facial Symmetry (Oral Motor)   Facial Symmetry (Oral Motor) WNL   Lip Function (Oral Motor)   Lip Range of Motion (Oral Motor) WNL   Tongue Function (Oral Motor)   Tongue ROM (Oral Motor) WNL   Jaw Function (Oral Motor)   Jaw Function (Oral Motor) range of motion impairment   Jaw Range of Motion Impairment minimal impairment   Cough/Swallow/Gag Reflex (Oral Motor)   Soft Palate/Velum (Oral Motor) WNL   Volitional Throat Clear/Cough (Oral Motor) reduced strength   Vocal Quality/Secretion Management (Oral Motor)   Vocal Quality (Oral Motor) harsh;WFL    Secretion Management (Oral Motor) wet/gurgly vocal quality   General Swallowing Observations   Respiratory Support (General Swallowing Observations) none   Current Diet/Method of Nutritional Intake (General Swallowing Observations, NIS) clear liquid diet;thin liquids (level 0);gastrostomy tube (PEG)   Swallowing Evaluation Clinical swallow evaluation   Clinical Swallow Evaluation   Feeding Assistance no assistance needed   Clinical Swallow Evaluation Textures Trialed thin liquids;mildly thick liquids   Clinical Swallow Eval: Thin Liquid Texture Trial   Mode of Presentation, Thin Liquids cup;spoon;self-fed   Volume of Liquid or Food Presented 3 ice chips, 2 oz H20   Oral Phase of Swallow premature pharyngeal entry   Pharyngeal Phase of Swallow impaired;coughing/choking;wet vocal quality after swallow   Diagnostic Statement Ice chips and thin liquids via cup resulted in overt s/sx of aspiration marked by coughing and wet vocal quality   Clinical Swallow Eval: Mildly Thick Liquids   Mode of Presentation cup;self-fed   Volume Presented 2 oz   Oral Phase premature pharyngeal entry   Pharyngeal Phase impaired;coughing/choking;wet vocal quality after swallow   Diagnostic Statement Mildly thick liquids via cup resulted in overt s/sx of aspiration marked by coughing and wet vocal quality   Swallowing Recommendations   Diet Consistency Recommendations NPO;ice chips only   Medication Administration Recommendations, Swallowing (SLP) continue non-oral routes for meds   Instrumental Assessment Recommendations VFSS (videofluoroscopic swallowing study)  (to further assess aspiration risk)   General Therapy Interventions   Planned Therapy Interventions Dysphagia Treatment   Dysphagia treatment   (PO readiness)   Clinical Impression   Criteria for Skilled Therapeutic Interventions Met (SLP Eval) Yes, treatment indicated   SLP Diagnosis moderate-severe oropharyngeal dysphagia   Risks & Benefits of therapy have been explained  evaluation/treatment results reviewed;care plan/treatment goals reviewed;risks/benefits reviewed;current/potential barriers reviewed;participants voiced agreement with care plan;participants included;patient   Clinical Impression Comments Clinical swallow eval completed per MD orders. Pt presents with moderate-severe oropharyngeal dysphagia in the setting of necrotizing fasciitis of the neck with mediastinal involvement. Pt is s/p I&D of bilateral parapharyngeal spaces, sublingual space, bilateral necks, and extraction of teeth #19 and 20 and bilateral VATS and PEG tube placement. Pt with baseline mildly wet vocal quality with weak cough response. Pt with limited jaw ROM, otherwise oral mech exam generally WFL. Trials of ice chips, thin liquids via cup, and mildly thick liquids resulted in overt s/sx of aspiration marked by coughing and wet vocal quality. No additional PO trials given due to high risk for aspiration. ENT PA also indicated concern for food/liquid collecting in posterior pharyngeal wall. Recommend pt continue NPO with thorough oral cares. OK for ice chips for comfort after oral cares. Recommend completion of XR video swallow study to further assess oropharyngeal swallow function and aspiration risk. ST to continue to follow. Anticipate pt will require ST follow-up at discharge.   SLP Discharge Planning   SLP Discharge Recommendation home with outpatient speech therapy   SLP Rationale for DC Rec pt well-below baseline oropharyngeal swallowing skills.   SLP Brief overview of current status  Recommend pt continue NPO with thorough oral cares. OK for ice chips for comfort after oral cares. Recommend completion of XR video swallow study to further assess oropharyngeal swallow function and aspiration risk.    Total Evaluation Time   Total Evaluation Time (Minutes) 12   SLP Goals   Therapy Frequency (SLP Eval) 5 times/wk   SLP Predicted Duration/Target Date for Goal Attainment 07/08/22   SLP Goals Swallow    SLP: Safely tolerate diet without signs/symptoms of aspiration One P.O. texture;With use of swallow precautions

## 2022-05-11 NOTE — PROGRESS NOTES
SURGICAL ICU PROGRESS NOTE  05/11/2022      PRIMARY TEAM: ENT  PRIMARY PHYSICIAN: Dr. Milena Gore MD  REASON FOR CRITICAL CARE ADMISSION: respiratory and hemodynamic monitoring   Date of Service (when I saw the patient): 05/11/2022    ASSESSMENT:  Robert Gallo is a 26 year old male who was admitted on 5/4/2022 with necrotizing soft tissue infection of the neck and mediastinum now POD 7 s/p BILATERAL VIDEO ASSISTED THORACOTOMY. RIGHT ANTERIOR MEDIASTINOTYOMY. PEG TUBE. INCISION AND DRAINAGE PARAPHARYNGEAL ABCESS ON RIGHT AND LEFT, remaining intubated and sedated per ENT primary, pending resolution of infection and closure of neck wound.    PLAN:  Changes today:  - Dressing changes with dilaudid   - Clear liquid diet   - Transfer to the floor     Neurological:  # Acute pain   #Alcohol use disorder  - Dressing changes with dilaudid  - Discontinued fentanyl and ketamine   - Sedation: not requiring, precedex discontinued    - Sleep: Melatonin   - Monitor neurological status. Delirium preventions and precautions.   - AUD: consult addiction medicine prior to discharge     Pulmonary:   # Post operative ventilatory support  # Aspiration vs atelectasis  # Pulmonary edema   - Extubated 5/9, on room air   - CXR: small pleural effusions, concern for atelectasis vs infection in bases    - Chest physiotherapy and IS   - Chest and neck packing to be changed BID by ENT     Cardiovascular:    # Shock-distributive   - MAP consistently >75, continue to monitor   - Not requiring pressors    Gastroenterology/Nutrition:  - Bowel regimen: senna BID and miralax PRN   - Clear liquid diet plus tube feeds at goal via PEG   - Diet: Osmolite 1.5 at goal 65mL/hr    Fluids/Electrolytes:   # Hypocalcemia  # Hypophosphatemia, resolved  # Hypokalemia, resolved  # Hypernatremia, resolved  - FWF 60 q4 hours    - Replete lytes PRN   - Continue to monitor BMP daily     Renal:  - Urine output (0.8cc/kg/hr). Will continue to monitor intake and  output.  - On doxazosin 2mg daily    Endocrine:  # Stress hyperglycemia   - Sliding scale for glucose management. Goal to keep BG< 180 for optimal wound healing   - HgbA1C 4.8%  - Completed stress dose steroids on 5/8    ID:  # Necrotizing soft tissue infection of the odontogenic facial region with mediastinal involvement   # Septic thrombophlebitis of the left internal jugular  # Fever, resolving    # Leukocytosis, resolved    # Septic Shock, resolved    CT Neck 5/9 w/ ring enhancing fluid near left upper thoracic esophagus concerning for abscess, can't rule out esophageal injury  - Per thoracic surgery, no further intervention at this time, continue with abx  - Per ID, recommend para-esophageal fluid collection as possible undrained source of ongoing fevers, fevers improving   - Restart zosyn per ENT and continue fluconazole; per ID, Unasyn would be sufficient  - No leukocytosis, ESR and CRP stable    Cx:  - Blood cx (5/4,5/6, 5/9) no growth   - Soft tissue cx positive (5/4) for Prevotella and Strep anginosus   - Respiratory cx (5/6): 1+ candida dubliniensis  - Oral abscess (5/6): 4+ yeast, 4+ candida dubliniensis      Heme:     # Acute blood loss anemia   # Elevated Fibrinogen   - Hgb 10.2, stable  - Transfuse if hgb <7.0 or signs/symptoms of hypoperfusion. Monitor and trend.   - Continue therapeutic lovenox 90 mg BID for L IJ  Thrombus, confirmed by ultrasound (5/8)    Musculoskeletal:  - Physical and occupational therapy consult   - Patient able to participate well with PT    Skin:  Routine SICU cares     General Cares/Prophylaxis:    DVT Prophylaxis: therapeutic lovenox, SCDs  GI Prophylaxis: None  Restraints: Restraints for medical healing needed: NO   Code Status: Full code     Lines/ tubes/ drains:  - Left PIV x2, PEG (5/4)    Disposition:  - Transfer to floor as he is now tolerating dressing changes     Patient seen, findings and plan discussed with surgical ICU staff, Dr. Hemphill.       LAITH, ***, MD, was  present with the medical/NAGI student who participated in the service and in the documentation of the note.  I have verified the history and personally performed the physical exam and medical decision making.  I agree with the assessment and plan of care as documented in the note.         Date of Service (when I saw the patient): 05/09/22    Kit Bird, MS4    I agree with the documentation provided by the medical student above and was present for the history and physical exam. I made updates or corrections as appropriate.       - - - - - - - - - - - - - - - - - - - - - - - - - - - - - - - - - - - - - - - - - - - - - -   Subjective: No acute events overnight. Slowly starting liquid diet. Up with PT. Having loose stools. Tolerates dressing changes well and will likely be transferred to the floor today.     REVIEW OF SYSTEMS: Otherwise negative     PHYSICAL EXAMINATION:  Temp:  [99.4  F (37.4  C)-102  F (38.9  C)] 99.8  F (37.7  C)  Pulse:  [] 101  Resp:  [16-22] 22  BP: (123-144)/(62-84) 138/74  SpO2:  [92 %-97 %] 96 %  General: Resting comfortably in bed, no acute distress, interactive  HEENT: Neck packing in place  Neuro: Alert, following commands, moves all extremities spontaneously   Pulm/Resp: Breathing comfortably on room air, no accessory muscle use, lungs clear to auscultation bilaterally, no wheezes or crackles   CV: RRR, distal pulses palpable   Abdomen: Soft, non-distended, non-tender, no rebound tenderness or guarding, no masses, PEG   : No feldman  Incisions/Skin: packed and clean   MSK/Extremities: No peripheral edema, moving all extremities, calves equal, extremities well perfused    LABS: Reviewed.   Arterial Blood Gases   Recent Labs   Lab 05/04/22  0918   PH 7.37   PCO2 41   PO2 175*   HCO3 24     Complete Blood Count   Recent Labs   Lab 05/11/22  0345 05/10/22  0557 05/09/22  2210 05/09/22  0730   WBC 9.2 8.3 8.1 9.3   HGB 10.2* 10.2* 10.2* 9.7*    328 333 330     Basic Metabolic  Panel  Recent Labs   Lab 05/11/22  0349 05/11/22  0345 05/10/22  2314 05/10/22  1935 05/10/22  0818 05/10/22  0557 05/10/22  0015 05/09/22  2210 05/09/22  0745 05/09/22  0730   NA  --  141  --   --   --  142  --  144  --  145*   POTASSIUM  --  3.6  --   --   --  3.4  --  3.6  --  3.8  3.8   CHLORIDE  --  110*  --   --   --  109  --  111*  --  110*   CO2  --  26  --   --   --  26  --  27  --  30   BUN  --  16  --   --   --  18  --  21  --  22   CR  --  0.78  --   --   --  0.73  --  0.85  --  0.78   * 151* 139* 143*   < > 159*   < > 141*   < > 165*    < > = values in this interval not displayed.     Liver Function Tests  Recent Labs   Lab 05/09/22  0730 05/08/22  0456   AST 75* 120*   * 126*   ALKPHOS 164* 185*   BILITOTAL 0.6 0.6   ALBUMIN 1.9* 1.8*     Pancreatic Enzymes  Recent Labs   Lab 05/08/22  0456   LIPASE 135     Coagulation Profile  No lab results found in last 7 days.    IMAGING:  No results found for this or any previous visit (from the past 24 hour(s)).

## 2022-05-12 ENCOUNTER — APPOINTMENT (OUTPATIENT)
Dept: PHYSICAL THERAPY | Facility: CLINIC | Age: 27
End: 2022-05-12
Attending: STUDENT IN AN ORGANIZED HEALTH CARE EDUCATION/TRAINING PROGRAM
Payer: COMMERCIAL

## 2022-05-12 ENCOUNTER — APPOINTMENT (OUTPATIENT)
Dept: GENERAL RADIOLOGY | Facility: CLINIC | Age: 27
End: 2022-05-12
Attending: PHYSICIAN ASSISTANT
Payer: COMMERCIAL

## 2022-05-12 ENCOUNTER — APPOINTMENT (OUTPATIENT)
Dept: CT IMAGING | Facility: CLINIC | Age: 27
End: 2022-05-12
Attending: SURGERY
Payer: COMMERCIAL

## 2022-05-12 ENCOUNTER — APPOINTMENT (OUTPATIENT)
Dept: SPEECH THERAPY | Facility: CLINIC | Age: 27
End: 2022-05-12
Payer: COMMERCIAL

## 2022-05-12 LAB
ABO/RH(D): NORMAL
ANION GAP SERPL CALCULATED.3IONS-SCNC: 6 MMOL/L (ref 3–14)
ANTIBODY SCREEN: NEGATIVE
BACTERIA ABSC ANAEROBE+AEROBE CULT: ABNORMAL
BACTERIA ABSC ANAEROBE+AEROBE CULT: ABNORMAL
BACTERIA TISS BX CULT: ABNORMAL
BACTERIA TISS BX CULT: ABNORMAL
BUN SERPL-MCNC: 16 MG/DL (ref 7–30)
CALCIUM SERPL-MCNC: 8.3 MG/DL (ref 8.5–10.1)
CHLORIDE BLD-SCNC: 107 MMOL/L (ref 94–109)
CO2 SERPL-SCNC: 25 MMOL/L (ref 20–32)
CREAT SERPL-MCNC: 0.74 MG/DL (ref 0.66–1.25)
CRP SERPL-MCNC: 120 MG/L (ref 0–8)
ERYTHROCYTE [DISTWIDTH] IN BLOOD BY AUTOMATED COUNT: 14.1 % (ref 10–15)
ERYTHROCYTE [SEDIMENTATION RATE] IN BLOOD BY WESTERGREN METHOD: 91 MM/HR (ref 0–15)
GFR SERPL CREATININE-BSD FRML MDRD: >90 ML/MIN/1.73M2
GLUCOSE BLD-MCNC: 130 MG/DL (ref 70–99)
GLUCOSE BLDC GLUCOMTR-MCNC: 117 MG/DL (ref 70–99)
GLUCOSE BLDC GLUCOMTR-MCNC: 120 MG/DL (ref 70–99)
GLUCOSE BLDC GLUCOMTR-MCNC: 139 MG/DL (ref 70–99)
GLUCOSE BLDC GLUCOMTR-MCNC: 146 MG/DL (ref 70–99)
GLUCOSE BLDC GLUCOMTR-MCNC: 98 MG/DL (ref 70–99)
HCT VFR BLD AUTO: 34.9 % (ref 40–53)
HGB BLD-MCNC: 10.9 G/DL (ref 13.3–17.7)
HOLD SPECIMEN: NORMAL
LACTATE SERPL-SCNC: 1.1 MMOL/L (ref 0.7–2)
MAGNESIUM SERPL-MCNC: 2.2 MG/DL (ref 1.6–2.3)
MCH RBC QN AUTO: 29.6 PG (ref 26.5–33)
MCHC RBC AUTO-ENTMCNC: 31.2 G/DL (ref 31.5–36.5)
MCV RBC AUTO: 95 FL (ref 78–100)
PHOSPHATE SERPL-MCNC: 3.6 MG/DL (ref 2.5–4.5)
PLATELET # BLD AUTO: 344 10E3/UL (ref 150–450)
POTASSIUM BLD-SCNC: 3.8 MMOL/L (ref 3.4–5.3)
RBC # BLD AUTO: 3.68 10E6/UL (ref 4.4–5.9)
SODIUM SERPL-SCNC: 138 MMOL/L (ref 133–144)
SPECIMEN EXPIRATION DATE: NORMAL
WBC # BLD AUTO: 11.3 10E3/UL (ref 4–11)

## 2022-05-12 PROCEDURE — 86901 BLOOD TYPING SEROLOGIC RH(D): CPT | Performed by: PHYSICIAN ASSISTANT

## 2022-05-12 PROCEDURE — 250N000011 HC RX IP 250 OP 636: Performed by: STUDENT IN AN ORGANIZED HEALTH CARE EDUCATION/TRAINING PROGRAM

## 2022-05-12 PROCEDURE — 99232 SBSQ HOSP IP/OBS MODERATE 35: CPT | Performed by: INTERNAL MEDICINE

## 2022-05-12 PROCEDURE — 92611 MOTION FLUOROSCOPY/SWALLOW: CPT | Mod: GN

## 2022-05-12 PROCEDURE — 84100 ASSAY OF PHOSPHORUS: CPT

## 2022-05-12 PROCEDURE — 250N000013 HC RX MED GY IP 250 OP 250 PS 637: Performed by: OTOLARYNGOLOGY

## 2022-05-12 PROCEDURE — 74230 X-RAY XM SWLNG FUNCJ C+: CPT

## 2022-05-12 PROCEDURE — 85652 RBC SED RATE AUTOMATED: CPT

## 2022-05-12 PROCEDURE — 250N000013 HC RX MED GY IP 250 OP 250 PS 637

## 2022-05-12 PROCEDURE — 92526 ORAL FUNCTION THERAPY: CPT | Mod: GN

## 2022-05-12 PROCEDURE — 80048 BASIC METABOLIC PNL TOTAL CA: CPT

## 2022-05-12 PROCEDURE — 71260 CT THORAX DX C+: CPT

## 2022-05-12 PROCEDURE — 83605 ASSAY OF LACTIC ACID: CPT | Performed by: STUDENT IN AN ORGANIZED HEALTH CARE EDUCATION/TRAINING PROGRAM

## 2022-05-12 PROCEDURE — 70491 CT SOFT TISSUE NECK W/DYE: CPT

## 2022-05-12 PROCEDURE — 85027 COMPLETE CBC AUTOMATED: CPT

## 2022-05-12 PROCEDURE — 250N000013 HC RX MED GY IP 250 OP 250 PS 637: Performed by: STUDENT IN AN ORGANIZED HEALTH CARE EDUCATION/TRAINING PROGRAM

## 2022-05-12 PROCEDURE — 36415 COLL VENOUS BLD VENIPUNCTURE: CPT | Performed by: STUDENT IN AN ORGANIZED HEALTH CARE EDUCATION/TRAINING PROGRAM

## 2022-05-12 PROCEDURE — 250N000011 HC RX IP 250 OP 636

## 2022-05-12 PROCEDURE — 97161 PT EVAL LOW COMPLEX 20 MIN: CPT | Mod: GP

## 2022-05-12 PROCEDURE — 71260 CT THORAX DX C+: CPT | Mod: 26 | Performed by: RADIOLOGY

## 2022-05-12 PROCEDURE — 250N000009 HC RX 250: Performed by: STUDENT IN AN ORGANIZED HEALTH CARE EDUCATION/TRAINING PROGRAM

## 2022-05-12 PROCEDURE — 74230 X-RAY XM SWLNG FUNCJ C+: CPT | Mod: 26 | Performed by: RADIOLOGY

## 2022-05-12 PROCEDURE — 70491 CT SOFT TISSUE NECK W/DYE: CPT | Mod: 26 | Performed by: RADIOLOGY

## 2022-05-12 PROCEDURE — 83735 ASSAY OF MAGNESIUM: CPT

## 2022-05-12 PROCEDURE — 258N000003 HC RX IP 258 OP 636

## 2022-05-12 PROCEDURE — 120N000002 HC R&B MED SURG/OB UMMC

## 2022-05-12 PROCEDURE — 86140 C-REACTIVE PROTEIN: CPT

## 2022-05-12 PROCEDURE — 97530 THERAPEUTIC ACTIVITIES: CPT | Mod: GP

## 2022-05-12 RX ORDER — IOPAMIDOL 755 MG/ML
100 INJECTION, SOLUTION INTRAVASCULAR ONCE
Status: COMPLETED | OUTPATIENT
Start: 2022-05-12 | End: 2022-05-12

## 2022-05-12 RX ORDER — SODIUM CHLORIDE, SODIUM LACTATE, POTASSIUM CHLORIDE, CALCIUM CHLORIDE 600; 310; 30; 20 MG/100ML; MG/100ML; MG/100ML; MG/100ML
INJECTION, SOLUTION INTRAVENOUS CONTINUOUS
Status: DISCONTINUED | OUTPATIENT
Start: 2022-05-13 | End: 2022-05-14

## 2022-05-12 RX ORDER — POTASSIUM CHLORIDE 1.5 G/1.58G
20 POWDER, FOR SOLUTION ORAL ONCE
Status: COMPLETED | OUTPATIENT
Start: 2022-05-12 | End: 2022-05-12

## 2022-05-12 RX ADMIN — SODIUM CHLORIDE, POTASSIUM CHLORIDE, SODIUM LACTATE AND CALCIUM CHLORIDE: 600; 310; 30; 20 INJECTION, SOLUTION INTRAVENOUS at 23:50

## 2022-05-12 RX ADMIN — Medication 2 PACKET: at 08:29

## 2022-05-12 RX ADMIN — INSULIN ASPART 1 UNITS: 100 INJECTION, SOLUTION INTRAVENOUS; SUBCUTANEOUS at 03:29

## 2022-05-12 RX ADMIN — POTASSIUM CHLORIDE 20 MEQ: 1.5 POWDER, FOR SOLUTION ORAL at 08:28

## 2022-05-12 RX ADMIN — SENNOSIDES AND DOCUSATE SODIUM 2 TABLET: 8.6; 5 TABLET ORAL at 08:28

## 2022-05-12 RX ADMIN — PIPERACILLIN AND TAZOBACTAM 3.38 G: 3; .375 INJECTION, POWDER, FOR SOLUTION INTRAVENOUS at 03:16

## 2022-05-12 RX ADMIN — ACETAMINOPHEN 325MG 650 MG: 325 TABLET ORAL at 03:16

## 2022-05-12 RX ADMIN — Medication 1 PACKET: at 19:32

## 2022-05-12 RX ADMIN — Medication 1 PACKET: at 11:59

## 2022-05-12 RX ADMIN — IOPAMIDOL 100 ML: 755 INJECTION, SOLUTION INTRAVENOUS at 11:43

## 2022-05-12 RX ADMIN — PIPERACILLIN AND TAZOBACTAM 3.38 G: 3; .375 INJECTION, POWDER, FOR SOLUTION INTRAVENOUS at 21:37

## 2022-05-12 RX ADMIN — Medication 1 PACKET: at 08:29

## 2022-05-12 RX ADMIN — Medication 1 PACKET: at 16:34

## 2022-05-12 RX ADMIN — ACETAMINOPHEN 325MG 650 MG: 325 TABLET ORAL at 18:55

## 2022-05-12 RX ADMIN — HYOSCYAMINE SULFATE: 16 SOLUTION at 09:01

## 2022-05-12 RX ADMIN — SODIUM CHLORIDE, PRESERVATIVE FREE 83 ML: 5 INJECTION INTRAVENOUS at 11:43

## 2022-05-12 RX ADMIN — FLUCONAZOLE 400 MG: 2 INJECTION INTRAVENOUS at 13:45

## 2022-05-12 RX ADMIN — PIPERACILLIN AND TAZOBACTAM 3.38 G: 3; .375 INJECTION, POWDER, FOR SOLUTION INTRAVENOUS at 16:33

## 2022-05-12 RX ADMIN — PIPERACILLIN AND TAZOBACTAM 3.38 G: 3; .375 INJECTION, POWDER, FOR SOLUTION INTRAVENOUS at 09:00

## 2022-05-12 RX ADMIN — ACETAMINOPHEN 325MG 650 MG: 325 TABLET ORAL at 23:50

## 2022-05-12 ASSESSMENT — ACTIVITIES OF DAILY LIVING (ADL)
ADLS_ACUITY_SCORE: 22

## 2022-05-12 NOTE — PROGRESS NOTES
Attestation:  I have reviewed and agree with the following assessment and intervention.    Sherri Miles RD, LD  Pager: [phone]  -------------------------------    CLINICAL NUTRITION SERVICES - BRIEF NOTE     Nutrition Prescription    RECOMMENDATIONS FOR MDs/PROVIDERS TO ORDER:  Fluid adjustments per team.  Recommended water flushes to meet hydration needs: 60 mL of H20 before and after each feeding plus additional 150 mL H2O TID (TF + FWF = total of 2194 mL free water daily, or ~25 mL/kg).     Recommendations already ordered by Registered Dietitian (RD):    Enteral nutrition: Osmolite 1.5 transition to bolus:   -Stop continuous TF for 1-2 hrs to let stomach empty prior to starting gravity feeding.    -Separate each bolus by 3-4 hrs. Do not give feedings at night while pt asleep (during the day only).  -Begin 1st gravity feeding @ 0.5 cans (~120 mL). If tolerates after approx 4 hours without GI complaints, rec adv each subsequent bolus by 0.5 cans (~120 mL) every 4 hours until reach goal regimen 2 cans TID + 1 additional can (total 7 cans daily)  Suggested schedule - 8AM :2 cans, 12PM: 2 cans, 4PM: 2 cans, 8PM: 1 can          - 1 packet Nutrisource fiber QID  -->Goal Regimen provides: 2489 kcals (28 kcal/kg), 105 g protein (1.2 g pro/kg) daily    Future/Additional Recommendations:  Monitor for tolerance  Follow for SLP recs pending video swallow study today - offer supplements as appropriate if/when diet advanced  Consider 1-2 ProSource Pkts daily if remains NPO - current bolus regimen provides lower-end protein     EVALUATION OF THE PROGRESS TOWARD GOALS   Diet: NPO    Nutrition Support:   Currently on Osmolite 1.5 at 65 ml/hr (1560 ml/day) to provide: 2340 kcals, 98 g PRO, 1189 ml free H20, 318 g CHO, and 0 g fiber daily.   Modular: 2 packet Prosource modular BID for total provisions of 2580 Kcals (29 Kcal/kg), 142 gm pro (1.6 gm/kg)     Per provider request, will transition to bolus feeds as ordered above.        INTERVENTIONS  Enteral nutrition - Modify as ordered above per ENT request    Monitoring/Evaluation  Progress toward goals will be monitored and evaluated per protocol.     Gayle Delgadillo  Dietetic Intern

## 2022-05-12 NOTE — PROGRESS NOTES
[facility] Service: Follow-up Note      Patient:  Robert Gallo, Date of birth 1995, Medical record number [id number]  Date of Visit:  May 9, 2022  Reason for consult: necrotizing neck infection         Assessment and Recommendations:     ID Problem list:  1. Necrotizing soft tissue infection of neck and mediastinum  2. S/p neck and mediastinum debridement 5/4/22 by ENT and thoracic surgery  3. Polymicrobial infection with prelim Strep anginosus, Staph epidermidis, Prevotella, Carlyn dubliniensis  4. Fevers  5. Dental caries, s/p tooth extractions 5/4  6. Methamphetamine use, inhaled    Recs:  1. Consider narrowing to Unasyn, all identified bacteria susceptible  2. Continue fluconazole  3. Concern for para-esophageal abscess, suspect mass effect is contributing to swallowing difficulties and ongoing fevers    Discussion:  Robert is a 26M with PMH including dental caries who was admitted 5/4 due to necrotizing soft tissue infection of the neck and mediastinum, now s/p debridement by ENT and thoracic surgery 5/4 and s/p VATS/chest tubes and PEG and also extraction of multiple teeth. His OR cultures have grown polymicrobial bacteria thus far including Strep anginosus, Staph epidermidis, and Prevotella, and candida. Currently on Zosyn and fluconazole. He has ongoing fevers, difficulty swallowing and a known para-esophageal fluid collection. Awaiting formal radiology read but appears slightly larger with enhancing rim.    Thank you for allowing us to participate in the care of this patient. ID will continue to follow.    Discussed with Dr. Clayton.    Yg Douglas MD  Infectious Disease Fellow           Interval History:     Nursing notes reviewed. Continues to have fevers but feeling well. Sitting up in chair. Having more difficulty with swallowing and managing secretions but in general feels gradually improving.         Review of Systems:     8 point ROS negative unless noted above         Current  Antimicrobials     IV Zosyn  Fluconazole         Physical Exam:   Ranges for vital signs:  Temp:  [99.5  F (37.5  C)-101.9  F (38.8  C)] 100.6  F (38.1  C)  Pulse:  [] 98  Resp:  [14-20] 20  BP: (122-136)/(66-80) 123/66  SpO2:  [93 %-99 %] 98 %    Exam:  GENERAL:  well-developed, well-nourished, sitting in bed in no acute distress.   ENT/NECK:  Head is normocephalic, atraumatic. Extubated and communicative. +surgical wounds down left neck to upper chest with serosanguinous fluid on dressings.   LUNGS:  Breathing comfortably on room air. Basilar crackles  CARDIOVASCULAR:  Regular rate and rhythm. No murmurs.   ABDOMEN:  Non-distended, soft, nontender.  EXT: Extremities warm and well perfused.  SKIN:  No acute rashes on extremities.   NEUROLOGIC:  Awake and orientated  LINES: PIVs without erythema or drainage         Laboratory Data:       Inflammatory Markers    Recent Labs   Lab Test 05/12/22  0418 05/11/22  0345 05/10/22  0557 05/09/22  0730 05/08/22  0456 05/07/22  0454 05/06/22  0344 05/05/22  0353   SED 91* 98* 96* 96* 101* 113* 119* 118*   .0* 150.0* 150.0* 160.0* 140.0* 160.0* 280.0* 440.0*       Hematology Studies    Recent Labs   Lab Test 05/12/22  0418 05/11/22  0345 05/10/22  0557 05/09/22 2210 05/09/22  0730 05/08/22  0456   WBC 11.3* 9.2 8.3 8.1 9.3 9.7   HGB 10.9* 10.2* 10.2* 10.2* 9.7* 9.6*   MCV 95 97 95 95 95 94    328 328 333 330 340       Metabolic Studies     Recent Labs   Lab Test 05/12/22  0418 05/11/22  0345 05/10/22  0557 05/09/22  2210 05/09/22  0730    141 142 144 145*   POTASSIUM 3.8 3.6 3.4 3.6 3.8  3.8   CHLORIDE 107 110* 109 111* 110*   CO2 25 26 26 27 30   BUN 16 16 18 21 22   CR 0.74 0.78 0.73 0.85 0.78   GFRESTIMATED >90 >90 >90 >90 >90       Hepatic Studies    Recent Labs   Lab Test 05/09/22  0730 05/08/22  0456 05/04/22  0557   BILITOTAL 0.6 0.6 1.5*   ALKPHOS 164* 185* 93   ALBUMIN 1.9* 1.8* 2.0*   AST 75* 120* 32   * 126* 26        Microbiology:  Culture   Date Value Ref Range Status   2022 No growth after 2 days  Preliminary   2022 No growth after 2 days  Preliminary   2022 No growth after 4 days  Preliminary   2022 No growth after 4 days  Preliminary   2022 2+ Mixed Aerobic and Anaerobic roslyn  Final   2022 1+ Normal roslyn  Final   2022 4+ Carlyn dubliniensis (A)  Final     Comment:     Susceptibilities not routinely done   2022 3+ Carlyn dubliniensis (A)  Preliminary   2022 1+ Carlyn dubliniensis (A)  Final     Comment:     Susceptibilities not routinely done   2022 No Growth  Final   2022 No Growth  Final   2022 1+ Prevotella species (A)  Final     Comment:     Susceptibilities not routinely done   2022 2+ Mixed Aerobic and Anaerobic roslyn  Final   2022 Carlyn dubliniensis (A)  Preliminary   2022 1+ Streptococcus anginosus (A)  Final     Comment:     This organism is susceptible to ampicillin, penicillin, vancomycin and the cephalosporins. If treatment is required and your patient is allergic to penicillin, contact the microbiology lab within 5 days to request susceptibility testing.   2022 1+ Normal roslyn  Final   2022 2+ Prevotella species (A)  Final     Comment:     Susceptibilities not routinely done   2022 2+ Prevotella species (A)  Final     Comment:     Susceptibilities not routinely done   2022 4+ Mixed Aerobic and Anaerobic roslyn  Final   2022 No growth after 8 days  Preliminary   2022 1+ Streptococcus anginosus (A)  Final     Comment:     This organism is susceptible to ampicillin, penicillin, vancomycin and the cephalosporins. If treatment is required and your patient is allergic to penicillin, contact the microbiology lab within 5 days to request susceptibility testing.   2022 1+ Staphylococcus epidermidis (A)  Corrected              Imagin/6/22 CT read:  IMPRESSION:  1.  CT of  the head demonstrates no acute cranial abnormality. No  abnormal intracranial contrast enhancing lesions.   2.  CT of the neck shows postsurgical changes of anterior median  sternotomy and bilateral incision and drainage of cervical  parapharyngeal spaces for necrotizing infection.  Large open wound  extends over the anterior cervical soft tissues with diffuse  heterogeneous edema of multiple neck spaces as described in great  detail above sparing the danger space. No definite residual  rim-enhancing collection however, the edematous collections could  represent developing phlegmon.   3.  No definite flow seen in the left mid and inferior internal  jugular vein, compatible with thrombus. 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  4.  Diffuse dental caries, left maxillary periapical dental disease  and recent left mandibular dental extractions.    5/9/22 CT read:  Impression:  1.  Postsurgical changes of neck wound washout with large anterior  open neck wound; new left sublingual space washout with drain place.   2.  Interval changes of consolidating phlegmons and evolving abscesses  throughout the neck involving the sublingual, ,  submandibular, and bilateral parapharyngeal spaces, and  retrosternal/retromanubrial region.  3.  Increased conspicuity of rim enhancing fluid collection along the  left upper thoracic esophagus, possibly representing paraesophageal  versus mucosal abscess. Cannot definitely exclude upper esophageal  injury.  4.  Upper lung patchy dependent consolidative and groundglass airspace  opacities; atelectasis versus infection.  5.  Loculated bilateral pleural effusions.

## 2022-05-12 NOTE — PROGRESS NOTES
ORAL & MAXILLOFACIAL SURGERY   PROGRESS NOTE  Robert Gallo,  MRN: [id number],  : 1995           ASSESSMENT:  26 year old male with no significant past medical history and necrotizing fasciitis of the neck with mediastinal involvement s/p I&D of bilateral parapharyngeal spaces, sublingual space, bilateral necks, and extraction of teeth #19 and 20 by ENT in addition to bilateral VATS and PEG tube placement by Thoracic Surgery on 2022. Currently, Robert is planned to return to OR on 2022 following new intermittent fever and fluid collection of the left paraesophageal space for further neck exploration with ENT.      Upon clinical exam by OMFS, Robert has generalized poor dentition with rampant decay, exposed bone at lingual posterior mandible with sharp osseous projections and expected post surgical changes at site #19 and 20. No intraoral purulence noted. Given the pts likely long hospital admission, it is reasonable for Adult Dentistry to consult for more comprehensive oral evaluation/treatment planning now that he has been transferred to acute care. OMFS is willing to aid in extraction care if needed following adult dentistry consult.       PLAN/RECCOMENDATIONS:  - Consult adult dentistry for comprehensive oral evaluation to determine teeth indicated for extraction.   - OMFS to continue to follow peripherally and will aid in extraction care if needed/indicated       Discussed with chief resident and staff.    Danitza Carrion DDS  Oral & Maxillofacial Surgery Intern/Dental Fellow      Please contact the OMFS resident on-call with questions or concerns.  ____________________________________      SUBJECTIVE:  NAEO. Pt is overall feeling better      PHYSICAL EXAM:   GEN: male, NAD  HEAD: NC  MAXILLOFACIAL: NC/AT, mild left mandibular swelling with interval improvment, generalized ari carious lesions, sharp exposed bone a left lingual posterior mandible in area of recent extractions with mild gingival  erythema, no purulent drainage noted intraorally       LABS:   CBC RESULTS: Recent Labs   Lab Test 05/12/22  0418   WBC 11.3*   RBC 3.68*   HGB 10.9*   HCT 34.9*   MCV 95   MCH 29.6   MCHC 31.2*   RDW 14.1         Last Comprehensive Metabolic Panel:  Sodium   Date Value Ref Range Status   05/12/2022 138 133 - 144 mmol/L Final     Potassium   Date Value Ref Range Status   05/12/2022 3.8 3.4 - 5.3 mmol/L Final     Chloride   Date Value Ref Range Status   05/12/2022 107 94 - 109 mmol/L Final     Carbon Dioxide (CO2)   Date Value Ref Range Status   05/12/2022 25 20 - 32 mmol/L Final     Anion Gap   Date Value Ref Range Status   05/12/2022 6 3 - 14 mmol/L Final     Glucose   Date Value Ref Range Status   05/12/2022 130 (H) 70 - 99 mg/dL Final     GLUCOSE BY METER POCT   Date Value Ref Range Status   05/12/2022 146 (H) 70 - 99 mg/dL Final     Urea Nitrogen   Date Value Ref Range Status   05/12/2022 16 7 - 30 mg/dL Final     Creatinine   Date Value Ref Range Status   05/12/2022 0.74 0.66 - 1.25 mg/dL Final     GFR Estimate   Date Value Ref Range Status   05/12/2022 >90 >60 mL/min/1.73m2 Final     Comment:     Effective December 21, 2021 eGFRcr in adults is calculated using the 2021 CKD-EPI creatinine equation which includes age and gender (Danelle shepard al., NEJM, DOI: 10.1056/DLIYno0276608)     Calcium   Date Value Ref Range Status   05/12/2022 8.3 (L) 8.5 - 10.1 mg/dL Final

## 2022-05-12 NOTE — PLAN OF CARE
Major Shift Events:  Patient A&O x4. PERRL. Ambulating in room and halls with therapy. BP's WNL. On RA with copious oral secretions. Patient able to suction himself. Swallow study performed and mildly thick liquid diet ordered per speech. Repeat CT performed due to concern for increase in abscess. Continuous tube feeds discontinued, bolus feeds initiated. Patient voiding spontaneously using the urinal.      Plan: Plan for surgery tomorrow. Transferred to  for continued monitoring and treatment.     For vital signs and complete assessments, please see documentation flowsheets.

## 2022-05-12 NOTE — PROGRESS NOTES
05/12/22 0800   Quick Adds   Type of Visit Initial PT Evaluation   Living Environment   People in Home alone   Current Living Arrangements apartment   Home Accessibility stairs to enter home   Number of Stairs, Main Entrance 4;greater than 10 stairs   Stair Railings, Main Entrance railings safe and in good condition   Transportation Anticipated family or friend will provide   Living Environment Comments Pt lives in apartment in [place], 3-4 [address] building and 1 flight to second floor apartment. No concerns once inside the apartment   Self-Care   Usual Activity Tolerance good   Current Activity Tolerance good   Regular Exercise Yes   Activity/Exercise Type walking;other (see comments)  (working)   Exercise Amount/Frequency daily   Equipment Currently Used at Home none   Fall history within last six months no   Activity/Exercise/Self-Care Comment Pt was previously IND w/ mobility and ADLs prior to admission. States that he does not drive, but works overnights as  in grocery store.   General Information   Onset of Illness/Injury or Date of Surgery 05/04/22   Patient/Family Therapy Goals Statement (PT) Return home   Pertinent History of Current Problem (include personal factors and/or comorbidities that impact the POC) Robert Gallo is a 26 year old with no significant PMHx who was admitted from [facility] with necrotizing fasciitis of neck with mediastinal involvement s/p emergent bilateral VATS, right anterior mediastinotomy, I&D of bilateral pharyngeal abscess, and PEG tube placement 5/4 with Thoracic and ENT.   Existing Precautions/Restrictions lifting   Cognition   Orientation Status (Cognition) oriented x 4   Safety Deficit (Cognition) minimal deficit;impulsivity   Pain Assessment   Patient Currently in Pain No   Posture    Posture Forward head position   Range of Motion (ROM)   ROM Comment BLE WFL   Strength (Manual Muscle Testing)   Strength Comments BLE WFL   Bed Mobility   Comment, (Bed Mobility) Supine>sit  IND w/o bed rail   Transfers   Comment, (Transfers) Sit<>stand IND w/o AD   Gait/Stairs (Locomotion)   Comment, (Gait/Stairs) Amb 500 ft w/o AD, IND, slow deric but pt states that this is his baseline. Asc/dsc 13 stairs w/o railing support, reciprocal pattern   Balance   Balance Comments DGI completed this session, see care plan note for details   Sensory Examination   Sensory Perception patient reports no sensory changes   Clinical Impression   Criteria for Skilled Therapeutic Intervention Yes, treatment indicated   PT Diagnosis (PT) Impaired functional mobility   Influenced by the following impairments balance deficits due to impaired cervical ROM, medical status   Functional limitations due to impairments ambulation, stairs   Clinical Presentation (PT Evaluation Complexity) Stable/Uncomplicated   Clinical Presentation Rationale clinical judgement   Clinical Decision Making (Complexity) low complexity   Planned Therapy Interventions (PT) balance training;home exercise program;neuromuscular re-education;patient/family education;stair training;strengthening;transfer training;progressive activity/exercise;risk factor education;home program guidelines   Risk & Benefits of therapy have been explained evaluation/treatment results reviewed;care plan/treatment goals reviewed;risks/benefits reviewed;current/potential barriers reviewed;participants voiced agreement with care plan;participants included;patient   PT Discharge Planning   PT Discharge Recommendation (DC Rec) home with assist   PT Rationale for DC Rec Pt very IND at baseline, progressing well w/ therapies and is near functional baseline. Anticipate safe return home once medically stable per MD.   PT Brief overview of current status SBA transfers and amb, amb in halls w/ nursing/therapies 2-3x/day   Plan of Care Review   Plan of Care Reviewed With patient   Total Evaluation Time   Total Evaluation Time (Minutes) 8   Physical Therapy Goals   PT Frequency One  time eval and treatment only   PT Predicted Duration/Target Date for Goal Attainment 05/12/22   PT Goals Bed Mobility;Transfers;Gait;Stairs   PT: Bed Mobility Independent;Supine to/from sit;Rolling;Within precautions;Goal Met   PT: Transfers Independent;Sit to/from stand;Bed to/from chair;Within precautions;Goal Met   PT: Gait Independent;Greater than 200 feet;Goal Met   PT: Stairs Independent;Greater than 10 stairs;Goal Met   Psychosocial Support   Trust Relationship/Rapport care explained;choices provided;emotional support provided;empathic listening provided;questions answered;questions encouraged;reassurance provided;thoughts/feelings acknowledged

## 2022-05-12 NOTE — PROGRESS NOTES
"Thoracic Surgery  Progress Note    S: Intermittently febrile, no new complaints, breathing well, pain controlled.    O:  /68 (BP Location: Left arm)   Pulse 95   Temp 99.5  F (37.5  C) (Oral)   Resp 16   Ht 1.9 m (6' 2.8\")   Wt 98.4 kg (216 lb 14.9 oz)   SpO2 96%   BMI 27.26 kg/m      Physical Exam:  Gen: NAD, laying in bed  Pulm: normal resp effort on room air  CV: NSR on monitor  Extr: wwp, no edema  Wound: large neck wound and chest wound w/no active drainage    I&O:  I/O last 3 completed shifts:  In: 3620 [I.V.:680; NG/GT:1380]  Out: 925 [Urine:925]      Labs and imaging:  Labs- reviewed    A: Robert Gallo is a 26 year old with no significant PMHx who was admitted from [facility] [place] with necrotizing fasciitis of neck with mediastinal involvement s/p emergent bilateral VATS, right anterior mediastinotomy, I&D of bilateral pharyngeal abscess, and PEG tube placement 5/4 with Thoracic and ENT.     - persistent fevers despite abx, CT neck an chest with IV contrast ordered to assess paraesophageal collection  - may require return to OR to drain fluid collection pending findings from above imaging  - will hold therapeutic lovenox until CT done in case needs to go to OR  - rest of cares per primary team  - please call with questions    Signed:    Conrado Castro MD 5/12/2022 at 8:29 AM  Cardiothoracic Surgery Fellow  Pager: [phone]          "

## 2022-05-12 NOTE — PROGRESS NOTES
Dynamic Gait Index (DGI):The DGI is a measure of balance during gait that is reliable and valid for the elderly and individuals with Parkinson's disease, MS, vestibular disorders, or s/p stroke. Gait assistive device used: None    Scores ?19 /24 indicate an increased risk for falls according to Dorina et al 2000  Minimal Detectable Change = 2.9 in community dwelling elderly according to Nikolay et al 2011    Assessment (rationale for performing, application to patient s function & care plan): Pt displays no falls risk at this time and I anticipate safe return home once medically stable per MD. Only limitation noted during gait w/ vertical head turns due to incisional pain.  (Minutes billed as physical performance test)       05/12/22 0800   Signing Clinician's Name / Credentials   Signing clinician's name / credentials MARCELLE Jorge   Dynamic Gait Index (Blas and Galdamez Janet, 1995)   Gait Level Surface 3   Change in Gait Speed 3   Gait and Horizontal Head Turns 3   Gait with Vertical Head Turns 2   Gait and Pivot Turns 3   Step Over Obstacle 3   Step Around Obstacles 3   Steps 3   Total Dynamic Gait Index Score  (A score of 19 or less has been correlated to an increased risk of falls in community dwelling older adults, patients with vestibular disorders, and patients with MS.)   Total Score (out of 24) 23

## 2022-05-12 NOTE — PROGRESS NOTES
"ENT Progress Note  5/11/2022    S: Tm 101.9, Tc 99.5. HR , RR wnl  Has his skateboarding shoes with him today which he feels he will be able to walk better in. Was up in the unit yesterday but shoes were too slippery.     O  /68 (BP Location: Left arm)   Pulse 95   Temp 99.5  F (37.5  C) (Oral)   Resp 16   Ht 1.9 m (6' 2.8\")   Wt 98.4 kg (216 lb 14.9 oz)   SpO2 94%   BMI 27.26 kg/m     GENERAL: Extubated, comfortable   HEENT: Q tip probed deep into parapharyngeal space - no purulence, submental incision healing with no drainage. neck loosely packed with kerlix, granulation tissue forming on strap muscles, no pus, Q tip and finger probed deep into left paratracheal space - no purulence,   Residual dentition poor. Dakins-soaked dressings changed.    LABS  WBC 11.3 (9.2)   (150)  ESR 91 (96)    IMAGING - CXR 5/11  1. Stable to minimal improved streaky bibasilar opacities may  represent atelectasis or infection.  2. Trace bilateral pleural effusions are also present.    Assessment and Plan  Robert Gallo is a 26 year old male with no significant past medical history with necrotizing fasciitis of the neck with mediastinal involvement s/p I&D of bilateral parapharyngeal spaces, sublingual space, bilateral necks, and extraction of teeth #19 and 20 and bilateral VATS and PEG tube placement by Thoracic Surgery on 5/4/2022. Continuing to complete dressing changes.    Neuro:  - tolerated his dressing changes and probing without supplemental meds  - pain seems reasonably controlled  - Possible chemical dependency consult will discuss with staff  HEENT:  - Oral surgery: no further extractions currently, appreciate input. OMFS will discuss w/ adult dental  - CT ordered by Thoracic Surgery  - Twice daily packing changes to the neck with Dakins soaked Kerlix to be performed by ENT  Respiratory:  - room air   CV/heme:  - hemodynamically stable without pressors, some persistent tachycardia   FEN/GI:  - " Status post PEG tube placement by Thoracic Surgery  - Bolus feed orders placed by nutrition  - XR VSS scheduled for today with SLP  :  - voiding   Endo  -ISS   Heme/ID:  - ID - fluconazole, Zosyn  - Trend CBC, CRP, and ESR  - therapeutic lovenox - will follow duration with possible heme discussion  - CT neck w/ IV 5/9  PPX:  - lovenox as above  - SCDs  CONSULTS  - Chemical dependency consult, will discuss with staff  -ID  -SLP     --Patient and plan to be discussed with Dr. Avila-Justus Dasilva PA-C  Otolaryngology-Head & Neck Surgery   Please contact ENT by dialing [phone] and entering job code [phone].

## 2022-05-12 NOTE — PROGRESS NOTES
05/12/22 1343   General Information   Onset of Illness/Injury or Date of Surgery 05/04/22   Referring Physician Zenobia Urena PA-C   Patient/Family Therapy Goal Statement (SLP) None stated   Pertinent History of Current Problem Robert Gallo is a 26 year old male with no significant past medical history with necrotizing fasciitis of the neck with mediastinal involvement s/p I&D of bilateral parapharyngeal spaces, sublingual space, bilateral necks, and extraction of teeth #19 and 20 and bilateral VATS and PEG tube placement by Thoracic Surgery on 5/4/2022. Clinical status today concerning for persistent/worsening infection given fever, tachycardia. Infections of head and neck continue to appear improved, will discuss with SICU and ID possibility of other infection sites. Per ENT, there is no concern for leak, ok to trial up to soft solid textures. Pt with overt s/sx of aspiration across PO textures. Video swallow study completed per MD orders to further assess oropharyngeal swallow function.   Type of Evaluation   Type of Evaluation Swallow Evaluation   General Swallowing Observations   Swallowing Evaluation Videofluoroscopic swallow study (VFSS)   VFSS Evaluation   Radiologist [facility] resident   Views Taken left lateral   Physical Location of Procedure radiology suite #5   VFSS Textures Trialed thin liquids;mildly thick liquids;pureed;minced & moist   VFSS Eval: Thin Liquid Texture Trial   Mode of Presentation, Thin Liquid cup;spoon;self-fed   Order of Presentation 1, 7, 8, 17, 18   Preparatory Phase WFL   Oral Phase, Thin Liquid WFL   Pharyngeal Phase, Thin Liquid Delayed swallow reflex;Residue in valleculae;Residue in pyriform sinus;UES dysfunction   Rosenbek's Penetration Aspiration Scale: Thin Liquid Trial Results 7 - contrast passes glottis, visible subglottic residue remains despite patient's response (aspiration)       Response to Aspiration unproductive reflexive involuntary cough/throat clear    Successful Strategies Trialed During Procedure, Thin Liquid   (supraglottic swallow reduced volume of penetration, however pt with ongoing airway invasion after the swallow with aspiration noted x1.)   Diagnostic Statement Thin liquids via spoon resulted in ari, aspiration with weak reflexive throat clear. Supraglottic swallow strategy reduced volume of penetration, however pt with ongoing airway invasion after the swallow with aspiration noted x1.   VFSS Eval: Mildly Thick Liquids   Mode of Presentation spoon;cup;self-fed   Order of Presentation 2, 3, 4, 5, 6, 10, 12, 14, 15, 16   Preparatory Phase WFL   Oral Phase WFL   Pharyngeal Phase Delayed swallow reflex;Residue in valleculae;Residue in pyriform sinus;UES dysfunction   Rosenbek's Penetration Aspiration Scale 7 - contrast passes glottis, visible subglottic residue remains despite patient's response (aspiration)   Response to Aspiration unproductive reflexive involuntary cough/throat clear   Successful Strategies Trialed During Procedure other (see comments)  (pt able to effectively clear penetrated/aspiration residuals with supraglottic swallow strategy)   Diagnostic Statement Mildly thick liquids via cup resulted in ari, aspiration with weak reflexive throat clear. Supraglottic swallow strategy (swallow, throat clear/cough, swallow), effectively cleared penetrated residuals from vestibule. Mild pharyngeal residuals in valleculae and pyriforms.   VFSS Evaluation: Puree Solid Texture Trial   Mode of Presentation, Puree spoon;self-fed   Order of Presentation 9, 11   Preparatory Phase WFL   Oral Phase, Puree WFL   Pharyngeal Phase, Puree Delayed swallow reflex;Residue in valleculae;Residue in pyriform sinus;UES dysfunction   Rosenbek's Penetration Aspiration Scale: Puree Food Trial Results 2 - contrast enters airway, remains above the vocal cords, no residue remains (penetration)   Diagnostic Statement Pureed textures via spoon resulted in penetration x1.  Pt with mild-moderate pharyngeal residuals in valleculae and pyriforms which pt was able to partially clear with liquid wash and extra swallows.   VFSS Eval: Minced & Moist    Mode of Presentation spoon;self-fed   Order of Presentation 13   Preparatory Phase WFL   Oral Phase WFL   Pharyngeal Phase WFL;Residue in valleculae;Residue in pyriform sinus;UES dysfunction   Rosenbek's Penetration Aspiration Scale 1 - no aspiration, contrast does not enter airway   Diagnostic Statement No aspiration or penetration. Moderate pharyngeal residuals which pt was able to partially clear with liquid wash and extra swallows.   Swallowing Recommendations   Diet Consistency Recommendations full liquid diet;mildly thick liquids (level 2)  (with supraglottic swallow strategy)   Supervision Level for Intake distant supervision needed   Mode of Delivery Recommendations bolus size, small;food moistened;slow rate of intake   Swallowing Maneuver Recommendations alternate food and liquid intake;supraglottic swallow   Monitoring/Assistance Required (Eating/Swallowing) monitor for cough or change in vocal quality with intake;stop eating activities when fatigue is present   Recommended Feeding/Eating Techniques (Swallow Eval) maintain upright sitting position for eating;maintain upright posture during/after eating for 30 minutes;minimize distractions during oral intake;provide oral hygiene prior to intake   Medication Administration Recommendations, Swallowing (SLP) continue non-oral routes for meds   Instrumental Assessment Recommendations VFSS (videofluoroscopic swallowing study)  (recommend repeat VFSS in 5-7 days)   General Therapy Interventions   Planned Therapy Interventions Dysphagia Treatment   Dysphagia treatment Compensatory strategies for swallowing;Instruction of safe swallow strategies;Modified diet education;Oropharyngeal exercise training   Clinical Impression   Criteria for Skilled Therapeutic Interventions Met (SLP Eval) Yes,  treatment indicated   SLP Diagnosis moderate-severe oropharyngeal dysphagia   Risks & Benefits of therapy have been explained evaluation/treatment results reviewed;care plan/treatment goals reviewed;risks/benefits reviewed;current/potential barriers reviewed;participants voiced agreement with care plan;participants included;patient   Clinical Impression Comments Video swallow study completed per MD orders. Pt presents with moderate-severe oropharyngeal dysphagia under fluoroscopy. Pt's swallow function characterized by functional oral phase, reduced BOT retraction, reduced hyolaryngeal elevation, reduced laryngeal vestibule closure, incomplete epiglottic inversion, and reduced UES relaxation. These deficits resulted in ari, aspiration of thin and mildly thick liquids. Pt with weak throat clear in response to aspiration. Supraglottic swallow strategy (swallow, throat clear/cough, swallow), minimized amount of penetration with mildly-thick liquids, however pt with persisting airway invasion with thin liquids despite strategy use. Chin tuck strategy and head turn strategy did not improve airway protection. After the swallow, pt with moderate pharyngeal residuals in valleculae and pyriforms which pt was unable to fully clear despite extra swallows and liquid wash. Recommend pt initiate full liquids (mildly thick consistency) with strict use of supraglottic swallow strategy (swallow, throat clear/cough, swallow). Pt should be fully upright for all PO and complete excellent oral cares. Recommend repeat VFSS in 5-7 days pending progress. ST to continue to follow targeting PO tolerance, completion of oropharyngeal exercises, and advancement as appropriate. Anticipate pt will require OP SLP follow-up at discharge targeting dysphagia.   SLP Discharge Planning   SLP Discharge Recommendation home with outpatient speech therapy   SLP Rationale for DC Rec pt well-below baseline oropharyngeal swallowing skills. Recommend repeat  VFSS in 5-7 days   SLP Brief overview of current status  Recommend pt initiate full liquids (mildly thick consistency) with strict use of supraglottic swallow strategy (swallow, throat clear/cough, swallow). Pt should be fully upright for all PO and complete excellent oral cares. Recommend repeat VFSS in 5-7 days pending progress.    Total Evaluation Time   Total Evaluation Time (Minutes) 30   SLP Goals   Therapy Frequency (SLP Eval) 5 times/wk   SLP Predicted Duration/Target Date for Goal Attainment 07/08/22   SLP Goals Swallow   SLP: Goal 1 pt will complete 5-10 reps of oropharyngeal strengthing exercises with 90% accuracy and minimal clinician cues

## 2022-05-12 NOTE — PLAN OF CARE
Shift Summary: No acute changes. Tmax 101.9, tylenol given x2. K+ ordered per protocol.    Assessment:  Neuro: A/Ox4, following commands with equal strength in all extremities. PERRL.   Resp: RA, strong cough. Able to suction self.  Cardiac: NSR/ST. BP WDL.  GI: TF at goal of 65 ml/hr through PEG. 357w7gn FWF. No BM.  : Voids with urinal, adequate output.  Skin: Weeping/drainage from neck incision, cleaned as able. Packing remains in place.   Line: PIVx1    For full assessment and vitals, please see flowsheets.    Plan:  Continue to monitor hemodynamic status. Transfer to floor when bed available.    Samantha Hilton RN

## 2022-05-13 ENCOUNTER — ANESTHESIA (OUTPATIENT)
Dept: SURGERY | Facility: CLINIC | Age: 27
End: 2022-05-13
Payer: COMMERCIAL

## 2022-05-13 ENCOUNTER — ANESTHESIA EVENT (OUTPATIENT)
Dept: SURGERY | Facility: CLINIC | Age: 27
End: 2022-05-13
Payer: COMMERCIAL

## 2022-05-13 LAB
ANION GAP SERPL CALCULATED.3IONS-SCNC: 5 MMOL/L (ref 3–14)
BACTERIA ABSC ANAEROBE+AEROBE CULT: ABNORMAL
BACTERIA BLD CULT: NO GROWTH
BACTERIA BLD CULT: NO GROWTH
BUN SERPL-MCNC: 17 MG/DL (ref 7–30)
CALCIUM SERPL-MCNC: 8.8 MG/DL (ref 8.5–10.1)
CHLORIDE BLD-SCNC: 106 MMOL/L (ref 94–109)
CO2 SERPL-SCNC: 26 MMOL/L (ref 20–32)
CREAT SERPL-MCNC: 0.96 MG/DL (ref 0.66–1.25)
CRP SERPL-MCNC: 100 MG/L (ref 0–8)
ERYTHROCYTE [DISTWIDTH] IN BLOOD BY AUTOMATED COUNT: 14.1 % (ref 10–15)
ERYTHROCYTE [SEDIMENTATION RATE] IN BLOOD BY WESTERGREN METHOD: 81 MM/HR (ref 0–15)
GFR SERPL CREATININE-BSD FRML MDRD: >90 ML/MIN/1.73M2
GLUCOSE BLD-MCNC: 104 MG/DL (ref 70–99)
GLUCOSE BLDC GLUCOMTR-MCNC: 100 MG/DL (ref 70–99)
GLUCOSE BLDC GLUCOMTR-MCNC: 103 MG/DL (ref 70–99)
GLUCOSE BLDC GLUCOMTR-MCNC: 106 MG/DL (ref 70–99)
GLUCOSE BLDC GLUCOMTR-MCNC: 106 MG/DL (ref 70–99)
GLUCOSE BLDC GLUCOMTR-MCNC: 139 MG/DL (ref 70–99)
GLUCOSE BLDC GLUCOMTR-MCNC: 165 MG/DL (ref 70–99)
GLUCOSE BLDC GLUCOMTR-MCNC: 95 MG/DL (ref 70–99)
HCT VFR BLD AUTO: 35.6 % (ref 40–53)
HGB BLD-MCNC: 11.1 G/DL (ref 13.3–17.7)
HOLD SPECIMEN: NORMAL
LACTATE SERPL-SCNC: 0.7 MMOL/L (ref 0.7–2)
MAGNESIUM SERPL-MCNC: 2.3 MG/DL (ref 1.6–2.3)
MCH RBC QN AUTO: 29.6 PG (ref 26.5–33)
MCHC RBC AUTO-ENTMCNC: 31.2 G/DL (ref 31.5–36.5)
MCV RBC AUTO: 95 FL (ref 78–100)
PHOSPHATE SERPL-MCNC: 4.4 MG/DL (ref 2.5–4.5)
PLATELET # BLD AUTO: 410 10E3/UL (ref 150–450)
POTASSIUM BLD-SCNC: 3.9 MMOL/L (ref 3.4–5.3)
RBC # BLD AUTO: 3.75 10E6/UL (ref 4.4–5.9)
SODIUM SERPL-SCNC: 137 MMOL/L (ref 133–144)
WBC # BLD AUTO: 11.5 10E3/UL (ref 4–11)

## 2022-05-13 PROCEDURE — 250N000011 HC RX IP 250 OP 636

## 2022-05-13 PROCEDURE — 250N000013 HC RX MED GY IP 250 OP 250 PS 637: Performed by: STUDENT IN AN ORGANIZED HEALTH CARE EDUCATION/TRAINING PROGRAM

## 2022-05-13 PROCEDURE — 272N000001 HC OR GENERAL SUPPLY STERILE: Performed by: THORACIC SURGERY (CARDIOTHORACIC VASCULAR SURGERY)

## 2022-05-13 PROCEDURE — 0WJ3XZZ INSPECTION OF ORAL CAVITY AND THROAT, EXTERNAL APPROACH: ICD-10-PCS | Performed by: THORACIC SURGERY (CARDIOTHORACIC VASCULAR SURGERY)

## 2022-05-13 PROCEDURE — 250N000025 HC SEVOFLURANE, PER MIN: Performed by: THORACIC SURGERY (CARDIOTHORACIC VASCULAR SURGERY)

## 2022-05-13 PROCEDURE — 0WJC0ZZ INSPECTION OF MEDIASTINUM, OPEN APPROACH: ICD-10-PCS | Performed by: THORACIC SURGERY (CARDIOTHORACIC VASCULAR SURGERY)

## 2022-05-13 PROCEDURE — 360N000077 HC SURGERY LEVEL 4, PER MIN: Performed by: THORACIC SURGERY (CARDIOTHORACIC VASCULAR SURGERY)

## 2022-05-13 PROCEDURE — 258N000003 HC RX IP 258 OP 636: Performed by: NURSE ANESTHETIST, CERTIFIED REGISTERED

## 2022-05-13 PROCEDURE — 120N000002 HC R&B MED SURG/OB UMMC

## 2022-05-13 PROCEDURE — 84100 ASSAY OF PHOSPHORUS: CPT | Performed by: OTOLARYNGOLOGY

## 2022-05-13 PROCEDURE — 80048 BASIC METABOLIC PNL TOTAL CA: CPT | Performed by: OTOLARYNGOLOGY

## 2022-05-13 PROCEDURE — 250N000013 HC RX MED GY IP 250 OP 250 PS 637: Performed by: THORACIC SURGERY (CARDIOTHORACIC VASCULAR SURGERY)

## 2022-05-13 PROCEDURE — 0WJ60ZZ INSPECTION OF NECK, OPEN APPROACH: ICD-10-PCS | Performed by: THORACIC SURGERY (CARDIOTHORACIC VASCULAR SURGERY)

## 2022-05-13 PROCEDURE — 370N000017 HC ANESTHESIA TECHNICAL FEE, PER MIN: Performed by: THORACIC SURGERY (CARDIOTHORACIC VASCULAR SURGERY)

## 2022-05-13 PROCEDURE — 85027 COMPLETE CBC AUTOMATED: CPT | Performed by: OTOLARYNGOLOGY

## 2022-05-13 PROCEDURE — 250N000011 HC RX IP 250 OP 636: Performed by: STUDENT IN AN ORGANIZED HEALTH CARE EDUCATION/TRAINING PROGRAM

## 2022-05-13 PROCEDURE — 250N000011 HC RX IP 250 OP 636: Performed by: PHYSICIAN ASSISTANT

## 2022-05-13 PROCEDURE — 250N000009 HC RX 250: Performed by: NURSE ANESTHETIST, CERTIFIED REGISTERED

## 2022-05-13 PROCEDURE — 36415 COLL VENOUS BLD VENIPUNCTURE: CPT | Performed by: STUDENT IN AN ORGANIZED HEALTH CARE EDUCATION/TRAINING PROGRAM

## 2022-05-13 PROCEDURE — 86140 C-REACTIVE PROTEIN: CPT | Performed by: OTOLARYNGOLOGY

## 2022-05-13 PROCEDURE — 85652 RBC SED RATE AUTOMATED: CPT | Performed by: OTOLARYNGOLOGY

## 2022-05-13 PROCEDURE — 710N000010 HC RECOVERY PHASE 1, LEVEL 2, PER MIN: Performed by: THORACIC SURGERY (CARDIOTHORACIC VASCULAR SURGERY)

## 2022-05-13 PROCEDURE — 83605 ASSAY OF LACTIC ACID: CPT | Performed by: STUDENT IN AN ORGANIZED HEALTH CARE EDUCATION/TRAINING PROGRAM

## 2022-05-13 PROCEDURE — 250N000011 HC RX IP 250 OP 636: Performed by: NURSE ANESTHETIST, CERTIFIED REGISTERED

## 2022-05-13 PROCEDURE — 999N000141 HC STATISTIC PRE-PROCEDURE NURSING ASSESSMENT: Performed by: THORACIC SURGERY (CARDIOTHORACIC VASCULAR SURGERY)

## 2022-05-13 PROCEDURE — 83735 ASSAY OF MAGNESIUM: CPT | Performed by: OTOLARYNGOLOGY

## 2022-05-13 RX ORDER — HALOPERIDOL 5 MG/ML
1 INJECTION INTRAMUSCULAR
Status: DISCONTINUED | OUTPATIENT
Start: 2022-05-13 | End: 2022-05-13 | Stop reason: HOSPADM

## 2022-05-13 RX ORDER — SODIUM CHLORIDE, SODIUM LACTATE, POTASSIUM CHLORIDE, CALCIUM CHLORIDE 600; 310; 30; 20 MG/100ML; MG/100ML; MG/100ML; MG/100ML
INJECTION, SOLUTION INTRAVENOUS CONTINUOUS
Status: DISCONTINUED | OUTPATIENT
Start: 2022-05-13 | End: 2022-05-14

## 2022-05-13 RX ORDER — OXYCODONE HYDROCHLORIDE 5 MG/1
5 TABLET ORAL EVERY 4 HOURS PRN
Status: DISCONTINUED | OUTPATIENT
Start: 2022-05-13 | End: 2022-05-13 | Stop reason: HOSPADM

## 2022-05-13 RX ORDER — PROPOFOL 10 MG/ML
INJECTION, EMULSION INTRAVENOUS PRN
Status: DISCONTINUED | OUTPATIENT
Start: 2022-05-13 | End: 2022-05-13

## 2022-05-13 RX ORDER — DIAZEPAM 10 MG/2ML
2.5 INJECTION, SOLUTION INTRAMUSCULAR; INTRAVENOUS
Status: DISCONTINUED | OUTPATIENT
Start: 2022-05-13 | End: 2022-05-13 | Stop reason: HOSPADM

## 2022-05-13 RX ORDER — DIPHENHYDRAMINE HYDROCHLORIDE 50 MG/ML
25 INJECTION INTRAMUSCULAR; INTRAVENOUS EVERY 6 HOURS PRN
Status: DISCONTINUED | OUTPATIENT
Start: 2022-05-13 | End: 2022-05-13 | Stop reason: HOSPADM

## 2022-05-13 RX ORDER — ONDANSETRON 2 MG/ML
INJECTION INTRAMUSCULAR; INTRAVENOUS PRN
Status: DISCONTINUED | OUTPATIENT
Start: 2022-05-13 | End: 2022-05-13

## 2022-05-13 RX ORDER — LABETALOL HYDROCHLORIDE 5 MG/ML
10 INJECTION, SOLUTION INTRAVENOUS
Status: DISCONTINUED | OUTPATIENT
Start: 2022-05-13 | End: 2022-05-13 | Stop reason: HOSPADM

## 2022-05-13 RX ORDER — CHLORHEXIDINE GLUCONATE ORAL RINSE 1.2 MG/ML
SOLUTION DENTAL PRN
Status: DISCONTINUED | OUTPATIENT
Start: 2022-05-13 | End: 2022-05-13 | Stop reason: HOSPADM

## 2022-05-13 RX ORDER — HYDRALAZINE HYDROCHLORIDE 20 MG/ML
2.5-5 INJECTION INTRAMUSCULAR; INTRAVENOUS EVERY 10 MIN PRN
Status: DISCONTINUED | OUTPATIENT
Start: 2022-05-13 | End: 2022-05-13 | Stop reason: HOSPADM

## 2022-05-13 RX ORDER — FENTANYL CITRATE 50 UG/ML
INJECTION, SOLUTION INTRAMUSCULAR; INTRAVENOUS PRN
Status: DISCONTINUED | OUTPATIENT
Start: 2022-05-13 | End: 2022-05-13

## 2022-05-13 RX ORDER — SODIUM CHLORIDE, SODIUM LACTATE, POTASSIUM CHLORIDE, CALCIUM CHLORIDE 600; 310; 30; 20 MG/100ML; MG/100ML; MG/100ML; MG/100ML
INJECTION, SOLUTION INTRAVENOUS CONTINUOUS
Status: DISCONTINUED | OUTPATIENT
Start: 2022-05-13 | End: 2022-05-13 | Stop reason: HOSPADM

## 2022-05-13 RX ORDER — LIDOCAINE 40 MG/G
CREAM TOPICAL
Status: DISCONTINUED | OUTPATIENT
Start: 2022-05-13 | End: 2022-05-26 | Stop reason: HOSPADM

## 2022-05-13 RX ORDER — LIDOCAINE HYDROCHLORIDE 20 MG/ML
INJECTION, SOLUTION INFILTRATION; PERINEURAL PRN
Status: DISCONTINUED | OUTPATIENT
Start: 2022-05-13 | End: 2022-05-13

## 2022-05-13 RX ORDER — MEPERIDINE HYDROCHLORIDE 25 MG/ML
12.5 INJECTION INTRAMUSCULAR; INTRAVENOUS; SUBCUTANEOUS EVERY 5 MIN PRN
Status: DISCONTINUED | OUTPATIENT
Start: 2022-05-13 | End: 2022-05-13 | Stop reason: HOSPADM

## 2022-05-13 RX ORDER — FENTANYL CITRATE 50 UG/ML
25 INJECTION, SOLUTION INTRAMUSCULAR; INTRAVENOUS EVERY 5 MIN PRN
Status: DISCONTINUED | OUTPATIENT
Start: 2022-05-13 | End: 2022-05-13 | Stop reason: HOSPADM

## 2022-05-13 RX ORDER — SODIUM HYPOCHLORITE 5 MG/ML
SOLUTION TOPICAL ONCE
Status: COMPLETED | OUTPATIENT
Start: 2022-05-13 | End: 2022-05-13

## 2022-05-13 RX ORDER — ENOXAPARIN SODIUM 100 MG/ML
40 INJECTION SUBCUTANEOUS
Status: COMPLETED | OUTPATIENT
Start: 2022-05-13 | End: 2022-05-13

## 2022-05-13 RX ORDER — DIPHENHYDRAMINE HCL 25 MG
25 CAPSULE ORAL EVERY 6 HOURS PRN
Status: DISCONTINUED | OUTPATIENT
Start: 2022-05-13 | End: 2022-05-13 | Stop reason: HOSPADM

## 2022-05-13 RX ORDER — ONDANSETRON 4 MG/1
4 TABLET, ORALLY DISINTEGRATING ORAL EVERY 30 MIN PRN
Status: DISCONTINUED | OUTPATIENT
Start: 2022-05-13 | End: 2022-05-13 | Stop reason: HOSPADM

## 2022-05-13 RX ORDER — ONDANSETRON 2 MG/ML
4 INJECTION INTRAMUSCULAR; INTRAVENOUS EVERY 30 MIN PRN
Status: DISCONTINUED | OUTPATIENT
Start: 2022-05-13 | End: 2022-05-13 | Stop reason: HOSPADM

## 2022-05-13 RX ORDER — HYDROMORPHONE HYDROCHLORIDE 1 MG/ML
0.2 INJECTION, SOLUTION INTRAMUSCULAR; INTRAVENOUS; SUBCUTANEOUS EVERY 5 MIN PRN
Status: DISCONTINUED | OUTPATIENT
Start: 2022-05-13 | End: 2022-05-13 | Stop reason: HOSPADM

## 2022-05-13 RX ORDER — SODIUM CHLORIDE, SODIUM LACTATE, POTASSIUM CHLORIDE, CALCIUM CHLORIDE 600; 310; 30; 20 MG/100ML; MG/100ML; MG/100ML; MG/100ML
INJECTION, SOLUTION INTRAVENOUS CONTINUOUS PRN
Status: DISCONTINUED | OUTPATIENT
Start: 2022-05-13 | End: 2022-05-13

## 2022-05-13 RX ADMIN — INSULIN ASPART 1 UNITS: 100 INJECTION, SOLUTION INTRAVENOUS; SUBCUTANEOUS at 23:46

## 2022-05-13 RX ADMIN — ENOXAPARIN SODIUM 90 MG: 100 INJECTION SUBCUTANEOUS at 23:46

## 2022-05-13 RX ADMIN — Medication 1 PACKET: at 20:08

## 2022-05-13 RX ADMIN — ROCURONIUM BROMIDE 5 MG: 50 INJECTION, SOLUTION INTRAVENOUS at 13:50

## 2022-05-13 RX ADMIN — ROCURONIUM BROMIDE 10 MG: 50 INJECTION, SOLUTION INTRAVENOUS at 13:55

## 2022-05-13 RX ADMIN — SUGAMMADEX 200 MG: 100 INJECTION, SOLUTION INTRAVENOUS at 14:02

## 2022-05-13 RX ADMIN — Medication 1 PACKET: at 17:24

## 2022-05-13 RX ADMIN — Medication 6 MG: at 21:38

## 2022-05-13 RX ADMIN — Medication 2 PACKET: at 20:07

## 2022-05-13 RX ADMIN — ENOXAPARIN SODIUM 40 MG: 40 INJECTION SUBCUTANEOUS at 11:15

## 2022-05-13 RX ADMIN — FENTANYL CITRATE 50 MCG: 50 INJECTION, SOLUTION INTRAMUSCULAR; INTRAVENOUS at 13:18

## 2022-05-13 RX ADMIN — LIDOCAINE HYDROCHLORIDE 100 MG: 20 INJECTION, SOLUTION INFILTRATION; PERINEURAL at 12:32

## 2022-05-13 RX ADMIN — PROPOFOL 200 MG: 10 INJECTION, EMULSION INTRAVENOUS at 12:34

## 2022-05-13 RX ADMIN — PIPERACILLIN AND TAZOBACTAM 3.38 G: 3; .375 INJECTION, POWDER, FOR SOLUTION INTRAVENOUS at 16:05

## 2022-05-13 RX ADMIN — SENNOSIDES AND DOCUSATE SODIUM 2 TABLET: 8.6; 5 TABLET ORAL at 20:07

## 2022-05-13 RX ADMIN — ROCURONIUM BROMIDE 50 MG: 50 INJECTION, SOLUTION INTRAVENOUS at 12:35

## 2022-05-13 RX ADMIN — SODIUM CHLORIDE, POTASSIUM CHLORIDE, SODIUM LACTATE AND CALCIUM CHLORIDE: 600; 310; 30; 20 INJECTION, SOLUTION INTRAVENOUS at 12:28

## 2022-05-13 RX ADMIN — ONDANSETRON 4 MG: 2 INJECTION INTRAMUSCULAR; INTRAVENOUS at 13:42

## 2022-05-13 RX ADMIN — ROCURONIUM BROMIDE 20 MG: 50 INJECTION, SOLUTION INTRAVENOUS at 13:04

## 2022-05-13 RX ADMIN — PIPERACILLIN AND TAZOBACTAM 3.38 G: 3; .375 INJECTION, POWDER, FOR SOLUTION INTRAVENOUS at 03:37

## 2022-05-13 RX ADMIN — PIPERACILLIN AND TAZOBACTAM 3.38 G: 3; .375 INJECTION, POWDER, FOR SOLUTION INTRAVENOUS at 21:38

## 2022-05-13 RX ADMIN — ACETAMINOPHEN 325MG 650 MG: 325 TABLET ORAL at 21:38

## 2022-05-13 RX ADMIN — HYDROMORPHONE HYDROCHLORIDE 0.5 MG: 1 INJECTION, SOLUTION INTRAMUSCULAR; INTRAVENOUS; SUBCUTANEOUS at 16:04

## 2022-05-13 RX ADMIN — FLUCONAZOLE 400 MG: 2 INJECTION INTRAVENOUS at 13:10

## 2022-05-13 RX ADMIN — Medication 8 MCG: at 13:43

## 2022-05-13 RX ADMIN — PIPERACILLIN AND TAZOBACTAM 3.38 G: 3; .375 INJECTION, POWDER, FOR SOLUTION INTRAVENOUS at 09:55

## 2022-05-13 RX ADMIN — LIDOCAINE HYDROCHLORIDE 100 MG: 20 INJECTION, SOLUTION INFILTRATION; PERINEURAL at 12:33

## 2022-05-13 RX ADMIN — MIDAZOLAM 2 MG: 1 INJECTION INTRAMUSCULAR; INTRAVENOUS at 12:20

## 2022-05-13 RX ADMIN — FENTANYL CITRATE 150 MCG: 50 INJECTION, SOLUTION INTRAMUSCULAR; INTRAVENOUS at 12:32

## 2022-05-13 ASSESSMENT — ACTIVITIES OF DAILY LIVING (ADL)
ADLS_ACUITY_SCORE: 22

## 2022-05-13 NOTE — PLAN OF CARE
Status: Necrotizing soft tissue infection of the neck.   Vitals: VSS except for intermittent fevers.  Neuros: Intact.  IV: L PIV infusing LR at 100mL/hr.  Labs/Electrolytes: Ca 8.3, other electrolytes WDL  Resp/trach: WNL. Copious oral secretions, self suctions.  Diet: NPO since midnight. Tolerated 1 can of TF last evening, advance to 1.5 cans at next feeding.   Bowel status: LB 5/12  : Voiding via urinal   Skin: Neck incision L and medial covered with dressing, packing done per ENT. L and R old chest tube sites covered with dressings WNL. Bruising throughout along with scabbing. Diffuse blanchable erythema noted near neck incision, marked, decreasing. PEG site WNL.  Pain: Denies  Activity: Up independently in room, not oob this shift.   Plan: Wound washout this morning in OR. SW consult to provide support system for pt.   Updates this shift: Pt anxious at 0315 stated he watched a show that made him feel uneasy. Reported brief tingling to L arm, resolved, otherwise intact.

## 2022-05-13 NOTE — PROGRESS NOTES
Arrived from: 4A   Belongings/meds: clothes and shoes.   2 RN Skin Assessment Completed by: Indra    Non-intact findings documented (yes/no/NA): Yes. Neck incision L and medial covered with dressing. L and R old chest tube sites covered with dressings WNL. Bruising throughout along with scabbing. Diffuse blanchable erythema noted near neck incision, marked. PEG site WNL.

## 2022-05-13 NOTE — PLAN OF CARE
Status: Here for necrotizing soft tissue infection. Multiple washouts and tooth extractions. POD #0 I&D.   VS: VSS except for intermittent fevers and tachycardia. On RA. Refused  capno  Neuros: Intact.   GI: NPO. 1.5 cans TF given today. Goal of 7 cans a day. Increase to 2 cans TF for next meal.   : Voiding spontaneously.   Respiratory: WNL. Copious oral secretions, self suctions.   Skin: Neck incision L and medial covered with dressing, packing done per ENT. L and R chest tube sites covered with dressings WNL. Bruising throughout along with scabbing. Diffuse blanchable erythema noted near neck incision, marked. PEG site WNL.    IV: PIV running LR at 100 mL/hr. Abx as well.    Activity: Up independently in room.   Pain: Denies now, post-op c/o neck pain controlled with PRN IV dilaudid.   Plan of care:  Continue to monitor and follow current POC

## 2022-05-13 NOTE — BRIEF OP NOTE
[facility]    Brief Operative Note    Pre-operative diagnosis: Mediastinitis [J98.51]  Post-operative diagnosis Same as pre-operative diagnosis    Procedure: Procedure(s):  Oral Exploration, Neck Exploration  Surgeon: Surgeon(s) and Role:     * Syd Vila MD - Primary     * Mj Erazo MD - Resident - Assisting     * Ally Barry MD - Resident - Assisting  Anesthesia: General   Estimated Blood Loss: Less than 50 ml    Drains: None, kerlix packing placed  Specimens: * No specimens in log *  Findings:   Some purulence right parapharynx.  Complications: None.  Implants: * No implants in log *      DISPO -6A

## 2022-05-13 NOTE — INTERVAL H&P NOTE
I have reviewed the surgical (or preoperative) H&P that is linked to this encounter, and examined the patient. Noted changes include: No longer intubated or on pressors    Clinical Conditions Present on Arrival:  Clinically Significant Risk Factors Present on Admission

## 2022-05-13 NOTE — ANESTHESIA CARE TRANSFER NOTE
Patient: Robert Gallo    Procedure: Procedure(s):  Oral Exploration, Neck Exploration       Diagnosis: Mediastinitis [J98.51]  Diagnosis Additional Information: No value filed.    Anesthesia Type:   General     Note:    Oropharynx: oropharynx clear of all foreign objects  Level of Consciousness: drowsy  Oxygen Supplementation: nasal cannula  Level of Supplemental Oxygen (L/min / FiO2): 4  Independent Airway: airway patency satisfactory and stable  Dentition: dentition unchanged  Vital Signs Stable: post-procedure vital signs reviewed and stable  Report to RN Given: handoff report given  Patient transferred to: PACU    Handoff Report: Identifed the Patient, Identified the Reponsible Provider, Reviewed the pertinent medical history, Discussed the surgical course, Reviewed Intra-OP anesthesia mangement and issues during anesthesia, Set expectations for post-procedure period and Allowed opportunity for questions and acknowledgement of understanding      Vitals:  Vitals Value Taken Time   BP     Temp     Pulse 96 05/13/22 1418   Resp 24 05/13/22 1418   SpO2 95 % 05/13/22 1418   Vitals shown include unvalidated device data.    Electronically Signed By: KIYA Ross CRNA  May 13, 2022  2:19 PM

## 2022-05-13 NOTE — PROGRESS NOTES
ID Chart Review Update    Patient in OR and not seen.    No resistant gram negatives isolated, would recommend narrowing antimicrobials to Unasyn and fluconazole. Ongoing fevers are due to lack of source control and patient is in OR for I&D with ENT and thoracic surgery. Will follow intra-operative cultures. ID will not plan on seeing over the weekend. Please contact on-call provider if assistance is needed.    Yg Douglas MD  Infectious Disease Fellow

## 2022-05-13 NOTE — PROGRESS NOTES
"ENT Progress Note  5/13/2022    S: Planning for OR w/ thoracic this afternoon. ENT will be present to contribute. Started bolus TF yesterday. Patient feels subjective improvement. Tmax 100.6 overnight.    O  /56 (BP Location: Right arm)   Pulse 90   Temp (!) 100.5  F (38.1  C) (Oral)   Resp 19   Ht 1.9 m (6' 2.8\")   Wt 98.4 kg (216 lb 14.9 oz)   SpO2 95%   BMI 27.26 kg/m     GENERAL: Extubated, comfortable   HEENT: submental incision healing with no drainage. neck loosely packed with kerlix, packing soaked with serious fluid, residual dentition poor. No dressing change this am.     LABS:  5/13 AM labs pending WBC, CRP, ESR    IMAGING - CXR 5/11  1. Stable to minimal improved streaky bibasilar opacities may  represent atelectasis or infection.  2. Trace bilateral pleural effusions are also present.    CT Soft Tissue Neck 5/12/2022  Impression:  1.  Open anterior neck wound with mildly increased size of multifocal  abscesses within the ventral and deep spaces of the neck.  2.  Interval removal of left sublingual space drain with new  rim-enhancing collections along the course of the previous drain.   3.  Increased size of left paraesophageal fluid collection. Again,  cannot definitely exclude upper esophageal injury.  4.  Mucosal hypopharyngeal enhancement status post extubation.  5.  Developing small abscesses in the submandibular glands  bilaterally. Stable to slightly increased small intranodal abscesses  within multiple cervical lymph nodes.  6.  Upper chest better characterized on comparison same-day chest CT.  Lungs again demonstrate demonstrates decreased but persistent patchy  ground glass and consolidative opacities, loculated pleural effusions.    CT Chest w/ contrast 5/12/2022  IMPRESSION:   1. Large open skin defect in the anterior base of the neck and  overlying the second intercostal space with increased size of  retrosternal, second right intercostal and left para esophageal " region  abscesses as detailed above.  2. Stable bibasilar dependent consolidative opacities, likely related  to atelectasis and/or aspiration.  3. Stable bilateral small pleural effusions.      Assessment and Plan  Robert Gallo is a 26 year old male with no significant past medical history with necrotizing fasciitis of the neck with mediastinal involvement s/p I&D of bilateral parapharyngeal spaces, sublingual space, bilateral necks, and extraction of teeth #19 and 20 and bilateral VATS and PEG tube placement by Thoracic Surgery on 5/4/2022. Continuing to complete dressing changes. Patient to be taken back to OR on 5/12/2022 to address paraesophageal fluid collection, ENT will be present to evaluate anterior neck involvement.    Neuro:  - pain seems reasonably controlled  - Possible chemical dependency consult will discuss with staff  HEENT:  - Oral surgery: no further extractions currently, appreciate input. OMFS will discuss w/ adult dental  - CT scans resulted, Thoracic Surgery to take patient to OR with ENT present to contribute to clean out  - Twice daily packing changes to the neck with Dakins soaked Kerlix to be performed by ENT, held this am  Respiratory:  - room air   CV/heme:  - hemodynamically stable without pressors, some persistent tachycardia   FEN/GI:  - Status post PEG tube placement by Thoracic Surgery  - Bolus feed orders placed by nutrition  :  - voiding   Endo  -ISS   Heme/ID:  - ID - fluconazole, Zosyn  - Trend CBC, CRP, and ESR  - therapeutic lovenox held currently for OR  - CT neck w/ IV 5/9, 5/12  PPX:  - lovenox as above  - SCDs  CONSULTS  -Chemical dependency consult, will discuss with staff  -ID  -SLP, passed swallow study     --Patient and plan to be discussed with Dr. Avila-Justus Dasilva PA-C  Otolaryngology-Head & Neck Surgery  Please contact ENT by dialing [phone] and entering job code [phone].

## 2022-05-13 NOTE — OP NOTE
NAME: Robert Gallo    MEDICAL RECORD NUMBER: [id number]    YOB: 1995    DATE OF SURGERY: May 13, 2022    SURGEON: Syd Vila MD    ASSISTANT SURGEON: Mj Erazo MD - Resident - Assisting     Ally Barry MD - Resident - Assisting    PREOPERATIVE DIAGNOSIS: paraesophageal infection, neck infection    POSTOPERATIVE DIAGNOSIS: same    PROCEDURE: neck exploration, oral cavity exploration    INDICATIONS: Ms. Robert Gallo is a 26M who was admitted for necrotizing fasciitis related to odontogenic infection over a week ago. After initial surgical management on admission he had been undergoing daily packing changes and receiving IV antibiotics. He improved clinically and no longer appeared septic or to have fulminant necrosis of any neck or chest tissues. However, he did begin to have recurrent fevers. A CT neck w/ IV contrast showed a paraesophageal fluid collection. He was indicated for drainage to achieve source control. After detailed discussion of the risks, benefits, and alternatives to surgery, patient elected to proceed with the aforementioned procedures.    ANESTHESIA: General; endotracheal intubation    FINDINGS: paraesophageal pocket opened and finger dissected, no purulence  Retrosternal area opened and finger dissected  Deep neck and para-hyoid spaces opened and finger dissected  Bilateral parapharyngeal spaces opened and finger dissected, purulence right side    EBL: 10 cc    SPECIMENS: none    COMPLICATIONS: None    DESCRIPTION OF PROCEDURE: Informed consent was obtained and the patient was brought to the operating room. The patient was laid supine on the operating table and anesthesia was induced via mask ventilation. Patient was then endotracheally intubated by the Anesthesia team without difficulty. In situ neck and chest dressings were removed. The patient was then prepped and draped in the usual fashion, including oral peridex and teeth brushing. A timeout was  performed.    Open neck and chest wounds appeared healthy with normal-appearing granulation tissue. The areas deep to bilateral strap muscles were finger dissected, followed by the retrosternal space. The paraesophageal space was opened with a tonsil and finger dissected inferiorly to the sternum. The area around the hyoid was bluntly finger-dissected. There was no obvious return of purulence. The wounds were irrigated thoroughly with sterile saline and packed with dakins-soaked kerlix.    The oral cavity was explored. There was purulence once the right parapharyngeal space was reopened. Both were opened, irrigated, and cleaned with peridex. This drapes were taken down.    This completed the procedure. There were no complications and all counts were correct. Patient was returned to the care of the Anesthesia team for extubation. Dr. Vila was present for all critical portions of the case.    DISPOSITION: 6A    Mj Erazo MD

## 2022-05-13 NOTE — PLAN OF CARE
Status: Here for necrotizing soft tissue infection.   VS: VSS except for intermittent fevers.   Neuros: Intact.   GI: NPO. One can of tube feeds completed at 1900 and tolerated it well, advance per nursing order set to 1.5 cans with next feed for goal of 7 cans/day total.   : Voiding spontaneously.   Respiratory: WNL. Copious oral secretions, self suctions.   Skin: Neck incision L and medial covered with dressing, packing done per ENT. L and R old chest tube sites covered with dressings WNL. Bruising throughout along with scabbing. Diffuse blanchable erythema noted near neck incision, marked. PEG site WNL.    IV: 1 PIV SL in between abx.   Activity: Up independently in room.   Pain: Denies.   Plan of care:  NPO at midnight for procedure tomorrow morning. Start LR per MAR at midnight. Wound wash out tomorrow morning in OR. Pt desires SW consult as he does not have good support system (charge RN notified).

## 2022-05-13 NOTE — ANESTHESIA PROCEDURE NOTES
Airway       Patient location during procedure: OR       Procedure Start/Stop Times: 5/13/2022 12:41 PM  Staff -        CRNA: Emely Alcantara APRN CRNA       Performed By: CRNA  Consent for Airway        Urgency: elective  Indications and Patient Condition       Indications for airway management: deanne-procedural       Induction type:intravenous       Mask difficulty assessment: 2 - vent by mask + OA or adjuvant +/- NMBA (Easiest to mask with nasal trumpet airway)    Final Airway Details       Final airway type: endotracheal airway       Successful airway: ETT - single  Endotracheal Airway Details        ETT size (mm): 8.0       Cuffed: yes       Successful intubation technique: video laryngoscopy       VL Blade Size: MAC 4       Grade View of Cords: 1       Adjucts: stylet       Position: Left       Measured from: lips       Secured at (cm): 22       Bite block used: None    Post intubation assessment        Placement verified by: capnometry and chest rise        Number of attempts at approach: 1       Secured with: pink tape       Ease of procedure: easy (Does have short thyromental distance, stiff neck, limited mouth opening)       Dentition: Intact and Unchanged (Extremely poor dentition, unchanged)    Medication(s) Administered   Medication Administration Time: 5/13/2022 12:41 PM

## 2022-05-13 NOTE — ANESTHESIA POSTPROCEDURE EVALUATION
Patient: Robert Gallo    Procedure: Procedure(s):  Oral Exploration, Neck Exploration       Anesthesia Type:  General    Note:  Disposition: Inpatient   Postop Pain Control: Uneventful            Sign Out: Well controlled pain   PONV: No   Neuro/Psych: Uneventful            Sign Out: Acceptable/Baseline neuro status   Airway/Respiratory: Uneventful            Sign Out: Acceptable/Baseline resp. status   CV/Hemodynamics: Uneventful            Sign Out: Acceptable CV status; No obvious hypovolemia; No obvious fluid overload   Other NRE: NONE   DID A NON-ROUTINE EVENT OCCUR? No           Last vitals:  Vitals Value Taken Time   /76 05/13/22 1500   Temp 37.1  C (98.8  F) 05/13/22 1415   Pulse 101 05/13/22 1503   Resp 23 05/13/22 1503   SpO2 92 % 05/13/22 1504   Vitals shown include unvalidated device data.    Electronically Signed By: Sheila Dunn MD  May 13, 2022  3:10 PM

## 2022-05-13 NOTE — PROGRESS NOTES
"Thoracic Surgery  Progress Note    S: Intermittently febrile overnight. States that he is doing \"alright\" this morning. Denies any subjective fevers or pain.    O:  /70 (BP Location: Right arm)   Pulse 82   Temp 99.4  F (37.4  C) (Oral)   Resp 16   Ht 1.9 m (6' 2.8\")   Wt 98.4 kg (216 lb 14.9 oz)   SpO2 98%   BMI 27.26 kg/m      Physical Exam:  Gen: NAD, awake, alert, laying in bed  Pulm: NLB ORA  Extr: wwp, no edema  Wound: large neck wound and chest wound w/no active drainage    I&O:  I/O last 3 completed shifts:  In: 1760 [I.V.:300; NG/GT:775]  Out: 850 [Urine:850]      Labs and imaging:  Labs- reviewed    CT Neck w/Contrast 5/12/22  Impression:  1.  Open anterior neck wound with mildly increased size of multifocal  abscesses within the ventral and deep spaces of the neck.  2.  Interval removal of left sublingual space drain with new  rim-enhancing collections along the course of the previous drain.   3.  Increased size of left paraesophageal fluid collection. Again,  cannot definitely exclude upper esophageal injury.  4.  Mucosal hypopharyngeal enhancement status post extubation.  5.  Developing small abscesses in the submandibular glands  bilaterally. Stable to slightly increased small intranodal abscesses  within multiple cervical lymph nodes.  6.  Upper chest better characterized on comparison same-day chest CT.  Lungs again demonstrate demonstrates decreased but persistent patchy  ground glass and consolidative opacities, loculated pleural effusions.       CT Chest w/Contrast 5/12/22  IMPRESSION:   1. Large open skin defect in the anterior base of the neck and  overlying the second intercostal space with increased size of  retrosternal, second right intercostal and left para esophageal region  abscesses as detailed above.  2. Stable bibasilar dependent consolidative opacities, likely related  to atelectasis and/or aspiration.  3. Stable bilateral small pleural effusions.       A: Robert Gallo " is a 26 year old with no significant PMHx who was admitted from [facility] [place] with necrotizing fasciitis of neck with mediastinal involvement s/p emergent bilateral VATS, right anterior mediastinotomy, I&D of bilateral pharyngeal abscess, and PEG tube placement 5/4 with Thoracic and ENT. Persistent fevers despite abx and antifungal, CT neck and chest with IV contrast shows increased size of retrosternal, 2nd right intercostal, and left paraesophageal abscesses.    - OR today for I&D  - therapeutic Lovenox held today  - remainder of cares per primary team  - please call with questions      Angie Villasenor MD  PGY-1, General Surgery

## 2022-05-13 NOTE — ANESTHESIA PREPROCEDURE EVALUATION
Anesthesia Pre-Procedure Evaluation    Patient: Robert Gallo   MRN: [id number] : 1995        Procedure : Procedure(s):  EXPLORATION, NECK          Past Medical History:   Diagnosis Date     No known problems       Past Surgical History:   Procedure Laterality Date     IRRIGATION AND DEBRIDEMENT NECK, COMBINED N/A 2022    Procedure: INCISION AND DRAINAGE PARAPHARYNGEAL ABCESS ON RIGHT AND LEFT;  Surgeon: Milena Gore MD;  Location: [facility] OR     PICC DOUBLE LUMEN PLACEMENT Left 2022    Left basilic, 47 cm, 1 external length     THORACOSCOPY N/A 2022    Procedure: BILATERAL VIDEO ASSISTED THORACOTOMY. RIGHT ANTERIOR MEDIASTINOTYOMY. PEG TUBE.;  Surgeon: Abundio Patel MD;  Location: [facility] OR     THORACOSCOPY  2022    Procedure: ;  Surgeon: Abundio Patel MD;  Location: [facility] OR      No Known Allergies   Social History     Tobacco Use     Smoking status: Not on file     Smokeless tobacco: Not on file   Substance Use Topics     Alcohol use: Not on file      Wt Readings from Last 1 Encounters:   22 98.4 kg (216 lb 14.9 oz)        Anesthesia Evaluation   Pt has had prior anesthetic. Type: General.    No history of anesthetic complications       ROS/MED HX  ENT/Pulmonary: Comment: Nec Fasc from dental abscess with subsequent mediastinitis.    (+) recent URI, Probable bilateral pneumonia:     Neurologic:  - neg neurologic ROS     Cardiovascular:       METS/Exercise Tolerance: >4 METS    Hematologic:     (+) anemia,     Musculoskeletal:  - neg musculoskeletal ROS     GI/Hepatic:       Renal/Genitourinary:  - neg Renal ROS     Endo:  - neg endo ROS     Psychiatric/Substance Use:     (+) Recreational drug usage: Cannabis and Other (Comment) (probably opioids).    Infectious Disease: Comment: Nec fasc of the neck and mediastinum    (+) Recent Fever,     Malignancy:       Other:            Physical Exam    Airway      Comment: Limited by pain    Mallampati: III   TM distance: >  3 FB   Neck ROM: full   Mouth opening: < 3 cm    Respiratory Devices and Support         Dental     Comment: Overall very poor dentition and periodontal disease.      B=Bridge, C=Chipped, L=Loose, M=Missing    Cardiovascular   cardiovascular exam normal          Pulmonary   pulmonary exam normal                OUTSIDE LABS:  CBC:   Lab Results   Component Value Date    WBC 11.3 (H) 05/12/2022    WBC 9.2 05/11/2022    HGB 10.9 (L) 05/12/2022    HGB 10.2 (L) 05/11/2022    HCT 34.9 (L) 05/12/2022    HCT 34.0 (L) 05/11/2022     05/12/2022     05/11/2022     BMP:   Lab Results   Component Value Date     05/12/2022     05/11/2022    POTASSIUM 3.8 05/12/2022    POTASSIUM 3.6 05/11/2022    CHLORIDE 107 05/12/2022    CHLORIDE 110 (H) 05/11/2022    CO2 25 05/12/2022    CO2 26 05/11/2022    BUN 16 05/12/2022    BUN 16 05/11/2022    CR 0.74 05/12/2022    CR 0.78 05/11/2022     (H) 05/13/2022    GLC 95 05/12/2022     COAGS:   Lab Results   Component Value Date    PTT 39 (H) 05/04/2022    INR 1.41 (H) 05/04/2022    FIBR 692 (H) 05/04/2022     POC: No results found for: BGM, HCG, HCGS  HEPATIC:   Lab Results   Component Value Date    ALBUMIN 1.9 (L) 05/09/2022    PROTTOTAL 6.5 (L) 05/09/2022     (H) 05/09/2022    AST 75 (H) 05/09/2022    ALKPHOS 164 (H) 05/09/2022    BILITOTAL 0.6 05/09/2022     OTHER:   Lab Results   Component Value Date    PH 7.37 05/04/2022    LACT 1.1 05/12/2022    A1C 4.8 05/04/2022    OLYA 8.3 (L) 05/12/2022    PHOS 3.6 05/12/2022    MAG 2.2 05/12/2022    LIPASE 135 05/08/2022    .0 (H) 05/12/2022    SED 91 (H) 05/12/2022       Anesthesia Plan    ASA Status:  2      Anesthesia Type: General.     - Airway: ETT   Induction: Intravenous, Propofol.   Maintenance: Balanced.   Techniques and Equipment:     - Lines/Monitors: 2nd IV     Consents    Anesthesia Plan(s) and associated risks, benefits, and realistic alternatives discussed. Questions answered and  patient/representative(s) expressed understanding.    - Discussed:     - Discussed with:  Patient      - Extended Intubation/Ventilatory Support Discussed: Yes.      - Patient is DNR/DNI Status: No    Use of blood products discussed: Yes.     - Discussed with: Patient.     - Consented: consented to blood products            Reason for refusal: other.     Postoperative Care    Pain management: IV analgesics.   PONV prophylaxis: Ondansetron (or other 5HT-3), Dexamethasone or Solumedrol     Comments:                Denver Higuera MD
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
## 2022-05-14 ENCOUNTER — APPOINTMENT (OUTPATIENT)
Dept: OCCUPATIONAL THERAPY | Facility: CLINIC | Age: 27
End: 2022-05-14
Payer: COMMERCIAL

## 2022-05-14 ENCOUNTER — APPOINTMENT (OUTPATIENT)
Dept: SPEECH THERAPY | Facility: CLINIC | Age: 27
End: 2022-05-14
Payer: COMMERCIAL

## 2022-05-14 LAB
ANION GAP SERPL CALCULATED.3IONS-SCNC: 8 MMOL/L (ref 3–14)
BACTERIA BLD CULT: NO GROWTH
BACTERIA BLD CULT: NO GROWTH
BUN SERPL-MCNC: 16 MG/DL (ref 7–30)
CALCIUM SERPL-MCNC: 8.7 MG/DL (ref 8.5–10.1)
CHLORIDE BLD-SCNC: 106 MMOL/L (ref 94–109)
CO2 SERPL-SCNC: 23 MMOL/L (ref 20–32)
CREAT SERPL-MCNC: 0.84 MG/DL (ref 0.66–1.25)
CRP SERPL-MCNC: 130 MG/L (ref 0–8)
ERYTHROCYTE [DISTWIDTH] IN BLOOD BY AUTOMATED COUNT: 13.6 % (ref 10–15)
ERYTHROCYTE [SEDIMENTATION RATE] IN BLOOD BY WESTERGREN METHOD: 74 MM/HR (ref 0–15)
GFR SERPL CREATININE-BSD FRML MDRD: >90 ML/MIN/1.73M2
GLUCOSE BLD-MCNC: 127 MG/DL (ref 70–99)
GLUCOSE BLDC GLUCOMTR-MCNC: 120 MG/DL (ref 70–99)
GLUCOSE BLDC GLUCOMTR-MCNC: 133 MG/DL (ref 70–99)
GLUCOSE BLDC GLUCOMTR-MCNC: 134 MG/DL (ref 70–99)
GLUCOSE BLDC GLUCOMTR-MCNC: 141 MG/DL (ref 70–99)
GLUCOSE BLDC GLUCOMTR-MCNC: 164 MG/DL (ref 70–99)
GLUCOSE BLDC GLUCOMTR-MCNC: 189 MG/DL (ref 70–99)
HCT VFR BLD AUTO: 33.9 % (ref 40–53)
HGB BLD-MCNC: 10.9 G/DL (ref 13.3–17.7)
LACTATE SERPL-SCNC: 0.9 MMOL/L (ref 0.7–2)
MAGNESIUM SERPL-MCNC: 2.4 MG/DL (ref 1.6–2.3)
MCH RBC QN AUTO: 30 PG (ref 26.5–33)
MCHC RBC AUTO-ENTMCNC: 32.2 G/DL (ref 31.5–36.5)
MCV RBC AUTO: 93 FL (ref 78–100)
PHOSPHATE SERPL-MCNC: 3.2 MG/DL (ref 2.5–4.5)
PLATELET # BLD AUTO: 415 10E3/UL (ref 150–450)
POTASSIUM BLD-SCNC: 4 MMOL/L (ref 3.4–5.3)
RBC # BLD AUTO: 3.63 10E6/UL (ref 4.4–5.9)
SODIUM SERPL-SCNC: 137 MMOL/L (ref 133–144)
WBC # BLD AUTO: 16.4 10E3/UL (ref 4–11)

## 2022-05-14 PROCEDURE — 250N000011 HC RX IP 250 OP 636: Performed by: PHYSICIAN ASSISTANT

## 2022-05-14 PROCEDURE — 250N000013 HC RX MED GY IP 250 OP 250 PS 637: Performed by: STUDENT IN AN ORGANIZED HEALTH CARE EDUCATION/TRAINING PROGRAM

## 2022-05-14 PROCEDURE — 86140 C-REACTIVE PROTEIN: CPT | Performed by: STUDENT IN AN ORGANIZED HEALTH CARE EDUCATION/TRAINING PROGRAM

## 2022-05-14 PROCEDURE — 85652 RBC SED RATE AUTOMATED: CPT | Performed by: STUDENT IN AN ORGANIZED HEALTH CARE EDUCATION/TRAINING PROGRAM

## 2022-05-14 PROCEDURE — 85027 COMPLETE CBC AUTOMATED: CPT | Performed by: STUDENT IN AN ORGANIZED HEALTH CARE EDUCATION/TRAINING PROGRAM

## 2022-05-14 PROCEDURE — 36415 COLL VENOUS BLD VENIPUNCTURE: CPT | Performed by: STUDENT IN AN ORGANIZED HEALTH CARE EDUCATION/TRAINING PROGRAM

## 2022-05-14 PROCEDURE — 99233 SBSQ HOSP IP/OBS HIGH 50: CPT | Performed by: INTERNAL MEDICINE

## 2022-05-14 PROCEDURE — 82310 ASSAY OF CALCIUM: CPT | Performed by: STUDENT IN AN ORGANIZED HEALTH CARE EDUCATION/TRAINING PROGRAM

## 2022-05-14 PROCEDURE — 83605 ASSAY OF LACTIC ACID: CPT | Performed by: STUDENT IN AN ORGANIZED HEALTH CARE EDUCATION/TRAINING PROGRAM

## 2022-05-14 PROCEDURE — 92526 ORAL FUNCTION THERAPY: CPT | Mod: GN

## 2022-05-14 PROCEDURE — 258N000003 HC RX IP 258 OP 636: Performed by: STUDENT IN AN ORGANIZED HEALTH CARE EDUCATION/TRAINING PROGRAM

## 2022-05-14 PROCEDURE — 97530 THERAPEUTIC ACTIVITIES: CPT | Mod: GO

## 2022-05-14 PROCEDURE — 83735 ASSAY OF MAGNESIUM: CPT | Performed by: STUDENT IN AN ORGANIZED HEALTH CARE EDUCATION/TRAINING PROGRAM

## 2022-05-14 PROCEDURE — 84100 ASSAY OF PHOSPHORUS: CPT | Performed by: STUDENT IN AN ORGANIZED HEALTH CARE EDUCATION/TRAINING PROGRAM

## 2022-05-14 PROCEDURE — 250N000011 HC RX IP 250 OP 636: Performed by: STUDENT IN AN ORGANIZED HEALTH CARE EDUCATION/TRAINING PROGRAM

## 2022-05-14 PROCEDURE — 250N000012 HC RX MED GY IP 250 OP 636 PS 637: Performed by: STUDENT IN AN ORGANIZED HEALTH CARE EDUCATION/TRAINING PROGRAM

## 2022-05-14 PROCEDURE — 120N000002 HC R&B MED SURG/OB UMMC

## 2022-05-14 RX ORDER — OXYCODONE HYDROCHLORIDE 5 MG/1
5 TABLET ORAL EVERY 6 HOURS PRN
Status: DISCONTINUED | OUTPATIENT
Start: 2022-05-14 | End: 2022-05-14

## 2022-05-14 RX ORDER — OXYCODONE HYDROCHLORIDE 5 MG/1
5 TABLET ORAL EVERY 6 HOURS PRN
Status: DISCONTINUED | OUTPATIENT
Start: 2022-05-14 | End: 2022-05-25

## 2022-05-14 RX ADMIN — HYOSCYAMINE SULFATE: 16 SOLUTION at 08:00

## 2022-05-14 RX ADMIN — PIPERACILLIN AND TAZOBACTAM 3.38 G: 3; .375 INJECTION, POWDER, FOR SOLUTION INTRAVENOUS at 17:51

## 2022-05-14 RX ADMIN — ENOXAPARIN SODIUM 90 MG: 100 INJECTION SUBCUTANEOUS at 12:11

## 2022-05-14 RX ADMIN — Medication 1 PACKET: at 11:10

## 2022-05-14 RX ADMIN — Medication 1 PACKET: at 21:04

## 2022-05-14 RX ADMIN — Medication 2 PACKET: at 11:10

## 2022-05-14 RX ADMIN — INSULIN ASPART 1 UNITS: 100 INJECTION, SOLUTION INTRAVENOUS; SUBCUTANEOUS at 16:47

## 2022-05-14 RX ADMIN — ACETAMINOPHEN 325MG 650 MG: 325 TABLET ORAL at 12:06

## 2022-05-14 RX ADMIN — ACETAMINOPHEN 325MG 650 MG: 325 TABLET ORAL at 17:50

## 2022-05-14 RX ADMIN — PIPERACILLIN AND TAZOBACTAM 3.38 G: 3; .375 INJECTION, POWDER, FOR SOLUTION INTRAVENOUS at 03:49

## 2022-05-14 RX ADMIN — OXYCODONE HYDROCHLORIDE 5 MG: 5 TABLET ORAL at 17:51

## 2022-05-14 RX ADMIN — HYOSCYAMINE SULFATE: 16 SOLUTION at 19:30

## 2022-05-14 RX ADMIN — SODIUM CHLORIDE, POTASSIUM CHLORIDE, SODIUM LACTATE AND CALCIUM CHLORIDE: 600; 310; 30; 20 INJECTION, SOLUTION INTRAVENOUS at 03:51

## 2022-05-14 RX ADMIN — FLUCONAZOLE 400 MG: 2 INJECTION INTRAVENOUS at 14:26

## 2022-05-14 RX ADMIN — PIPERACILLIN AND TAZOBACTAM 3.38 G: 3; .375 INJECTION, POWDER, FOR SOLUTION INTRAVENOUS at 10:49

## 2022-05-14 RX ADMIN — Medication 1 PACKET: at 14:24

## 2022-05-14 RX ADMIN — Medication 2 PACKET: at 21:04

## 2022-05-14 RX ADMIN — Medication 1 PACKET: at 17:51

## 2022-05-14 RX ADMIN — HYDROMORPHONE HYDROCHLORIDE 0.5 MG: 1 INJECTION, SOLUTION INTRAMUSCULAR; INTRAVENOUS; SUBCUTANEOUS at 08:59

## 2022-05-14 RX ADMIN — OXYCODONE HYDROCHLORIDE 5 MG: 5 TABLET ORAL at 12:05

## 2022-05-14 RX ADMIN — PIPERACILLIN AND TAZOBACTAM 3.38 G: 3; .375 INJECTION, POWDER, FOR SOLUTION INTRAVENOUS at 21:14

## 2022-05-14 ASSESSMENT — ACTIVITIES OF DAILY LIVING (ADL)
ADLS_ACUITY_SCORE: 24
ADLS_ACUITY_SCORE: 21
ADLS_ACUITY_SCORE: 24
ADLS_ACUITY_SCORE: 21
ADLS_ACUITY_SCORE: 21
ADLS_ACUITY_SCORE: 24
ADLS_ACUITY_SCORE: 21
ADLS_ACUITY_SCORE: 21
ADLS_ACUITY_SCORE: 24
ADLS_ACUITY_SCORE: 21

## 2022-05-14 NOTE — PLAN OF CARE
Status: Here for necrotizing soft tissue infection. Multiple washouts and tooth extractions. POD #1 I&D chest and neck wounds.   VS: VSS except for intermittent fevers and tachycardia. On RA.   Neuros: Intact.   GI: NPO with ice chips. Advance diet per speech. Goal of 7 cans TF per day thru G tube. Tolerating TF. 4 of 7 cans today.   : Voiding spontaneously.   Respiratory: WNL. Copious oral secretions, self suctions, swallows when able.   Skin: Neck incision L and medial covered with dressing, packing done per ENT. Sternal and R cwound sites covered with dressings WNL; redress BID per orders. Bruising throughout along with scabbing. Diffuse blanchable erythema noted near neck incision, marked. PEG site WNL.    IV: PIV SL.    Activity: Up independently in room.   Pain: Denies now, post-op c/o neck pain controlled with PRN oxy   Plan of care:  Continue to monitor fevers and possible CT scan to follow. Continue to monitor and follow current POC

## 2022-05-14 NOTE — PLAN OF CARE
Status: Here for necrotizing soft tissue infection. Multiple washouts and tooth extractions. POD #0 I&D.   VS: VSS on RA ex intermittent fevers (max 102.5) and tachycardia in 110s. Given PRN tylenol for temp and went down.  Neuros: Intact.   GI: NPO ex ice chips. Goal of 7 cans a day. Received 3/7 cans yesterday.  : Voiding spontaneously.   Respiratory: WNL. Good, strong cough. Copious oral secretions, self suctions.   Skin: Neck incision L and medial covered with dressing, packing done per ENT. L and R chest tube sites covered with dressings WNL. Bruising throughout along with scabbing. Diffuse blanchable erythema noted near neck incision, marked. PEG site WNL.    IV: PIV x2, one running LR at 100 mL/hr, one SL.   Activity: Up independently in room.   Pain: Denies   Plan: Continue to monitor and follow POC. Restarted on lovenox.

## 2022-05-14 NOTE — PROGRESS NOTES
"Thoracic Surgery  Progress Note  May 14, 2022    S: Intermittently febrile overnight to 102.5F. Patient states that he has felt feverish overnight but is doing okay otherwise. Denies any pain at this time.    O:  /59 (BP Location: Right arm)   Pulse 98   Temp (!) 101.1  F (38.4  C) (Oral)   Resp 16   Ht 1.9 m (6' 2.8\")   Wt 98.4 kg (216 lb 14.9 oz)   SpO2 96%   BMI 27.26 kg/m      Physical Exam:  Gen: NAD, awake, alert, laying in bed  Pulm: NLB ORA  Extr: wwp, no edema  Wounds: midline chest wound w/healthy granulation tissue and no surrounding erythema or purulence, right side chest wound healing well w/small amount of dried blood on dressing but no active bleeding or drainage    I&O:  I/O last 3 completed shifts:  In: 1983.33 [I.V.:903.33; NG/GT:360]  Out: 300 [Urine:300]      Labs and imaging:  Reviewed.       A: Robert Gallo is a 26 year old with no significant PMHx who was [place] [facility] [place] necrotizing fasciitis of neck with mediastinal involvement s/p emergent bilateral VATS, right anterior mediastinotomy, I&D of bilateral pharyngeal abscess, and PEG tube placement 5/4 with Thoracic and ENT. He is now POD#1 s/p RTOR for neck and chest I&D with ENT.    - Thoracic Surgery performed chest wound dressing change today  - Future dressing changes BID w/Nursing staff (can start this PM)  - Recommend CT to investigate possible causes of persistent fevers  - Remainder of cares per primary team      Patient seen and discussed with staff.      Angie Villasenor MD  PGY-1, General Surgery            "

## 2022-05-14 NOTE — PROGRESS NOTES
"Otolaryngology Progress Note  May 14, 2022    SUBJECTIVE: OR yesterday with Thoracic and ENT, continues to be fluctuating and febrile with TMax 102.5.     OBJECTIVE:   /56 (BP Location: Right arm)   Pulse 106   Temp 97.2  F (36.2  C) (Oral)   Resp 16   Ht 1.9 m (6' 2.8\")   Wt 98.4 kg (216 lb 14.9 oz)   SpO2 94%   BMI 27.26 kg/m     General: Alert and oriented x 3, No acute distress   HEENT: Neck wound with packing removed, no purulence in wound. Dakins soaked kerlix re-applied. Surrounding skin edges appear healthy with improving erythema. Intraoral wound on right probed deeply with no purulent return. Left oral wound probed with end of q-tip and no purulence returned. Chest wound with bandage managed by Thoracic.   Pulmonary: Breathing non-labored, no stridor, no accessory muscle use.      Intake/Output Summary (Last 24 hours) at 5/14/2022 1122  Last data filed at 5/14/2022 1030  Gross per 24 hour   Intake 1990 ml   Output 550 ml   Net 1440 ml     LABS:  ROUTINE IP LABS (Last four results)  BMP  Recent Labs   Lab 05/14/22  0809 05/14/22  0703 05/14/22  0344 05/13/22  2346 05/13/22  1053 05/13/22  0849 05/12/22  0826 05/12/22  0418 05/11/22  0349 05/11/22  0345   NA  --  137  --   --   --  137  --  138  --  141   POTASSIUM  --  4.0  --   --   --  3.9  --  3.8  --  3.6   CHLORIDE  --  106  --   --   --  106  --  107  --  110*   OLYA  --  8.7  --   --   --  8.8  --  8.3*  --  8.2*   CO2  --  23  --   --   --  26  --  25  --  26   BUN  --  16  --   --   --  17  --  16  --  16   CR  --  0.84  --   --   --  0.96  --  0.74  --  0.78   * 127* 120* 165*   < > 104*   < > 130*   < > 151*    < > = values in this interval not displayed.     CBC  Recent Labs   Lab 05/14/22  0703 05/13/22  0849 05/12/22  0418 05/11/22  0345   WBC 16.4* 11.5* 11.3* 9.2   RBC 3.63* 3.75* 3.68* 3.50*   HGB 10.9* 11.1* 10.9* 10.2*   HCT 33.9* 35.6* 34.9* 34.0*   MCV 93 95 95 97   MCH 30.0 29.6 29.6 29.1   MCHC 32.2 31.2* 31.2* " 30.0*   RDW 13.6 14.1 14.1 14.3    410 344 328     INRNo lab results found in last 7 days.    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    Neuro:  - Pain control: tylenol, oxycodone prn, dilaudid prn for dressing changes.  - Discussed addiction medicine consultation and patient is not currently interested. Will refrain from placing order at this time.    HEENT:  - ENT to perform BID packing changes to anterior neck and probing of intraoral wounds. Please keep dakin's solution and kerlix at bedside  - Thoracic surgery to manage inferior chest wound and PEG tube, please contact team with questions    Respiratory:  - supplemental O2 PRN to keep sats >92%    CV/heme:  - hemodynamically stable    FEN/GI:  - G-Tube, Diet per SLP  - Tolerating bolus TF, appreciate recommendations per nutrition  - PRN antiemetics   - Bowel regimen: senna BID    :  - voiding independently    Endo  - No acute concerns  - Sliding scale    ID:  - Necrotizing infection  - Currently on zosyn and fluconazole, continues to remain febrile with picket-fencing  - ID consulted, appreciate recommendations    PPX:  - Lovenox  - SCDs  - IS    Dispo: Pending resolution of fevers    -- Patient and above plan discussed with Dr Bao Gonzalez MD  Otolaryngology Head and Neck Surgery Resident, PGY-2  Please page on call Otolaryngology resident with questions.

## 2022-05-15 LAB
ABO/RH(D): NORMAL
ALBUMIN SERPL-MCNC: 2.4 G/DL (ref 3.4–5)
ALP SERPL-CCNC: 176 U/L (ref 40–150)
ALT SERPL W P-5'-P-CCNC: 130 U/L (ref 0–70)
ANION GAP SERPL CALCULATED.3IONS-SCNC: 9 MMOL/L (ref 3–14)
ANION GAP SERPL CALCULATED.3IONS-SCNC: 9 MMOL/L (ref 3–14)
ANTIBODY SCREEN: NEGATIVE
AST SERPL W P-5'-P-CCNC: 59 U/L (ref 0–45)
BILIRUB SERPL-MCNC: 0.5 MG/DL (ref 0.2–1.3)
BUN SERPL-MCNC: 14 MG/DL (ref 7–30)
BUN SERPL-MCNC: 14 MG/DL (ref 7–30)
CALCIUM SERPL-MCNC: 8.7 MG/DL (ref 8.5–10.1)
CALCIUM SERPL-MCNC: 8.7 MG/DL (ref 8.5–10.1)
CHLORIDE BLD-SCNC: 104 MMOL/L (ref 94–109)
CHLORIDE BLD-SCNC: 104 MMOL/L (ref 94–109)
CO2 SERPL-SCNC: 22 MMOL/L (ref 20–32)
CO2 SERPL-SCNC: 22 MMOL/L (ref 20–32)
CREAT SERPL-MCNC: 0.68 MG/DL (ref 0.66–1.25)
CREAT SERPL-MCNC: 0.76 MG/DL (ref 0.66–1.25)
CRP SERPL-MCNC: 120 MG/L (ref 0–8)
ERYTHROCYTE [DISTWIDTH] IN BLOOD BY AUTOMATED COUNT: 14 % (ref 10–15)
ERYTHROCYTE [SEDIMENTATION RATE] IN BLOOD BY WESTERGREN METHOD: 82 MM/HR (ref 0–15)
GFR SERPL CREATININE-BSD FRML MDRD: >90 ML/MIN/1.73M2
GFR SERPL CREATININE-BSD FRML MDRD: >90 ML/MIN/1.73M2
GGT SERPL-CCNC: 234 U/L (ref 0–75)
GLUCOSE BLD-MCNC: 112 MG/DL (ref 70–99)
GLUCOSE BLD-MCNC: 112 MG/DL (ref 70–99)
GLUCOSE BLDC GLUCOMTR-MCNC: 104 MG/DL (ref 70–99)
GLUCOSE BLDC GLUCOMTR-MCNC: 108 MG/DL (ref 70–99)
GLUCOSE BLDC GLUCOMTR-MCNC: 123 MG/DL (ref 70–99)
GLUCOSE BLDC GLUCOMTR-MCNC: 134 MG/DL (ref 70–99)
GLUCOSE BLDC GLUCOMTR-MCNC: 140 MG/DL (ref 70–99)
GLUCOSE BLDC GLUCOMTR-MCNC: 147 MG/DL (ref 70–99)
GLUCOSE BLDC GLUCOMTR-MCNC: 99 MG/DL (ref 70–99)
HCT VFR BLD AUTO: 34.4 % (ref 40–53)
HGB BLD-MCNC: 10.5 G/DL (ref 13.3–17.7)
LACTATE SERPL-SCNC: 0.8 MMOL/L (ref 0.7–2)
MAGNESIUM SERPL-MCNC: 2.4 MG/DL (ref 1.6–2.3)
MCH RBC QN AUTO: 29.2 PG (ref 26.5–33)
MCHC RBC AUTO-ENTMCNC: 30.5 G/DL (ref 31.5–36.5)
MCV RBC AUTO: 96 FL (ref 78–100)
PHOSPHATE SERPL-MCNC: 3.4 MG/DL (ref 2.5–4.5)
PLATELET # BLD AUTO: 447 10E3/UL (ref 150–450)
POTASSIUM BLD-SCNC: 4 MMOL/L (ref 3.4–5.3)
POTASSIUM BLD-SCNC: 4 MMOL/L (ref 3.4–5.3)
PROT SERPL-MCNC: 8.2 G/DL (ref 6.8–8.8)
RBC # BLD AUTO: 3.59 10E6/UL (ref 4.4–5.9)
SODIUM SERPL-SCNC: 135 MMOL/L (ref 133–144)
SODIUM SERPL-SCNC: 135 MMOL/L (ref 133–144)
SPECIMEN EXPIRATION DATE: NORMAL
WBC # BLD AUTO: 9.7 10E3/UL (ref 4–11)

## 2022-05-15 PROCEDURE — 84100 ASSAY OF PHOSPHORUS: CPT | Performed by: STUDENT IN AN ORGANIZED HEALTH CARE EDUCATION/TRAINING PROGRAM

## 2022-05-15 PROCEDURE — 80053 COMPREHEN METABOLIC PANEL: CPT | Performed by: STUDENT IN AN ORGANIZED HEALTH CARE EDUCATION/TRAINING PROGRAM

## 2022-05-15 PROCEDURE — 86140 C-REACTIVE PROTEIN: CPT | Performed by: STUDENT IN AN ORGANIZED HEALTH CARE EDUCATION/TRAINING PROGRAM

## 2022-05-15 PROCEDURE — 83605 ASSAY OF LACTIC ACID: CPT | Performed by: STUDENT IN AN ORGANIZED HEALTH CARE EDUCATION/TRAINING PROGRAM

## 2022-05-15 PROCEDURE — 250N000011 HC RX IP 250 OP 636: Performed by: STUDENT IN AN ORGANIZED HEALTH CARE EDUCATION/TRAINING PROGRAM

## 2022-05-15 PROCEDURE — 80053 COMPREHEN METABOLIC PANEL: CPT

## 2022-05-15 PROCEDURE — 85018 HEMOGLOBIN: CPT | Performed by: STUDENT IN AN ORGANIZED HEALTH CARE EDUCATION/TRAINING PROGRAM

## 2022-05-15 PROCEDURE — 250N000013 HC RX MED GY IP 250 OP 250 PS 637: Performed by: STUDENT IN AN ORGANIZED HEALTH CARE EDUCATION/TRAINING PROGRAM

## 2022-05-15 PROCEDURE — 999N000128 HC STATISTIC PERIPHERAL IV START W/O US GUIDANCE

## 2022-05-15 PROCEDURE — 99222 1ST HOSP IP/OBS MODERATE 55: CPT | Performed by: INTERNAL MEDICINE

## 2022-05-15 PROCEDURE — 82977 ASSAY OF GGT: CPT | Performed by: PHYSICIAN ASSISTANT

## 2022-05-15 PROCEDURE — 83735 ASSAY OF MAGNESIUM: CPT | Performed by: STUDENT IN AN ORGANIZED HEALTH CARE EDUCATION/TRAINING PROGRAM

## 2022-05-15 PROCEDURE — 82374 ASSAY BLOOD CARBON DIOXIDE: CPT | Performed by: STUDENT IN AN ORGANIZED HEALTH CARE EDUCATION/TRAINING PROGRAM

## 2022-05-15 PROCEDURE — 99207 PR APP CREDIT; MD BILLING SHARED VISIT: CPT | Performed by: PHYSICIAN ASSISTANT

## 2022-05-15 PROCEDURE — 36415 COLL VENOUS BLD VENIPUNCTURE: CPT | Performed by: PHYSICIAN ASSISTANT

## 2022-05-15 PROCEDURE — 86901 BLOOD TYPING SEROLOGIC RH(D): CPT | Performed by: STUDENT IN AN ORGANIZED HEALTH CARE EDUCATION/TRAINING PROGRAM

## 2022-05-15 PROCEDURE — 258N000003 HC RX IP 258 OP 636: Performed by: STUDENT IN AN ORGANIZED HEALTH CARE EDUCATION/TRAINING PROGRAM

## 2022-05-15 PROCEDURE — 36415 COLL VENOUS BLD VENIPUNCTURE: CPT | Performed by: STUDENT IN AN ORGANIZED HEALTH CARE EDUCATION/TRAINING PROGRAM

## 2022-05-15 PROCEDURE — 120N000002 HC R&B MED SURG/OB UMMC

## 2022-05-15 PROCEDURE — 85652 RBC SED RATE AUTOMATED: CPT | Performed by: STUDENT IN AN ORGANIZED HEALTH CARE EDUCATION/TRAINING PROGRAM

## 2022-05-15 PROCEDURE — 87040 BLOOD CULTURE FOR BACTERIA: CPT | Performed by: PHYSICIAN ASSISTANT

## 2022-05-15 PROCEDURE — 250N000011 HC RX IP 250 OP 636: Performed by: PHYSICIAN ASSISTANT

## 2022-05-15 RX ORDER — SODIUM CHLORIDE, SODIUM LACTATE, POTASSIUM CHLORIDE, CALCIUM CHLORIDE 600; 310; 30; 20 MG/100ML; MG/100ML; MG/100ML; MG/100ML
INJECTION, SOLUTION INTRAVENOUS CONTINUOUS
Status: DISCONTINUED | OUTPATIENT
Start: 2022-05-16 | End: 2022-05-16

## 2022-05-15 RX ORDER — CYCLOBENZAPRINE HCL 5 MG
5 TABLET ORAL EVERY 8 HOURS PRN
Status: DISCONTINUED | OUTPATIENT
Start: 2022-05-15 | End: 2022-05-26 | Stop reason: HOSPADM

## 2022-05-15 RX ORDER — HYDROXYZINE HCL 10 MG/5 ML
25 SOLUTION, ORAL ORAL EVERY 8 HOURS PRN
Status: DISCONTINUED | OUTPATIENT
Start: 2022-05-15 | End: 2022-05-26 | Stop reason: HOSPADM

## 2022-05-15 RX ORDER — AMPICILLIN AND SULBACTAM 2; 1 G/1; G/1
3 INJECTION, POWDER, FOR SOLUTION INTRAMUSCULAR; INTRAVENOUS EVERY 6 HOURS
Status: DISCONTINUED | OUTPATIENT
Start: 2022-05-15 | End: 2022-05-26

## 2022-05-15 RX ORDER — CYCLOBENZAPRINE HCL 5 MG
5 TABLET ORAL ONCE
Status: COMPLETED | OUTPATIENT
Start: 2022-05-15 | End: 2022-05-15

## 2022-05-15 RX ADMIN — ACETAMINOPHEN 325MG 650 MG: 325 TABLET ORAL at 16:26

## 2022-05-15 RX ADMIN — SENNOSIDES AND DOCUSATE SODIUM 2 TABLET: 8.6; 5 TABLET ORAL at 19:33

## 2022-05-15 RX ADMIN — OXYCODONE HYDROCHLORIDE 5 MG: 5 TABLET ORAL at 00:40

## 2022-05-15 RX ADMIN — AMPICILLIN SODIUM AND SULBACTAM SODIUM 3 G: 2; 1 INJECTION, POWDER, FOR SOLUTION INTRAMUSCULAR; INTRAVENOUS at 21:47

## 2022-05-15 RX ADMIN — Medication 6 MG: at 19:33

## 2022-05-15 RX ADMIN — SENNOSIDES AND DOCUSATE SODIUM 2 TABLET: 8.6; 5 TABLET ORAL at 07:43

## 2022-05-15 RX ADMIN — INSULIN ASPART 1 UNITS: 100 INJECTION, SOLUTION INTRAVENOUS; SUBCUTANEOUS at 19:44

## 2022-05-15 RX ADMIN — Medication 1 PACKET: at 07:51

## 2022-05-15 RX ADMIN — ACETAMINOPHEN 325MG 650 MG: 325 TABLET ORAL at 19:33

## 2022-05-15 RX ADMIN — HYDROXYZINE HYDROCHLORIDE 25 MG: 10 SOLUTION ORAL at 23:08

## 2022-05-15 RX ADMIN — AMPICILLIN SODIUM AND SULBACTAM SODIUM 3 G: 2; 1 INJECTION, POWDER, FOR SOLUTION INTRAMUSCULAR; INTRAVENOUS at 11:18

## 2022-05-15 RX ADMIN — Medication 1 PACKET: at 13:43

## 2022-05-15 RX ADMIN — Medication 2 PACKET: at 19:34

## 2022-05-15 RX ADMIN — Medication 2 PACKET: at 07:52

## 2022-05-15 RX ADMIN — ENOXAPARIN SODIUM 90 MG: 100 INJECTION SUBCUTANEOUS at 14:12

## 2022-05-15 RX ADMIN — ENOXAPARIN SODIUM 90 MG: 100 INJECTION SUBCUTANEOUS at 00:21

## 2022-05-15 RX ADMIN — Medication 1 PACKET: at 19:34

## 2022-05-15 RX ADMIN — ONDANSETRON 4 MG: 2 INJECTION INTRAMUSCULAR; INTRAVENOUS at 12:23

## 2022-05-15 RX ADMIN — CYCLOBENZAPRINE HYDROCHLORIDE 5 MG: 5 TABLET, FILM COATED ORAL at 19:33

## 2022-05-15 RX ADMIN — HYOSCYAMINE SULFATE: 16 SOLUTION at 07:43

## 2022-05-15 RX ADMIN — FLUCONAZOLE 400 MG: 2 INJECTION INTRAVENOUS at 14:30

## 2022-05-15 RX ADMIN — PIPERACILLIN AND TAZOBACTAM 3.38 G: 3; .375 INJECTION, POWDER, FOR SOLUTION INTRAVENOUS at 04:00

## 2022-05-15 RX ADMIN — AMPICILLIN SODIUM AND SULBACTAM SODIUM 3 G: 2; 1 INJECTION, POWDER, FOR SOLUTION INTRAMUSCULAR; INTRAVENOUS at 17:34

## 2022-05-15 RX ADMIN — CYCLOBENZAPRINE HYDROCHLORIDE 5 MG: 5 TABLET, FILM COATED ORAL at 16:26

## 2022-05-15 RX ADMIN — ENOXAPARIN SODIUM 90 MG: 100 INJECTION SUBCUTANEOUS at 23:03

## 2022-05-15 RX ADMIN — SODIUM CHLORIDE, POTASSIUM CHLORIDE, SODIUM LACTATE AND CALCIUM CHLORIDE: 600; 310; 30; 20 INJECTION, SOLUTION INTRAVENOUS at 23:03

## 2022-05-15 RX ADMIN — Medication 1 PACKET: at 16:30

## 2022-05-15 ASSESSMENT — ACTIVITIES OF DAILY LIVING (ADL)
ADLS_ACUITY_SCORE: 24
ADLS_ACUITY_SCORE: 22
ADLS_ACUITY_SCORE: 24
ADLS_ACUITY_SCORE: 22
ADLS_ACUITY_SCORE: 24
ADLS_ACUITY_SCORE: 22
ADLS_ACUITY_SCORE: 22
ADLS_ACUITY_SCORE: 24

## 2022-05-15 NOTE — PLAN OF CARE
Status: Here for necrotizing soft tissue infection. Multiple washouts and tooth extractions. POD #2 I&D chest and neck wounds.   VS: VSS except for intermittent fevers and tachycardia. On RA. Tmax 101.1 MD aware.   Neuros: Intact.   GI: NPO. Goal of 7 cans TF per day thru G tube. Tolerating TF. 6 of 7 cans today. Emesis x1 this afternoon.   : Voiding spontaneously.   Respiratory: WNL. Copious oral secretions, self suctions, swallows when able.   Skin: Neck incision L and medial covered with dressing, packing done per ENT. Sternal and R wound sites covered with dressings WNL; redress BID per orders. Bruising throughout along with scabbing. Diffuse blanchable erythema noted near neck incision, marked. PEG site WNL.    IV: PIV SL.    Activity: Up independently in room.   Pain: Flexeril for neck pain.    Plan of care:  Continue to monitor fevers. Continue to monitor and follow current POC

## 2022-05-15 NOTE — PROGRESS NOTES
"Otolaryngology Progress Note  May 15, 2022    SUBJECTIVE: Tmax 100.5 this AM, otherwise largely afebrile since 5/14 at 3AM. Denies fevers, chills, shortness of breath. Doing well.    OBJECTIVE:   /66 (BP Location: Right arm)   Pulse 82   Temp (!) 100.5  F (38.1  C) (Oral)   Resp 16   Ht 1.9 m (6' 2.8\")   Wt 98.4 kg (216 lb 14.9 oz)   SpO2 96%   BMI 27.26 kg/m     General: Alert and oriented x 3, No acute distress   HEENT: Neck wound with packing removed, no purulence in wound. Dakins soaked kerlix re-applied. Surrounding skin edges appear healthy with improving erythema. Intraoral wound on right probed deeply with no purulent return. Left oral wound probed with end of q-tip and no purulence returned. Chest wound with bandage managed by Thoracic.   Pulmonary: Breathing non-labored, no stridor, no accessory muscle use.      Intake/Output Summary (Last 24 hours) at 5/14/2022 1122  Last data filed at 5/14/2022 1030  Gross per 24 hour   Intake 1990 ml   Output 550 ml   Net 1440 ml     LABS:  ROUTINE IP LABS (Last four results)  BMP  Recent Labs   Lab 05/15/22  0808 05/15/22  0748 05/15/22  0425 05/15/22  0017 05/14/22  0809 05/14/22  0703 05/13/22  1053 05/13/22  0849 05/12/22  0826 05/12/22  0418     --   --   --   --  137  --  137  --  138   POTASSIUM 4.0  --   --   --   --  4.0  --  3.9  --  3.8   CHLORIDE 104  --   --   --   --  106  --  106  --  107   OLYA 8.7  --   --   --   --  8.7  --  8.8  --  8.3*   CO2 22  --   --   --   --  23  --  26  --  25   BUN 14  --   --   --   --  16  --  17  --  16   CR 0.76  --   --   --   --  0.84  --  0.96  --  0.74   * 123* 108* 134*   < > 127*   < > 104*   < > 130*    < > = values in this interval not displayed.     CBC  Recent Labs   Lab 05/15/22  0808 05/14/22  0703 05/13/22  0849 05/12/22  0418   WBC 9.7 16.4* 11.5* 11.3*   RBC 3.59* 3.63* 3.75* 3.68*   HGB 10.5* 10.9* 11.1* 10.9*   HCT 34.4* 33.9* 35.6* 34.9*   MCV 96 93 95 95   MCH 29.2 30.0 29.6 " 29.6   MCHC 30.5* 32.2 31.2* 31.2*   RDW 14.0 13.6 14.1 14.1    415 410 344     INRNo lab results found in last 7 days.    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    Neuro:  - Pain control: tylenol, oxycodone prn, dilaudid prn for dressing changes.  - Discussed addiction medicine consultation and patient is not currently interested. Will refrain from placing order at this time.    HEENT:  - ENT to perform BID packing changes to anterior neck and probing of intraoral wounds. Please keep dakin's solution and kerlix at bedside  - Thoracic surgery to manage inferior chest wound and PEG tube, please contact team with questions    Respiratory:  - supplemental O2 PRN to keep sats >92%    CV/heme:  - hemodynamically stable    FEN/GI:  - G-Tube, NPO  - Cleared for FLD by SLP however given his clinical status, holding off on PO at this time. Nutrition per G-tube.  - Tolerating bolus TF, appreciate recommendations per nutrition  - PRN antiemetics   - Bowel regimen: senna BID    :  - voiding independently    Endo  - No acute concerns  - Sliding scale    ID:  - Necrotizing infection- improving WBC and CRP this AM. Will continue to closely clinically monitor. CMP added to evaluate hepatic function. If worsening fevers or clinical status will consider CT Neck/Chest/Abdomen to rule out abdominal source as well.   - Transitioned to unasyn and fluconazole  - ID consulted, appreciate recommendations    PPX:  - Lovenox  - SCDs  - IS    Dispo: Pending resolution of  fevers    -- Patient and above plan discussed with Dr Bao Gonzalez MD  Otolaryngology Head and Neck Surgery Resident, PGY-2  Please page on call Otolaryngology resident with questions.

## 2022-05-15 NOTE — CONSULTS
"[facility]  Consult Note - Hospitalist Service, GOLD TEAM 1  Date of Admission:  5/4/2022  Consult Requested by: Dr. Margarita Jerome  Reason for Consult: \"Transaminitis, persistent fevers in setting of nec fasc and good neck/chest source control\"     Assessment & Plan   Robert Gallo is a 26 year old male with no significant past medical history who initially presented as [place] [facility] [place] of necrotizing fasciitis of neck with mediastinal involvement. He is now s/p procedures as below.  Internal medicine is consulted for evaluation of possible other sites of infection in setting of ongoing fevers, transaminitis.     Recommendations:   -Obtain blood cultures (ordered for you)  -Agree with CT Neck/Chest to evaluate previous sites of infection and r/o aspiration pneumonia given increase in oral secretions  -Consider CT PE to rule out PE in setting of fevers   -Low suspicion for intra-abdominal infection at this time.     Necrotizing Fasciitis of Neck with Mediastinal Involvement s/p Emergent VATS, Right Anterior Mediastinotomy, and I&D of Bilateral Parapharyngeal Spaces, Bilateral Neck, and Sublingual Space- Patient presented as transfer from [facility] with necrotizing fasciitis of neck/mediastinum following a dental infection. Initial debridement on 5/4, with repeat oral and neck explorations on 5/13 due to persistent purulence and fevers. Patient with a polymicrobial infection growing Strep anginosus, Staph epidermidis, Prevotella, Carlyn dubliniensis. He is currently on Unasyn and fluconazole per ID recs. CRP remains elevated at 120, continues to have fevers. Patient endorses increased neck pain and oral secretions over the past several days.   -ENT primary  -Thoracic Surgery and Infectious Disease following   -Agree with CT Neck/Chest for evaluation of previous abscess, r/o aspiration pneumonia in setting of increased oral secretions and dysphagia.   -Add blood " cultures   -Continue wound cares per ENT  -DVT prophylaxis: Lovenox     Transaminitis - alk phos 176, , AST 59.  Uptrending from 5/9. Previous liver studies WNLs on 5/3. Hepatitis serologies negative, HIV non-reactive. Patient w/o RUQ abdominal pain, only endorses some discomfort at PEG tube site.  PEG site does not appear acutely infected. Suspect transaminitis 2/2 to Unasyn use. Low suspicion for intra-abdominal infection at this time  -Add on GGT   -Repeat CMP in 2 days     Acute Hypoxic Respiratory Failure, resolved - Intubated 5/3 - 5/9, currently on RA. Respiratory failure 2/2 upper airway obstruction from above infections with pulmonary edema contributing. Received diuresis while in ICU.   -CT Chest to rule out possible aspiration pneumonia as above.    Acute Blood Loss Anemia, resolved - Hgb 10.5, improving. Has not required blood transfusions.   -CBC daily     Poor Dentition s/p Extraction of Teeth #19, #20 - Patient with poor dentition and large areas of decay. Patient recently treated for dental infection prior to presentation at [facility].   -OMFS following remotely   -Agree with Adult Dentistry consult for oral evaluation and possible future dental extraction if indicated.     Severe Protein-Calorie Malnutrition - Per chart review, patient with significant weight loss in weeks prior to admission.   -Nutrition following     Methamphetamine Use - Patient notes intermittent use, declines inpatient chemical dependency/Addiction medicine consultations at this time.        The patient's care was discussed with the Attending Physician, Dr. Dr. Reyes.    Jessica Zelaya PA-C  [facility]  Securely message with the Vocera Web Console (learn more here)  Text page via [employer] Paging/Directory   Please see signed in provider for up to date coverage information    Hospitalist Service, GOLD TEAM 1    Clinically Significant Risk Factors Present on  "Admission                    ______________________________________________________________________    Chief Complaint   Mouth and Throat Pain     History is obtained from the patient and patient's chart    History of Present Illness   Robert Gallo is a 26 year old male with a PMH as listed above who is admitted for necrotizing fasciitis of neck with mediastinal involvement. Patient initially presented to [facility] 5/3 with odynophagia and dysphagia following a several day history of dental pain.  He was found to have a \"necrotizing infection in the left submandibular region and floor of the mouth which extended inferiorly into the soft tissues of the anterior lateral left neck to the level of the thyroid.\" During his evaluation there, he develop dyspnea and hypoxia and was ultimately intubated for airway protection.  [place] [facility] antibiotics were broadened there but he was ultimately then transferred to [facility] due to lack of thoracic surgery at their facility. Patient is now s/p above procedures however continues to have fevers.  He endorses some difficulty swallowing and an increase in oral secretions.      He denies abdominal pain, but notes some discomfort at PEG tube site. He denies diarrhea.  He denies chest pain or dyspnea.     Review of Systems   The 10 point Review of Systems is negative other than noted in the HPI or here.     Past Medical History    I have reviewed this patient's medical history and updated it with pertinent information if needed.   Past Medical History:   Diagnosis Date     No known problems        Past Surgical History   I have reviewed this patient's surgical history and updated it with pertinent information if needed.  Past Surgical History:   Procedure Laterality Date     IRRIGATION AND DEBRIDEMENT NECK, COMBINED N/A 5/4/2022    Procedure: INCISION AND DRAINAGE PARAPHARYNGEAL ABCESS ON RIGHT AND LEFT;  Surgeon: Milena Gore MD;  Location: [facility]" OR     PICC DOUBLE LUMEN PLACEMENT Left 05/04/2022    Left basilic, 47 cm, 1 external length     THORACOSCOPY N/A 5/4/2022    Procedure: BILATERAL VIDEO ASSISTED THORACOTOMY. RIGHT ANTERIOR MEDIASTINOTYOMY. PEG TUBE.;  Surgeon: Abundio Patel MD;  Location: [facility] OR     THORACOSCOPY  5/4/2022    Procedure: ;  Surgeon: Abundio Patel MD;  Location: [facility] OR       Social History   I have reviewed this patient's social history and updated it with pertinent information if needed.       Family History   I have reviewed this patient's family history and updated it with pertinent information if needed.  Family History   Problem Relation Age of Onset     No Known Problems Mother      No Known Problems Father        Medications   No medications prior to admission.       Allergies   No Known Allergies    Physical Exam   Vital Signs: Temp: (!) 100.5  F (38.1  C) Temp src: Oral BP: 126/66 Pulse: 82   Resp: 16 SpO2: 96 % O2 Device: None (Room air)    Weight: 216 lbs 14.92 oz    GENERAL: Alert and oriented x 3. NAD. Ambulatory. Cooperative.   HEENT: Anicteric sclera. Mucous membranes moist. NC. AT. Wound dressings in place  CV: RRR. S1, S2. No murmurs appreciated.   RESPIRATORY: Effort normal on RA. Lungs CTAB with no wheezing, rales, rhonchi.   GI: Abdomen soft and non distended with normoactive bowel sounds present in all quadrants. No tenderness, rebound, guarding. No lesions. PEG tube in place w/o surrounding erythema or drainage.   NEUROLOGICAL: No focal deficits. Moves all extremities.  CN 2-12 grossly intact.  EXTREMITIES: No peripheral edema. Intact bilateral pedal pulses.   SKIN: No jaundice. No rashes.      Data    Latest Reference Range & Units 05/15/22 08:08   Sodium 133 - 144 mmol/L  133 - 144 mmol/L 135  135   Potassium 3.4 - 5.3 mmol/L  3.4 - 5.3 mmol/L 4.0  4.0   Chloride 94 - 109 mmol/L  94 - 109 mmol/L 104  104   Carbon Dioxide 20 - 32 mmol/L  20 - 32 mmol/L 22  22   Urea Nitrogen 7 - 30 mg/dL  7  - 30 mg/dL 14  14   Creatinine 0.66 - 1.25 mg/dL  0.66 - 1.25 mg/dL 0.68  0.76   GFR Estimate >60 mL/min/1.73m2  >60 mL/min/1.73m2 >90 [1]  >90 [2]   Calcium 8.5 - 10.1 mg/dL  8.5 - 10.1 mg/dL 8.7  8.7   Anion Gap 3 - 14 mmol/L  3 - 14 mmol/L 9  9   Magnesium 1.6 - 2.3 mg/dL 2.4 (H)   Phosphorus 2.5 - 4.5 mg/dL 3.4   Albumin 3.4 - 5.0 g/dL 2.4 (L)   Protein Total 6.8 - 8.8 g/dL 8.2   Bilirubin Total 0.2 - 1.3 mg/dL 0.5   Alkaline Phosphatase 40 - 150 U/L 176 (H)   ALT 0 - 70 U/L 130 (H)   AST 0 - 45 U/L 59 (H)   CRP Inflammation 0.0 - 8.0 mg/L 120.0 (H)   Lactic Acid 0.7 - 2.0 mmol/L 0.8      Latest Reference Range & Units 05/15/22 08:08   WBC 4.0 - 11.0 10e3/uL 9.7   Hemoglobin 13.3 - 17.7 g/dL 10.5 (L)   Hematocrit 40.0 - 53.0 % 34.4 (L)   Platelet Count 150 - 450 10e3/uL 447   RBC Count 4.40 - 5.90 10e6/uL 3.59 (L)   MCV 78 - 100 fL 96   MCH 26.5 - 33.0 pg 29.2   MCHC 31.5 - 36.5 g/dL 30.5 (L)   RDW 10.0 - 15.0 % 14.0   Sed Rate 0 - 15 mm/hr 82 (H)

## 2022-05-15 NOTE — PROGRESS NOTES
General ID [facility]: Follow-up Note      Patient:  Robert Gallo, Date of birth 1995, Medical record number [id number]  Date of Visit:  May 9, 2022  Reason for consult: necrotizing neck infection         Assessment and Recommendations:     ID Problem list:  1. Necrotizing soft tissue infection of neck and mediastinum  2. S/p neck and mediastinum debridement 5/4/22 by ENT and thoracic surgery  3. Polymicrobial infection with prelim Strep anginosus, Staph epidermidis, Prevotella, Carlyn dubliniensis  4. Fevers  5. Dental caries, s/p tooth extractions 5/4  6. Methamphetamine use, inhaled    Recs:  1. Consider narrowing to Unasyn 3 grams IV Q 6 hours, all identified bacteria susceptible  2. Continue fluconazole  3.   Concern for possible ongoing mediastinitis since fevers persist after neck abscess drainage by ENT.  4.   Agree with thoracic surgery to obtain follow-up CT chest and neck to rule out persistent undrained neck or mediastinal abscess if persistent fevers.    Discussion:  Robert is a 26M with PMH including dental caries who was admitted 5/4 due to necrotizing soft tissue infection of the neck and mediastinum, now s/p debridement by ENT and thoracic surgery 5/4 and s/p VATS/chest tubes and PEG and also extraction of multiple teeth. His OR cultures have grown polymicrobial bacteria thus far including Strep anginosus, Staph epidermidis, and Prevotella, and candida. Currently on Zosyn and fluconazole.     Thank you for allowing us to participate in the care of this patient. ID will continue to follow.    Carla Arevalo MD  ID staff physician  Pager [phone]         Interval History:     Patient is sitting up in bed sipping some liquids. States he had pain in his throat overnight. He also had fevers.          Review of Systems:     8 point ROS negative unless noted above         Current Antimicrobials     IV Zosyn  Fluconazole         Physical Exam:   Ranges for vital signs:  Temp:  [97.2  F (36.2   C)-102.5  F (39.2  C)] 98.9  F (37.2  C)  Pulse:  [] 94  Resp:  [16] 16  BP: (121-131)/(56-68) 129/68  SpO2:  [94 %-98 %] 98 %    Exam:  GENERAL:  well-developed, well-nourished, sitting in bed in no acute distress.   ENT/NECK:  Head is normocephalic, atraumatic. Extubated and communicative. +surgical wounds down left neck to upper chest with serosanguinous fluid on dressings.   LUNGS:  Breathing comfortably on room air. Basilar crackles  CARDIOVASCULAR:  Regular rate and rhythm. No murmurs.   ABDOMEN:  Non-distended, soft, nontender.  EXT: Extremities warm and well perfused.  SKIN:  No acute rashes on extremities.   NEUROLOGIC:  Awake and orientated  LINES: PIVs without erythema or drainage         Laboratory Data:       Inflammatory Markers    Recent Labs   Lab Test 05/14/22  0703 05/13/22  0849 05/12/22  0418 05/11/22  0345 05/10/22  0557 05/09/22  0730 05/08/22  0456 05/07/22  0454   SED 74* 81* 91* 98* 96* 96* 101* 113*   .0* 100.0* 120.0* 150.0* 150.0* 160.0* 140.0* 160.0*       Hematology Studies    Recent Labs   Lab Test 05/14/22  0703 05/13/22  0849 05/12/22  0418 05/11/22  0345 05/10/22  0557 05/09/22  2210   WBC 16.4* 11.5* 11.3* 9.2 8.3 8.1   HGB 10.9* 11.1* 10.9* 10.2* 10.2* 10.2*   MCV 93 95 95 97 95 95    410 344 328 328 333       Metabolic Studies     Recent Labs   Lab Test 05/14/22  0703 05/13/22  0849 05/12/22  0418 05/11/22  0345 05/10/22  0557    137 138 141 142   POTASSIUM 4.0 3.9 3.8 3.6 3.4   CHLORIDE 106 106 107 110* 109   CO2 23 26 25 26 26   BUN 16 17 16 16 18   CR 0.84 0.96 0.74 0.78 0.73   GFRESTIMATED >90 >90 >90 >90 >90       Hepatic Studies    Recent Labs   Lab Test 05/09/22  0730 05/08/22  0456 05/04/22  0557   BILITOTAL 0.6 0.6 1.5*   ALKPHOS 164* 185* 93   ALBUMIN 1.9* 1.8* 2.0*   AST 75* 120* 32   * 126* 26       Microbiology:  Culture   Date Value Ref Range Status   05/09/2022 No growth after 4 days  Preliminary   05/09/2022 No growth after 4 days   Preliminary   2022 No Growth  Final   2022 No Growth  Final   2022 2+ Mixed Aerobic and Anaerobic roslyn  Final   2022 1+ Normal roslyn  Final   2022 4+ Carlyn dubliniensis (A)  Final     Comment:     Susceptibilities not routinely done   2022 3+ Carlyn dubliniensis (A)  Final   2022 1+ Carlyn dubliniensis (A)  Final     Comment:     Susceptibilities not routinely done   2022 No Growth  Final   2022 No Growth  Final   2022 1+ Prevotella species (A)  Final     Comment:     Susceptibilities not routinely done   2022 2+ Mixed Aerobic and Anaerobic roslyn  Final   2022 Carlyn dubliniensis (A)  Preliminary   2022 1+ Streptococcus anginosus (A)  Final     Comment:     This organism is susceptible to ampicillin, penicillin, vancomycin and the cephalosporins. If treatment is required and your patient is allergic to penicillin, contact the microbiology lab within 5 days to request susceptibility testing.   2022 1+ Normal roslyn  Final   2022 2+ Prevotella species (A)  Final     Comment:     Susceptibilities not routinely done   2022 2+ Prevotella species (A)  Final     Comment:     Susceptibilities not routinely done   2022 4+ Mixed Aerobic and Anaerobic roslyn  Final   2022 No growth after 10 days  Preliminary   2022 1+ Streptococcus anginosus (A)  Final     Comment:     This organism is susceptible to ampicillin, penicillin, vancomycin and the cephalosporins. If treatment is required and your patient is allergic to penicillin, contact the microbiology lab within 5 days to request susceptibility testing.   2022 1+ Staphylococcus epidermidis (A)  Corrected              Imagin/6/22 CT read:  IMPRESSION:  1.  CT of the head demonstrates no acute cranial abnormality. No  abnormal intracranial contrast enhancing lesions.   2.  CT of the neck shows postsurgical changes of anterior median  sternotomy and  bilateral incision and drainage of cervical  parapharyngeal spaces for necrotizing infection.  Large open wound  extends over the anterior cervical soft tissues with diffuse  heterogeneous edema of multiple neck spaces as described in great  detail above sparing the danger space. No definite residual  rim-enhancing collection however, the edematous collections could  represent developing phlegmon.   3.  No definite flow seen in the left mid and inferior internal  jugular vein, compatible with thrombus. 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  4.  Diffuse dental caries, left maxillary periapical dental disease  and recent left mandibular dental extractions.    5/9/22 CT read:  Impression:  1.  Postsurgical changes of neck wound washout with large anterior  open neck wound; new left sublingual space washout with drain place.   2.  Interval changes of consolidating phlegmons and evolving abscesses  throughout the neck involving the sublingual, ,  submandibular, and bilateral parapharyngeal spaces, and  retrosternal/retromanubrial region.  3.  Increased conspicuity of rim enhancing fluid collection along the  left upper thoracic esophagus, possibly representing paraesophageal  versus mucosal abscess. Cannot definitely exclude upper esophageal  injury.  4.  Upper lung patchy dependent consolidative and groundglass airspace  opacities; atelectasis versus infection.  5.  Loculated bilateral pleural effusions.

## 2022-05-15 NOTE — PLAN OF CARE
1900-0730  Status: Here for necrotizing soft tissue infection. Multiple washouts and tooth extractions. POD #1 I&D.   VS: VSS on RA ex intermittent fevers and tachycardia.   Neuros: Intact. Decreased sensation on L cheek area.  GI: NPO ex ice chips. Advance diet per speech but patient is hesitant d/t infection results. Patient did have some sips of nectar thick apple juice and tolerated. Goal of 7 cans a day. Received 4.5/7 cans yesterday. Patient only wanted 1.5 cans over this shift. BS+, small BM this shift. Patient refused bowel meds in evening, but stated overnight they felt like they were straining to go to bathroom, so they want to have Bowel meds in morning with possibly an additional bowel med on top.  : Voiding spontaneously.   Respiratory: WNL. Good, strong cough. Copious oral secretions, self suctions.   Skin: Neck incision L and medial covered with dressing, packing done per ENT. L and R chest tube sites covered with dressings and  Sternal wound sites covered with dressings; redressed this shift. Bruising throughout along with scabbing. Diffuse blanchable erythema noted near neck incision, marked. PEG site WNL.    IV: PIV x2, one SL between antibiotics and other SL.   Activity: Up independently in room.   Pain: Patient requested PRN oxycodone overnight but patient felt loopy after. Patient requesting no more oxycodone and some other pain medication to have. PRN ketamine available only prior to dressing changes.  Social: Mother, Eloina, Updated overnight.  Plan: Continue to monitor and follow POC. Pain management. Bowel meds. TF.

## 2022-05-15 NOTE — PROVIDER NOTIFICATION
Provider notified of patients neck packing beginning to fall out more. Abdominal binder dressing reinforced.

## 2022-05-16 ENCOUNTER — APPOINTMENT (OUTPATIENT)
Dept: CT IMAGING | Facility: CLINIC | Age: 27
End: 2022-05-16
Payer: COMMERCIAL

## 2022-05-16 ENCOUNTER — APPOINTMENT (OUTPATIENT)
Dept: SPEECH THERAPY | Facility: CLINIC | Age: 27
End: 2022-05-16
Payer: COMMERCIAL

## 2022-05-16 LAB
ANION GAP SERPL CALCULATED.3IONS-SCNC: 9 MMOL/L (ref 3–14)
BUN SERPL-MCNC: 14 MG/DL (ref 7–30)
CALCIUM SERPL-MCNC: 9 MG/DL (ref 8.5–10.1)
CHLORIDE BLD-SCNC: 104 MMOL/L (ref 94–109)
CO2 SERPL-SCNC: 24 MMOL/L (ref 20–32)
CREAT SERPL-MCNC: 0.77 MG/DL (ref 0.66–1.25)
CRP SERPL-MCNC: 77 MG/L (ref 0–8)
ERYTHROCYTE [DISTWIDTH] IN BLOOD BY AUTOMATED COUNT: 13.7 % (ref 10–15)
ERYTHROCYTE [SEDIMENTATION RATE] IN BLOOD BY WESTERGREN METHOD: 96 MM/HR (ref 0–15)
GFR SERPL CREATININE-BSD FRML MDRD: >90 ML/MIN/1.73M2
GLUCOSE BLD-MCNC: 94 MG/DL (ref 70–99)
GLUCOSE BLDC GLUCOMTR-MCNC: 104 MG/DL (ref 70–99)
GLUCOSE BLDC GLUCOMTR-MCNC: 111 MG/DL (ref 70–99)
GLUCOSE BLDC GLUCOMTR-MCNC: 116 MG/DL (ref 70–99)
GLUCOSE BLDC GLUCOMTR-MCNC: 159 MG/DL (ref 70–99)
GLUCOSE BLDC GLUCOMTR-MCNC: 93 MG/DL (ref 70–99)
GLUCOSE BLDC GLUCOMTR-MCNC: 97 MG/DL (ref 70–99)
HCT VFR BLD AUTO: 32.3 % (ref 40–53)
HGB BLD-MCNC: 10 G/DL (ref 13.3–17.7)
MAGNESIUM SERPL-MCNC: 2.2 MG/DL (ref 1.6–2.3)
MCH RBC QN AUTO: 29.6 PG (ref 26.5–33)
MCHC RBC AUTO-ENTMCNC: 31 G/DL (ref 31.5–36.5)
MCV RBC AUTO: 96 FL (ref 78–100)
PHOSPHATE SERPL-MCNC: 4 MG/DL (ref 2.5–4.5)
PLATELET # BLD AUTO: 402 10E3/UL (ref 150–450)
POTASSIUM BLD-SCNC: 3.8 MMOL/L (ref 3.4–5.3)
RBC # BLD AUTO: 3.38 10E6/UL (ref 4.4–5.9)
SODIUM SERPL-SCNC: 137 MMOL/L (ref 133–144)
WBC # BLD AUTO: 6.9 10E3/UL (ref 4–11)

## 2022-05-16 PROCEDURE — 250N000011 HC RX IP 250 OP 636: Performed by: PHYSICIAN ASSISTANT

## 2022-05-16 PROCEDURE — 250N000011 HC RX IP 250 OP 636: Performed by: STUDENT IN AN ORGANIZED HEALTH CARE EDUCATION/TRAINING PROGRAM

## 2022-05-16 PROCEDURE — 250N000013 HC RX MED GY IP 250 OP 250 PS 637: Performed by: STUDENT IN AN ORGANIZED HEALTH CARE EDUCATION/TRAINING PROGRAM

## 2022-05-16 PROCEDURE — 80048 BASIC METABOLIC PNL TOTAL CA: CPT | Performed by: STUDENT IN AN ORGANIZED HEALTH CARE EDUCATION/TRAINING PROGRAM

## 2022-05-16 PROCEDURE — 70486 CT MAXILLOFACIAL W/O DYE: CPT | Mod: 26 | Performed by: RADIOLOGY

## 2022-05-16 PROCEDURE — 83735 ASSAY OF MAGNESIUM: CPT | Performed by: STUDENT IN AN ORGANIZED HEALTH CARE EDUCATION/TRAINING PROGRAM

## 2022-05-16 PROCEDURE — 70486 CT MAXILLOFACIAL W/O DYE: CPT

## 2022-05-16 PROCEDURE — 85652 RBC SED RATE AUTOMATED: CPT | Performed by: STUDENT IN AN ORGANIZED HEALTH CARE EDUCATION/TRAINING PROGRAM

## 2022-05-16 PROCEDURE — 99232 SBSQ HOSP IP/OBS MODERATE 35: CPT | Performed by: INTERNAL MEDICINE

## 2022-05-16 PROCEDURE — 84100 ASSAY OF PHOSPHORUS: CPT | Performed by: STUDENT IN AN ORGANIZED HEALTH CARE EDUCATION/TRAINING PROGRAM

## 2022-05-16 PROCEDURE — 120N000002 HC R&B MED SURG/OB UMMC

## 2022-05-16 PROCEDURE — 85027 COMPLETE CBC AUTOMATED: CPT | Performed by: STUDENT IN AN ORGANIZED HEALTH CARE EDUCATION/TRAINING PROGRAM

## 2022-05-16 PROCEDURE — 86140 C-REACTIVE PROTEIN: CPT | Performed by: STUDENT IN AN ORGANIZED HEALTH CARE EDUCATION/TRAINING PROGRAM

## 2022-05-16 PROCEDURE — 87040 BLOOD CULTURE FOR BACTERIA: CPT | Performed by: INTERNAL MEDICINE

## 2022-05-16 PROCEDURE — 92526 ORAL FUNCTION THERAPY: CPT | Mod: GN

## 2022-05-16 PROCEDURE — 36415 COLL VENOUS BLD VENIPUNCTURE: CPT | Performed by: INTERNAL MEDICINE

## 2022-05-16 PROCEDURE — 36415 COLL VENOUS BLD VENIPUNCTURE: CPT | Performed by: STUDENT IN AN ORGANIZED HEALTH CARE EDUCATION/TRAINING PROGRAM

## 2022-05-16 PROCEDURE — 258N000003 HC RX IP 258 OP 636: Performed by: STUDENT IN AN ORGANIZED HEALTH CARE EDUCATION/TRAINING PROGRAM

## 2022-05-16 RX ADMIN — AMPICILLIN SODIUM AND SULBACTAM SODIUM 3 G: 2; 1 INJECTION, POWDER, FOR SOLUTION INTRAMUSCULAR; INTRAVENOUS at 04:27

## 2022-05-16 RX ADMIN — ACETAMINOPHEN 325MG 650 MG: 325 TABLET ORAL at 04:21

## 2022-05-16 RX ADMIN — ENOXAPARIN SODIUM 90 MG: 100 INJECTION SUBCUTANEOUS at 13:29

## 2022-05-16 RX ADMIN — AMPICILLIN SODIUM AND SULBACTAM SODIUM 3 G: 2; 1 INJECTION, POWDER, FOR SOLUTION INTRAMUSCULAR; INTRAVENOUS at 10:01

## 2022-05-16 RX ADMIN — HYOSCYAMINE SULFATE: 16 SOLUTION at 07:47

## 2022-05-16 RX ADMIN — AMPICILLIN SODIUM AND SULBACTAM SODIUM 3 G: 2; 1 INJECTION, POWDER, FOR SOLUTION INTRAMUSCULAR; INTRAVENOUS at 15:59

## 2022-05-16 RX ADMIN — Medication 1 PACKET: at 20:07

## 2022-05-16 RX ADMIN — ENOXAPARIN SODIUM 90 MG: 100 INJECTION SUBCUTANEOUS at 23:56

## 2022-05-16 RX ADMIN — SENNOSIDES AND DOCUSATE SODIUM 2 TABLET: 8.6; 5 TABLET ORAL at 20:06

## 2022-05-16 RX ADMIN — INSULIN ASPART 1 UNITS: 100 INJECTION, SOLUTION INTRAVENOUS; SUBCUTANEOUS at 16:00

## 2022-05-16 RX ADMIN — FLUCONAZOLE 400 MG: 2 INJECTION INTRAVENOUS at 13:31

## 2022-05-16 RX ADMIN — ACETAMINOPHEN 325MG 650 MG: 325 TABLET ORAL at 20:07

## 2022-05-16 RX ADMIN — AMPICILLIN SODIUM AND SULBACTAM SODIUM 3 G: 2; 1 INJECTION, POWDER, FOR SOLUTION INTRAMUSCULAR; INTRAVENOUS at 22:06

## 2022-05-16 RX ADMIN — ACETAMINOPHEN 325MG 650 MG: 325 TABLET ORAL at 13:32

## 2022-05-16 RX ADMIN — Medication 1 PACKET: at 13:33

## 2022-05-16 RX ADMIN — HYDROXYZINE HYDROCHLORIDE 25 MG: 10 SOLUTION ORAL at 20:29

## 2022-05-16 RX ADMIN — Medication 1 PACKET: at 16:02

## 2022-05-16 RX ADMIN — CYCLOBENZAPRINE HYDROCHLORIDE 5 MG: 5 TABLET, FILM COATED ORAL at 16:12

## 2022-05-16 RX ADMIN — ACETAMINOPHEN 325MG 650 MG: 325 TABLET ORAL at 08:04

## 2022-05-16 RX ADMIN — SODIUM CHLORIDE, POTASSIUM CHLORIDE, SODIUM LACTATE AND CALCIUM CHLORIDE: 600; 310; 30; 20 INJECTION, SOLUTION INTRAVENOUS at 13:40

## 2022-05-16 RX ADMIN — CYCLOBENZAPRINE HYDROCHLORIDE 5 MG: 5 TABLET, FILM COATED ORAL at 04:21

## 2022-05-16 RX ADMIN — SENNOSIDES AND DOCUSATE SODIUM 2 TABLET: 8.6; 5 TABLET ORAL at 08:04

## 2022-05-16 RX ADMIN — Medication 2 PACKET: at 20:07

## 2022-05-16 ASSESSMENT — ACTIVITIES OF DAILY LIVING (ADL)
ADLS_ACUITY_SCORE: 22
ADLS_ACUITY_SCORE: 22
ADLS_ACUITY_SCORE: 20
ADLS_ACUITY_SCORE: 22
ADLS_ACUITY_SCORE: 22
ADLS_ACUITY_SCORE: 20
ADLS_ACUITY_SCORE: 22
ADLS_ACUITY_SCORE: 22
ADLS_ACUITY_SCORE: 20
ADLS_ACUITY_SCORE: 20
ADLS_ACUITY_SCORE: 22
ADLS_ACUITY_SCORE: 22

## 2022-05-16 NOTE — PLAN OF CARE
Status: Here for necrotizing soft tissue infection. 5-4 Thoracotomy, GTube and I and  D of pharyngeal abscess. 5-13 exploration of neck .   VS: T max 100.1, but has been getting tylenol Q 4-5 hours.   GI: Was NPO this AM, but MD's called and said OK to start TF's but keep NPO so has had 2 cans of food this afternoon.BM 5/15.  : Voiding spontaneously.   Respiratory: WNL. Good, strong cough. Oral secretions, self suctions.   Skin: Neck packing done by MD's this AM, outside dressing changed x 2. Chest dressing changed as per order and R CT dressing changed as well. L chest tube site HRIAM. PEG dressing changed this AM, Site WNL.    IV: PIV infusing LR @ 100 mL/hr.   Activity: Up independently in room.   Pain: Denies pain, but he still wanted tylenol Q 4 hours.  Social: Has been on phone and starting to participate in TF cares.  Plan: Continue with cares as ordered.

## 2022-05-16 NOTE — PROGRESS NOTES
General ID [facility] Service: Follow-up Note      Patient:  Robert Gallo, Date of birth 1995, Medical record number [id number]  Date of Visit:  May 9, 2022  Reason for consult: necrotizing neck infection         Assessment and Recommendations:     ID Problem list:  Necrotizing soft tissue infection of neck and mediastinum  S/p neck and mediastinum debridement 5/4/22 and 5/13 by ENT and thoracic surgery  Polymicrobial infection with prelim Strep anginosus, Staph epidermidis, Prevotella, Carlyn dubliniensis  Fevers  Dental caries, s/p tooth extractions 5/4  Methamphetamine use, inhaled    Recs:  Continue Unasyn 3 grams IV Q 6 hours  Continue fluconazole  3.   Continue trending WBC count and CRP  4.   Consider follow up imaging if fever not resolving    Discussion:  Robert is a 26M with PMH including dental caries who was admitted 5/4 due to necrotizing soft tissue infection of the neck and mediastinum, now s/p debridement by ENT and thoracic surgery 5/4 and s/p VATS/chest tubes and PEG and also extraction of multiple teeth. His OR cultures have grown polymicrobial bacteria thus far including Strep anginosus, Staph epidermidis, and Prevotella, and candida. Currently on Unasyn and fluconazole. Repeat I&D on 5/13 and improving fever curve afterwards. Continuing to monitor for clinical change, may need repeat imaging in the coming days if still febrile.    Thank you for allowing us to participate in the care of this patient. ID will continue to follow.    Yg Douglas MD  Infectious Disease Fellow    ID Staff Assessment and Plan:   Patient was seen and examined by me with the ID Fellow. The note above reflects our joint assessment and recommendations. I have reviewed the medical record, labs, imaging, vitals signs and have discussed the patient with the ID fellow in detail.     In summary, I am concerned by the continued fevers that there may still be some residual un-drained focus of infection in the  patient's mediastinum or neck. If continued fevers persist, I would recommend obtaining repeat CT of the neck and chest to look for additional abscess pockets that need to be drained.    Carla Arevalo MD  ID staff physician  Pager [phone]           Interval History:     Weekend events reviewed. No acute issues. RTOR on last Friday. Fever curve improving. Swallowing improved.         Review of Systems:     8 point ROS negative unless noted above         Current Antimicrobials     Unasyn  Fluconazole         Physical Exam:   Ranges for vital signs:  Temp:  [97.2  F (36.2  C)-100.1  F (37.8  C)] 98.5  F (36.9  C)  Pulse:  [78-88] 78  Resp:  [16] 16  BP: (119-138)/(57-68) 138/66  SpO2:  [95 %-99 %] 95 %    Exam:  GENERAL:  well-developed, well-nourished, sitting in up at bedside  ENT/NECK:  Head is normocephalic, atraumatic. Extubated and communicative. +surgical wounds with new dressing   LUNGS:  Breathing comfortably on room air. Basilar crackles  CARDIOVASCULAR:  Regular rate and rhythm. No murmurs.   ABDOMEN:  Non-distended, soft, nontender.  EXT: Extremities warm and well perfused.  SKIN:  No acute rashes on extremities.   NEUROLOGIC:  Awake and orientated  LINES: PIVs without erythema or drainage         Laboratory Data:       Inflammatory Markers    Recent Labs   Lab Test 05/16/22  0906 05/15/22  0808 05/14/22  0703 05/13/22  0849 05/12/22  0418 05/11/22  0345 05/10/22  0557 05/09/22  0730   SED 96* 82* 74* 81* 91* 98* 96* 96*   CRP 77.0* 120.0* 130.0* 100.0* 120.0* 150.0* 150.0* 160.0*       Hematology Studies    Recent Labs   Lab Test 05/16/22  0906 05/15/22  0808 05/14/22  0703 05/13/22  0849 05/12/22  0418 05/11/22  0345   WBC 6.9 9.7 16.4* 11.5* 11.3* 9.2   HGB 10.0* 10.5* 10.9* 11.1* 10.9* 10.2*   MCV 96 96 93 95 95 97    447 415 410 344 328       Metabolic Studies     Recent Labs   Lab Test 05/16/22  0906 05/15/22  0808 05/14/22  0703 05/13/22  0849 05/12/22  0418    135  135 137 137 138    POTASSIUM 3.8 4.0  4.0 4.0 3.9 3.8   CHLORIDE 104 104  104 106 106 107   CO2 24 22  22 23 26 25   BUN 14 14  14 16 17 16   CR 0.77 0.68  0.76 0.84 0.96 0.74   GFRESTIMATED >90 >90  >90 >90 >90 >90       Hepatic Studies    Recent Labs   Lab Test 05/15/22  0808 05/09/22  0730 05/08/22  0456 05/04/22  0557   BILITOTAL 0.5 0.6 0.6 1.5*   ALKPHOS 176* 164* 185* 93   ALBUMIN 2.4* 1.9* 1.8* 2.0*   AST 59* 75* 120* 32   * 110* 126* 26       Microbiology:  Culture   Date Value Ref Range Status   05/15/2022 No growth after 1 day  Preliminary   05/15/2022 No growth after 1 day  Preliminary   05/09/2022 No Growth  Final   05/09/2022 No Growth  Final   05/08/2022 No Growth  Final   05/08/2022 No Growth  Final   05/06/2022 2+ Mixed Aerobic and Anaerobic roslyn  Final   05/06/2022 1+ Normal roslyn  Final   05/06/2022 4+ Carlyn dubliniensis (A)  Final     Comment:     Susceptibilities not routinely done   05/06/2022 3+ Carlyn dubliniensis (A)  Final   05/06/2022 1+ Carlyn dubliniensis (A)  Final     Comment:     Susceptibilities not routinely done   05/04/2022 No Growth  Final   05/04/2022 No Growth  Final   05/04/2022 1+ Prevotella species (A)  Final     Comment:     Susceptibilities not routinely done   05/04/2022 2+ Mixed Aerobic and Anaerobic roslyn  Final   05/04/2022 Carlyn dubliniensis (A)  Preliminary   05/04/2022 1+ Streptococcus anginosus (A)  Final     Comment:     This organism is susceptible to ampicillin, penicillin, vancomycin and the cephalosporins. If treatment is required and your patient is allergic to penicillin, contact the microbiology lab within 5 days to request susceptibility testing.   05/04/2022 1+ Normal roslyn  Final   05/04/2022 2+ Prevotella species (A)  Final     Comment:     Susceptibilities not routinely done   05/04/2022 2+ Prevotella species (A)  Final     Comment:     Susceptibilities not routinely done   05/04/2022 4+ Mixed Aerobic and Anaerobic roslyn  Final   05/04/2022 No  growth after 12 days  Preliminary   05/04/2022 1+ Streptococcus anginosus (A)  Final     Comment:     This organism is susceptible to ampicillin, penicillin, vancomycin and the cephalosporins. If treatment is required and your patient is allergic to penicillin, contact the microbiology lab within 5 days to request susceptibility testing.   05/04/2022 1+ Staphylococcus epidermidis (A)  Corrected

## 2022-05-16 NOTE — CONSULTS
Dental Service Consultation    Robert Gallo MRN# [id number]  YOB: 1995 Age: 26 year old  Date of Admission: 5/4/2022    Reason for consult: I was asked by ENT/OMS to evaluate this patient for dental caries and possible need for extractions/comprehensive care while as an inpatient.    Chief Complaint: Pt reports hx of dental pain in past, which has since subsided. Pt stated he has not had dental pain since being in the hospital.     Assessment and Plan:  Assessment:   Radiographic exam: Dental CT reveals partially edentulous adult dentition. Condyles seated in fossa bilaterally, maxillary sinuses clear bilaterally. #s 1 and 16 are complete bony impacted. Coronal radiolucencies consistent with decay on teeth #s 2, 3, 5, 6, 7, 8, 9, 10, 11, 12, 14, 15, 22, 27, 29, and 30. Periapical radiolucencies consistent with periapical abscesses on teeth #s 5, 6, 7, 8, 11, 12, 29. Retained roots/loss of significant coronal tooth structure on #s 2, 6, 7, 8, 15, 29, 30. No osseous pathology seen.Pt is missing #s 4, 13, 17, 18, 19, 20, 31, and 32.      Extraoral exam: NC/AT, EOMI, PERRL, No submandibular lymphadenopathy, no induration or erythema, no abnormal skin lesions other than previous neck surgeries, inferior border of mandible is palpable bilaterally, FLOYD >45mm. Left TMJ discomfort noted due to previous procedures and hx of necrotizing fasciitis to neck and mediastinum. FOM soft and non tender. No clicking of TMJ on opening and lateral movements.     Intraoral exam: Reveals decayed and poor dentition. Patient asymptomatic to palpation and percussion. Mallampati I. FLOYD ~40mm. Multiple teeth with retained roots and heavily carious crowns/rampant decay.       Plan:  - Extraction of at least tooth #s 1, 2, 3, 5, 6, 7, 8, 9, 10, 11, 12, 14, 15, 16, 29, 30  due to caries and periapical abscesses. Additional extractions upon further imaging and examination at the clinic if deemed necessary.   - Patient will be seen  at the Oral Surgery Clinic as [place] for [place] of teeth ([facility] [place] been notified of the consult). Patient accepts to be seen at the [facility] for their tooth extractions.  - Patient will be seen at the [facility] once the patient is stable enough to transfer. If the patient is not stable enough to transfer coordination with our scheduling team may be necessary to see patient in the operating room for tooth extractions.   - Pt's nurse, Yi, has been given an update on the consult. Dr. Marcelo Blackburn has been paged about the consult as well.     Clinic information:   [facility]   [facility] [address]   [address] [place] [id number]    History is obtained from the patient.      History of Present Illness:  Robert is a 26M with PMH including dental caries who was admitted 5/4 due to necrotizing soft tissue infection of the neck and mediastinum involvement s/p I&D of bilateral parapharyngeal spaces, sublingual space, bilateral necks, and extraction of teeth #s 19 and 20 by ENT. Now s/p debridement by ENT and thoracic surgery 5/4 and s/p VATS/chest tubes and PEG and also extraction of multiple teeth. His OR cultures have grown polymicrobial bacteria thus far including Strep anginosus, Staph epidermidis, and Prevotella, and candida. Currently on Unasyn and fluconazole. Repeat I&D on 5/13 and improving fever curve afterwards. Continuing to monitor for clinical change, may need repeat imaging in the coming days if still febrile.    Past Medical History:  Past Medical History:   Diagnosis Date     No known problems        Past Surgical History:  Past Surgical History:   Procedure Laterality Date     EXPLORE NECK N/A 5/13/2022    Procedure: Oral Exploration, Neck Exploration;  Surgeon: Syd Vila MD;  Location: [facility] OR     IRRIGATION AND DEBRIDEMENT NECK, COMBINED N/A 5/4/2022    Procedure: INCISION AND DRAINAGE  PARAPHARYNGEAL ABCESS ON RIGHT AND LEFT;  Surgeon: Milena Gore MD;  Location: [facility] OR     PICC DOUBLE LUMEN PLACEMENT Left 05/04/2022    Left basilic, 47 cm, 1 external length     THORACOSCOPY N/A 5/4/2022    Procedure: BILATERAL VIDEO ASSISTED THORACOTOMY. RIGHT ANTERIOR MEDIASTINOTYOMY. PEG TUBE.;  Surgeon: Abundio Patel MD;  Location: [facility] OR     THORACOSCOPY  5/4/2022    Procedure: ;  Surgeon: Abundio Patel MD;  Location: [facility] OR       Social History:  Social History     Tobacco Use     Smoking status: Not on file     Smokeless tobacco: Not on file   Substance Use Topics     Alcohol use: Not on file       Family History:  Family History   Problem Relation Age of Onset     No Known Problems Mother      No Known Problems Father        Immunizations:    There is no immunization history on file for this patient.    Allergies:  No Known Allergies    Medications:  Current Facility-Administered Medications Ordered in Epic   Medication Dose Route Frequency Last Rate Last Admin     acetaminophen (TYLENOL) tablet 650 mg  650 mg Oral Q4H PRN   650 mg at 05/16/22 1332    Or     acetaminophen (TYLENOL) solution 650 mg  650 mg Per NG tube Q4H PRN         ampicillin-sulbactam (UNASYN) 3 g vial to attach to  mL bag  3 g Intravenous Q6H   3 g at 05/16/22 1559     bisacodyl (DULCOLAX) Suppository 10 mg  10 mg Rectal Daily PRN         cyclobenzaprine (FLEXERIL) tablet 5 mg  5 mg Oral Q8H PRN   5 mg at 05/16/22 1612     dextrose 10% infusion   Intravenous Continuous PRN         glucose gel 15-30 g  15-30 g Oral Q15 Min PRN        Or     dextrose 50 % injection 25-50 mL  25-50 mL Intravenous Q15 Min PRN        Or     glucagon injection 1 mg  1 mg Subcutaneous Q15 Min PRN         enoxaparin ANTICOAGULANT (LOVENOX) injection 90 mg  1 mg/kg (Dosing Weight) Subcutaneous Q12H   90 mg at 05/16/22 1329     fiber modular (NUTRISOURCE FIBER) packet 1 packet  1 packet Per Feeding Tube 4x Daily   1 packet at  05/16/22 1602     fluconazole (DIFLUCAN) intermittent infusion 400 mg in NaCl  400 mg Intravenous Q24H 100 mL/hr at 05/16/22 1331 400 mg at 05/16/22 1331     heparin lock flush 10 UNIT/ML injection 5-20 mL  5-20 mL Intracatheter Q1H PRN         HYDROmorphone (PF) (DILAUDID) injection 0.5-1 mg  0.5-1 mg Intravenous Q2H PRN   0.5 mg at 05/14/22 0859     hydrOXYzine (ATARAX) syrup 25 mg  25 mg Oral Q8H PRN   25 mg at 05/15/22 2308     insulin aspart (NovoLOG) injection (RAPID ACTING)  1-6 Units Subcutaneous Q4H   1 Units at 05/16/22 1600     ketamine (KETALAR) injection 30 mg  30 mg Intravenous BID PRN   30 mg at 05/09/22 1411     lactated ringers infusion   Intravenous Continuous 100 mL/hr at 05/16/22 1340 New Bag at 05/16/22 1340     lidocaine (LMX4) cream   Topical Q1H PRN         lidocaine 1 % 0.1-1 mL  0.1-1 mL Other Q1H PRN         melatonin tablet 6 mg  6 mg Oral or Feeding Tube At Bedtime PRN   6 mg at 05/15/22 1933     naloxone (NARCAN) injection 0.2 mg  0.2 mg Intravenous Q2 Min PRN        Or     naloxone (NARCAN) injection 0.4 mg  0.4 mg Intravenous Q2 Min PRN        Or     naloxone (NARCAN) injection 0.2 mg  0.2 mg Intramuscular Q2 Min PRN        Or     naloxone (NARCAN) injection 0.4 mg  0.4 mg Intramuscular Q2 Min PRN         ondansetron (ZOFRAN-ODT) ODT tab 4 mg  4 mg Oral Q6H PRN   4 mg at 05/10/22 0808    Or     ondansetron (ZOFRAN) injection 4 mg  4 mg Intravenous Q6H PRN   4 mg at 05/15/22 1223     oxyCODONE (ROXICODONE) tablet 5 mg  5 mg Per Feeding Tube Q6H PRN   5 mg at 05/15/22 0040     polyethylene glycol (MIRALAX) Packet 17 g  17 g Oral or Feeding Tube Daily PRN         prochlorperazine (COMPAZINE) injection 10 mg  10 mg Intravenous Q6H PRN        Or     prochlorperazine (COMPAZINE) tablet 10 mg  10 mg Oral Q6H PRN        Or     prochlorperazine (COMPAZINE) suppository 25 mg  25 mg Rectal Q12H PRN         protein modular (PROSOURCE TF) 2 packet  2 packet Per Feeding Tube BID   2 packet at  05/15/22 1934     senna-docusate (SENOKOT-S/PERICOLACE) 8.6-50 MG per tablet 2 tablet  2 tablet Oral or Feeding Tube BID   2 tablet at 05/16/22 0804     sodium chloride (PF) 0.9% PF flush 10-20 mL  10-20 mL Intracatheter q1 min prn         sodium chloride (PF) 0.9% PF flush 10-20 mL  10-20 mL Intracatheter q1 min prn         sodium chloride (PF) 0.9% PF flush 3 mL  3 mL Intracatheter Q8H   3 mL at 05/15/22 0810     sodium chloride (PF) 0.9% PF flush 3 mL  3 mL Intracatheter q1 min prn         sodium hypochlorite (DAKINS half-strength) external solution   Irrigation BID   Given by Other at 05/16/22 0747     No current [facility]-ordered outpatient medications on file.       Review of Systems:  The 10 point Review of Systems is negative other than noted in the HPI    Physical Exam:  Vitals were reviewed  Temp: 98.5  F (36.9  C) Temp src: Oral BP: 138/66 Pulse: 78   Resp: 16 SpO2: 95 % O2 Device: None (Room air)        Head and neck exam:  NC/AT, EOMI, PERRL, No submandibular lymphadenopathy, no induration or erythema, no abnormal skin lesions other than previous neck surgeries, inferior border of mandible is palpable bilaterally, FLOYD >45mm. Left TMJ discomfort noted due to previous procedures and hx of necrotizing fasciitis to neck and mediastinum. FOM soft and non tender. No clicking of TMJ on opening and lateral movements.    Intraoral exam: Reveals decayed and poor dentition. Patient asymptomatic to palpation and percussion. Mallampati I. FLOYD ~40mm. Multiple teeth with retained roots and heavily carious crowns/rampant decay.      Data:  Radiographic interpretation: Dental CT taken on 5/16/22. Dental CT reveals partially edentulous adult dentition. Condyles seated in fossa bilaterally, maxillary sinuses clear bilaterally. #s 1 and 16 are complete bony impacted. Coronal radiolucencies consistent with decay on teeth #s 2, 3, 5, 6, 7, 8, 9, 10, 11, 12, 14, 15, 22, 27, 29, and 30. Periapical radiolucencies consistent with  periapical abscesses on teeth #s 5, 6, 7, 8, 11, 12, 29. Retained roots/loss of significant coronal tooth structure on #s 2, 6, 7, 8, 15, 29, 30. No osseous pathology seen. Pt is missing #s 4, 13, 17, 18, 19, 20, 31, and 32.   Pulpal Pathology: Pulpal necrosis most-likely on teeth #s 1, 2, 3, 5, 6, 7, 8, 9, 10, 11, 12, 14, 15, 16, 29, 30    Periodontal Pathology: Periapical abscess tooth #s 5, 6, 7, 8, 11, 12, 29.        The patient was discussed with: Dr. Amaya.     Ashley Jarvis DMD   [facility] Dental PGY1  Pager: [phone]

## 2022-05-16 NOTE — PLAN OF CARE
1900-0730  Status: Here for necrotizing soft tissue infection. Multiple washouts and tooth extractions. POD #2 I&D.   VS: VSS on RA ex intermittent fevers and tachycardia.   Neuros: Intact. Decreased sensation on L cheek area. Patient mood withdrawn and feeling down/hopeless.   GI: NPO since 0000 for possible washout today. On bolus TF, Goal of 7 cans a day. Received 6/7 cans yesterday. Patient did not want last can. BS+, no BM this shift. LBM 5/15.  : Voiding spontaneously.   Respiratory: WNL. Good, strong cough. Copious oral secretions, self suctions.   Skin: Neck incision L and medial covered with dressing, packing done per ENT. Neck dressing with small-moderate serosanguinous drainage. L and R chest tube sites covered with dressings and  Sternal wound sites covered with dressings; redressed BID. Bruising throughout along with scabbing. Diffuse blanchable erythema noted near neck incision, marked. PEG site WNL.    IV: PIV infusing LR @ 100 mL/hr.   Activity: Up independently in room.   Pain: PRN flexeril and tylenol for neck pain.  Social: Patient sleeping in between cares.  Plan: Continue to monitor and follow POC.

## 2022-05-16 NOTE — PROGRESS NOTES
"Otolaryngology Progress Note  05/16/22     SUBJECTIVE: No acute events overnight. Tmax 101.1 at 1400 on 5/15, no fevers overnight, fever curve is down trending. Overall feels he is improving.    OBJECTIVE:   /68 (BP Location: Left arm)   Pulse 78   Temp 100.1  F (37.8  C) (Oral)   Resp 16   Ht 1.9 m (6' 2.8\")   Wt 98.4 kg (216 lb 14.9 oz)   SpO2 98%   BMI 27.26 kg/m     General: Alert and oriented x 3, No acute distress   HEENT: Anterior neck wound with packing removed, no purulence in wound. Dakins soaked kerlix re-applied. Surrounding skin edges appear healthy. Intraoral wound on right probed deeply with no purulent return. Left oral wound probed with end of q-tip and no purulence returned. Chest wound with bandage managed by Thoracic.   Pulmonary: Breathing non-labored, no stridor, no accessory muscle use.    LABS:  ROUTINE IP LABS (Last four results)  BMP  Recent Labs   Lab 05/16/22  0814 05/16/22  0420 05/16/22  0220 05/15/22  2300 05/15/22  1153 05/15/22  0808 05/14/22  0809 05/14/22  0703 05/13/22  1053 05/13/22  0849 05/12/22  0826 05/12/22  0418   NA  --   --   --   --   --  135  135  --  137  --  137  --  138   POTASSIUM  --   --   --   --   --  4.0  4.0  --  4.0  --  3.9  --  3.8   CHLORIDE  --   --   --   --   --  104  104  --  106  --  106  --  107   OLYA  --   --   --   --   --  8.7  8.7  --  8.7  --  8.8  --  8.3*   CO2  --   --   --   --   --  22  22  --  23  --  26  --  25   BUN  --   --   --   --   --  14  14  --  16  --  17  --  16   CR  --   --   --   --   --  0.68  0.76  --  0.84  --  0.96  --  0.74   * 111* 116* 99   < > 112*  112*   < > 127*   < > 104*   < > 130*    < > = values in this interval not displayed.     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    Neuro:  - Pain control: tylenol, oxycodone prn, dilaudid prn for dressing changes.  - Discussed addiction medicine consultation and patient is not currently interested. Will refrain from placing order at this time.    HEENT:  - ENT to perform BID packing changes to anterior neck and probing of intraoral wounds. Please keep dakin's solution and kerlix at bedside  - Thoracic surgery to manage inferior chest wound and PEG tube, please contact team with questions  - No plan for operative management today    Respiratory:  - supplemental O2 PRN to keep sats >92%    CV/heme:  - hemodynamically stable    FEN/GI:  - G-Tube, NPO was initiated at midnight for possible RTOR today, but given subjective improvement plan to re-initiate tube feedings  - Cleared for FLD by SLP however given his clinical status, holding off on PO at this time. Nutrition per G-tube.  - Tolerating bolus TF, appreciate recommendations per nutrition  - PRN antiemetics   - Bowel regimen: senna BID    :  - voiding independently    Endo  - No acute concerns  - Sliding scale    ID:  - Necrotizing infection- labs pending. Will continue to closely clinically monitor. CMP added to evaluate hepatic function. If worsening fevers or clinical status will consider CT Neck/Chest/Abdomen to rule out abdominal source as well, will continue to observe for now.   - Unasyn and fluconazole  - ID consulted,  appreciate recommendations    PPX:  - Lovenox  - SCDs  - IS    Dispo: Pending resolution of fevers    -- Patient and above plan discussed with Dr Gore.     Marcelo Blackburn MD  Otolaryngology-Head & Neck Surgery PGY-1  Please contact ENT by dialing * [phone] and entering job code [phone].

## 2022-05-17 ENCOUNTER — APPOINTMENT (OUTPATIENT)
Dept: SPEECH THERAPY | Facility: CLINIC | Age: 27
End: 2022-05-17
Payer: COMMERCIAL

## 2022-05-17 LAB
ALBUMIN SERPL-MCNC: 2.5 G/DL (ref 3.4–5)
ALP SERPL-CCNC: 157 U/L (ref 40–150)
ALT SERPL W P-5'-P-CCNC: 110 U/L (ref 0–70)
ANION GAP SERPL CALCULATED.3IONS-SCNC: 8 MMOL/L (ref 3–14)
APTT PPP: 33 SECONDS (ref 22–38)
AST SERPL W P-5'-P-CCNC: 44 U/L (ref 0–45)
BASOPHILS # BLD MANUAL: 0 10E3/UL (ref 0–0.2)
BASOPHILS NFR BLD MANUAL: 0 %
BILIRUB SERPL-MCNC: 0.4 MG/DL (ref 0.2–1.3)
BUN SERPL-MCNC: 14 MG/DL (ref 7–30)
CALCIUM SERPL-MCNC: 8.9 MG/DL (ref 8.5–10.1)
CHLORIDE BLD-SCNC: 104 MMOL/L (ref 94–109)
CO2 SERPL-SCNC: 24 MMOL/L (ref 20–32)
CREAT SERPL-MCNC: 0.78 MG/DL (ref 0.66–1.25)
CRP SERPL-MCNC: 67 MG/L (ref 0–8)
EOSINOPHIL # BLD MANUAL: 0.1 10E3/UL (ref 0–0.7)
EOSINOPHIL NFR BLD MANUAL: 1 %
ERYTHROCYTE [DISTWIDTH] IN BLOOD BY AUTOMATED COUNT: 13.4 % (ref 10–15)
ERYTHROCYTE [DISTWIDTH] IN BLOOD BY AUTOMATED COUNT: 13.8 % (ref 10–15)
ERYTHROCYTE [SEDIMENTATION RATE] IN BLOOD BY WESTERGREN METHOD: 114 MM/HR (ref 0–15)
GFR SERPL CREATININE-BSD FRML MDRD: >90 ML/MIN/1.73M2
GLUCOSE BLD-MCNC: 99 MG/DL (ref 70–99)
GLUCOSE BLDC GLUCOMTR-MCNC: 103 MG/DL (ref 70–99)
GLUCOSE BLDC GLUCOMTR-MCNC: 107 MG/DL (ref 70–99)
GLUCOSE BLDC GLUCOMTR-MCNC: 109 MG/DL (ref 70–99)
GLUCOSE BLDC GLUCOMTR-MCNC: 114 MG/DL (ref 70–99)
GLUCOSE BLDC GLUCOMTR-MCNC: 215 MG/DL (ref 70–99)
GLUCOSE BLDC GLUCOMTR-MCNC: 99 MG/DL (ref 70–99)
HCT VFR BLD AUTO: 22 % (ref 40–53)
HCT VFR BLD AUTO: 33.1 % (ref 40–53)
HGB BLD-MCNC: 10.5 G/DL (ref 13.3–17.7)
HGB BLD-MCNC: 6.9 G/DL (ref 13.3–17.7)
HOLD SPECIMEN: NORMAL
INR PPP: 1.13 (ref 0.85–1.15)
LYMPHOCYTES # BLD MANUAL: 0.9 10E3/UL (ref 0.8–5.3)
LYMPHOCYTES NFR BLD MANUAL: 12 %
MAGNESIUM SERPL-MCNC: 2.3 MG/DL (ref 1.6–2.3)
MCH RBC QN AUTO: 29.7 PG (ref 26.5–33)
MCH RBC QN AUTO: 29.9 PG (ref 26.5–33)
MCHC RBC AUTO-ENTMCNC: 31.4 G/DL (ref 31.5–36.5)
MCHC RBC AUTO-ENTMCNC: 31.7 G/DL (ref 31.5–36.5)
MCV RBC AUTO: 94 FL (ref 78–100)
MCV RBC AUTO: 95 FL (ref 78–100)
MONOCYTES # BLD MANUAL: 0.5 10E3/UL (ref 0–1.3)
MONOCYTES NFR BLD MANUAL: 7 %
NEUTROPHILS # BLD MANUAL: 5.9 10E3/UL (ref 1.6–8.3)
NEUTROPHILS NFR BLD MANUAL: 79 %
PHOSPHATE SERPL-MCNC: 4.2 MG/DL (ref 2.5–4.5)
PLASMA CELLS # BLD MANUAL: 0.1 10E3/UL
PLASMA CELLS NFR BLD MANUAL: 1 %
PLAT MORPH BLD: ABNORMAL
PLAT MORPH BLD: NORMAL
PLATELET # BLD AUTO: 516 10E3/UL (ref 150–450)
PLATELET # BLD AUTO: 600 10E3/UL (ref 150–450)
POTASSIUM BLD-SCNC: 3.8 MMOL/L (ref 3.4–5.3)
PROT SERPL-MCNC: 8.7 G/DL (ref 6.8–8.8)
RBC # BLD AUTO: 2.31 10E6/UL (ref 4.4–5.9)
RBC # BLD AUTO: 3.54 10E6/UL (ref 4.4–5.9)
RBC MORPH BLD: ABNORMAL
RBC MORPH BLD: NORMAL
SODIUM SERPL-SCNC: 136 MMOL/L (ref 133–144)
WBC # BLD AUTO: 7.5 10E3/UL (ref 4–11)
WBC # BLD AUTO: 8.1 10E3/UL (ref 4–11)

## 2022-05-17 PROCEDURE — 92526 ORAL FUNCTION THERAPY: CPT | Mod: GN

## 2022-05-17 PROCEDURE — 250N000013 HC RX MED GY IP 250 OP 250 PS 637: Performed by: STUDENT IN AN ORGANIZED HEALTH CARE EDUCATION/TRAINING PROGRAM

## 2022-05-17 PROCEDURE — 80053 COMPREHEN METABOLIC PANEL: CPT | Performed by: INTERNAL MEDICINE

## 2022-05-17 PROCEDURE — 85610 PROTHROMBIN TIME: CPT | Performed by: INTERNAL MEDICINE

## 2022-05-17 PROCEDURE — 85730 THROMBOPLASTIN TIME PARTIAL: CPT | Performed by: INTERNAL MEDICINE

## 2022-05-17 PROCEDURE — 36415 COLL VENOUS BLD VENIPUNCTURE: CPT | Performed by: PHYSICIAN ASSISTANT

## 2022-05-17 PROCEDURE — 83735 ASSAY OF MAGNESIUM: CPT | Performed by: STUDENT IN AN ORGANIZED HEALTH CARE EDUCATION/TRAINING PROGRAM

## 2022-05-17 PROCEDURE — 99232 SBSQ HOSP IP/OBS MODERATE 35: CPT | Performed by: ORTHOPAEDIC SURGERY

## 2022-05-17 PROCEDURE — 86140 C-REACTIVE PROTEIN: CPT | Performed by: STUDENT IN AN ORGANIZED HEALTH CARE EDUCATION/TRAINING PROGRAM

## 2022-05-17 PROCEDURE — 85018 HEMOGLOBIN: CPT | Performed by: STUDENT IN AN ORGANIZED HEALTH CARE EDUCATION/TRAINING PROGRAM

## 2022-05-17 PROCEDURE — 250N000011 HC RX IP 250 OP 636: Performed by: STUDENT IN AN ORGANIZED HEALTH CARE EDUCATION/TRAINING PROGRAM

## 2022-05-17 PROCEDURE — 120N000002 HC R&B MED SURG/OB UMMC

## 2022-05-17 PROCEDURE — 85652 RBC SED RATE AUTOMATED: CPT | Performed by: STUDENT IN AN ORGANIZED HEALTH CARE EDUCATION/TRAINING PROGRAM

## 2022-05-17 PROCEDURE — 87040 BLOOD CULTURE FOR BACTERIA: CPT | Performed by: INTERNAL MEDICINE

## 2022-05-17 PROCEDURE — 99207 PR APP CREDIT; MD BILLING SHARED VISIT: CPT | Performed by: PHYSICIAN ASSISTANT

## 2022-05-17 PROCEDURE — 36415 COLL VENOUS BLD VENIPUNCTURE: CPT | Performed by: STUDENT IN AN ORGANIZED HEALTH CARE EDUCATION/TRAINING PROGRAM

## 2022-05-17 PROCEDURE — 85007 BL SMEAR W/DIFF WBC COUNT: CPT | Performed by: PHYSICIAN ASSISTANT

## 2022-05-17 PROCEDURE — 84100 ASSAY OF PHOSPHORUS: CPT | Performed by: STUDENT IN AN ORGANIZED HEALTH CARE EDUCATION/TRAINING PROGRAM

## 2022-05-17 PROCEDURE — 250N000011 HC RX IP 250 OP 636: Performed by: PHYSICIAN ASSISTANT

## 2022-05-17 PROCEDURE — 85027 COMPLETE CBC AUTOMATED: CPT | Performed by: PHYSICIAN ASSISTANT

## 2022-05-17 PROCEDURE — 99233 SBSQ HOSP IP/OBS HIGH 50: CPT | Performed by: INTERNAL MEDICINE

## 2022-05-17 RX ORDER — LIDOCAINE HYDROCHLORIDE 10 MG/ML
5 INJECTION, SOLUTION EPIDURAL; INFILTRATION; INTRACAUDAL; PERINEURAL ONCE
Status: DISCONTINUED | OUTPATIENT
Start: 2022-05-17 | End: 2022-05-26 | Stop reason: HOSPADM

## 2022-05-17 RX ADMIN — Medication 1 PACKET: at 12:42

## 2022-05-17 RX ADMIN — HYOSCYAMINE SULFATE: 16 SOLUTION at 07:41

## 2022-05-17 RX ADMIN — Medication 2 PACKET: at 20:05

## 2022-05-17 RX ADMIN — ACETAMINOPHEN 325MG 650 MG: 325 TABLET ORAL at 07:41

## 2022-05-17 RX ADMIN — AMPICILLIN SODIUM AND SULBACTAM SODIUM 3 G: 2; 1 INJECTION, POWDER, FOR SOLUTION INTRAMUSCULAR; INTRAVENOUS at 16:07

## 2022-05-17 RX ADMIN — AMPICILLIN SODIUM AND SULBACTAM SODIUM 3 G: 2; 1 INJECTION, POWDER, FOR SOLUTION INTRAMUSCULAR; INTRAVENOUS at 04:09

## 2022-05-17 RX ADMIN — AMPICILLIN SODIUM AND SULBACTAM SODIUM 3 G: 2; 1 INJECTION, POWDER, FOR SOLUTION INTRAMUSCULAR; INTRAVENOUS at 10:17

## 2022-05-17 RX ADMIN — ENOXAPARIN SODIUM 90 MG: 100 INJECTION SUBCUTANEOUS at 12:42

## 2022-05-17 RX ADMIN — FLUCONAZOLE 400 MG: 2 INJECTION INTRAVENOUS at 12:42

## 2022-05-17 RX ADMIN — CYCLOBENZAPRINE HYDROCHLORIDE 5 MG: 5 TABLET, FILM COATED ORAL at 16:07

## 2022-05-17 RX ADMIN — Medication 2 PACKET: at 07:42

## 2022-05-17 RX ADMIN — HYDROXYZINE HYDROCHLORIDE 25 MG: 10 SOLUTION ORAL at 21:03

## 2022-05-17 RX ADMIN — ACETAMINOPHEN 325MG 650 MG: 325 TABLET ORAL at 04:09

## 2022-05-17 RX ADMIN — AMPICILLIN SODIUM AND SULBACTAM SODIUM 3 G: 2; 1 INJECTION, POWDER, FOR SOLUTION INTRAMUSCULAR; INTRAVENOUS at 21:34

## 2022-05-17 RX ADMIN — Medication 1 PACKET: at 07:42

## 2022-05-17 RX ADMIN — Medication 1 PACKET: at 16:09

## 2022-05-17 RX ADMIN — ACETAMINOPHEN 325MG 650 MG: 325 TABLET ORAL at 20:04

## 2022-05-17 RX ADMIN — SENNOSIDES AND DOCUSATE SODIUM 2 TABLET: 8.6; 5 TABLET ORAL at 20:04

## 2022-05-17 RX ADMIN — CYCLOBENZAPRINE HYDROCHLORIDE 5 MG: 5 TABLET, FILM COATED ORAL at 04:09

## 2022-05-17 RX ADMIN — Medication 1 PACKET: at 20:05

## 2022-05-17 ASSESSMENT — ACTIVITIES OF DAILY LIVING (ADL)
ADLS_ACUITY_SCORE: 20

## 2022-05-17 NOTE — PLAN OF CARE
Status: Admitted 5/4 with necrotizing soft tissue infection of the neck. S/p s&D of bilateral parapharyngeal spaces, sublingual space, bilateral necks, and extraction of teeth 5/4. Repeat OR 5/13 for I&D  Vitals: VSS on RA, max temp 99  Neuros: A&Ox4, decreased sensation to L side of face  IV: PIV TKO between antibiotics  Labs/Electrolytes: WNL. BG checks  Resp/trach: Frequent cough, uses oral suction independently   Diet: NPO, goal of 7 cans/day. TF restarted this afternoon, had 4/7  Bowel status: LBM 5/16 per pt  : Voiding  Skin: R chest tube site covered with primapore, L chest tube site  HIRAM. Midline chest wound repacked this afternoon by ENT, primapore/ABD changed. R chest wound dressing changed per order. PEG site WNL  Pain: Discomfort with movement, PRN tylenol/atarax effective  Activity: Independent  Plan: Dental consulted this afternoon, CT completed.   Will likely need addtl teeth extracted during this admission per dental resident

## 2022-05-17 NOTE — PLAN OF CARE
Status: Here for necrotizing soft tissue infection. 5-4 Thoracotomy, GTube and I & D of pharyngeal abscess. 5-13 exploration of neck .   VS: T 99.6 and 99.4 this shift.   GI: Has had 4/7 cans of feeding this shift.Loose Bm again this shift.  : Voiding spontaneously.   Respiratory: WNL. Good, strong cough. Oral secretions, self suctions.   Skin: Neck packing done by MD's this AM. Chest dressing changed as per order and R CT dressing changed as well. L chest tube site HIRAM. PEG dressing changed this AM, Site WNL.    IV: PIV SL'd between antibiotics.   Activity: Up independently in room.   Pain: Denies pain, but did take tylenol x 1 this AM.  Plan: Continue with cares as ordered.  Video swallow tomorrow at 1pm.

## 2022-05-17 NOTE — PROGRESS NOTES
"Otolaryngology Progress Note  05/17/22     SUBJECTIVE: No acute events overnight. Patient felling better. Has not been febrile. Still having some difficulty swallowing secretions, prefers to suction them with yankauer. Dental consult yesterday, plan to pull many teeth.     OBJECTIVE:   /67 (BP Location: Left arm)   Pulse 88   Temp 99.6  F (37.6  C) (Oral)   Resp 16   Ht 1.9 m (6' 2.8\")   Wt 98.4 kg (216 lb 14.9 oz)   SpO2 98%   BMI 27.26 kg/m     General: Alert and oriented x 3, No acute distress   HEENT: Anterior neck wound with packing removed, no purulence in wound. Wound appears increasingly bloody. Dakins soaked kerlix re-applied. Surrounding skin edges appear healthy, with mild deanne-incisional erythema. Intraoral wound on right probed with no purulent return. Left oral wound probed with end of q-tip and no purulence returned. Chest wound with bandage managed by Thoracic.   Pulmonary: Breathing non-labored, no stridor, no accessory muscle use.    LABS:  ROUTINE IP LABS (Last four results)  BMP  Recent Labs   Lab 05/17/22  0805 05/17/22  0752 05/17/22  0407 05/17/22  0149 05/16/22  1211 05/16/22  0906 05/15/22  1153 05/15/22  0808 05/14/22  0809 05/14/22  0703     --   --   --   --  137  --  135  135  --  137   POTASSIUM 3.8  --   --   --   --  3.8  --  4.0  4.0  --  4.0   CHLORIDE 104  --   --   --   --  104  --  104  104  --  106   OLYA 8.9  --   --   --   --  9.0  --  8.7  8.7  --  8.7   CO2 24  --   --   --   --  24  --  22  22  --  23   BUN 14  --   --   --   --  14  --  14  14  --  16   CR 0.78  --   --   --   --  0.77  --  0.68  0.76  --  0.84   GLC 99 109* 103* 215*   < > 94   < > 112*  112*   < > 127*    < > = values in this interval not displayed.     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    Neuro:  - Pain control: tylenol, oxycodone prn, dilaudid prn for dressing changes.  - Discussed addiction medicine consultation and patient is not currently interested. Will not place order at this time.    HEENT:  - ENT to perform BID packing changes to anterior neck and probing of intraoral wounds. Please keep dakin's solution and kerlix at bedside  - Thoracic surgery to manage inferior chest wound and PEG tube, please contact team with questions  - No plan for operative management today    Respiratory:  - supplemental O2 PRN to keep sats >92%    CV/heme:  - hemodynamically stable    FEN/GI:  - G-Tube, tube feeds running at bolus.   - Cleared for FLD by SLP however given his clinical status, holding off on PO at this time. Nutrition per G-tube. Plan for repeat VFSS with NSG soon.   - Tolerating bolus TF, appreciate recommendations per nutrition  - PRN antiemetics   - Bowel regimen: senna BID    :  - voiding independently    Endo  - No acute concerns  - Sliding scale    ID/heme:  - Acute drop in Hg to 6.9 today, rise in platelets to 600. Consider possible spurious lab values, plan to recheck CBC. If true anemia, will work up cause and transfuse.   - Necrotizing infection- labs pending. Will continue to closely clinically monitor. CMP added to evaluate hepatic function. If worsening fevers or clinical status will consider CT  Neck/Chest/Abdomen to rule out abdominal source as well, will continue to observe for now.   - Unasyn and fluconazole  - ID consulted, appreciate recommendations    PPX:  - Lovenox  - SCDs  - IS    Dispo: Pending resolution of fevers    -- Patient and above plan discussed with Dr Gore.     Marcelo Blackburn MD  Otolaryngology-Head & Neck Surgery PGY-1  Please contact ENT by dialing * [phone] and entering job code [phone].

## 2022-05-17 NOTE — PROGRESS NOTES
[facility]    Medicine Progress Note - Hospitalist Service, GOLD TEAM 5    Date of Admission:  5/4/2022    Assessment & Plan          Robert Gallo is a 26 year old male with no significant past medical history who [place] [facility] [place]rotizing fasciitis of neck with mediastinal involvement. He is now s/p I&D of bilateral parapharyngeal spaces, sublingual space, bilateral neck, extraction of teeth and bilateral VATS and PEG tube placement (5/4). Returned to OR on 5/13 for paraesophageal fluid collection.     Internal medicine is consulted for evaluation of possible other sites of infection in setting of ongoing fevers, transaminitis.      Recommendations:   - Agree with ongoing fluconazole, unasyn per ID  - If worsening fevers, agree with obtaining repeat CT of neck, chest. Could consider ruling out PE as well, though low suspicion clinically at this time.  - Recommend repeat LFT monitoring on 5/19. Improving trend.  - Monitor platelets     Necrotizing Fasciitis of Neck with Mediastinal Involvement s/p Emergent VATS, Right Anterior Mediastinotomy, and I&D of Bilateral Parapharyngeal Spaces, Bilateral Neck, and Sublingual Space  Presented with necrotizing fasciitis of neck/mediastinum following a dental infection. Initial debridement on 5/4, with repeat oral and neck explorations on 5/13 due to persistent purulence and fevers. Polymicrobial infection growing Strep anginosus, Staph epidermidis, Prevotella, Candida dubliniensis. CRP downtrending.  -ENT primary  -Thoracic Surgery, Infectious Disease following   - Unasyn, fluconazole per ID recommendations  - Consider repeat CT of neck and chest pending fever curve. Consider r/o PE as well.  - Pending BC with NGTD   - Continue wound cares per ENT      Transaminitis   Slightly elevated Alk phos, AST, ALT on 5/15. Uptrending from 5/9. Previous liver studies WNLs on 5/3. Hepatitis  serologies negative, HIV non-reactive. GGT slightly elevated. Patient w/o RUQ abdominal pain, only endorses some discomfort at PEG tube site. PEG site does not appear acutely infected. Unclear etiology, initially suspected related to unasyn use however improving despite ongoing use.  - Repeat CMP on 5/19  - If develops RUQ pain, would obtain abdominal US or further imaging of abdomen     Poor Dentition s/p Extraction of Teeth #19, #20  Patient with poor dentition and large areas of decay. Patient recently treated for dental infection prior to presentation at [facility].   - OMFS following remotely   - Dental recommending extraction of multiple teeth secondary to caries and periapical abscesses     Severe Protein-Calorie Malnutrition   S/p PEG placement  Per chart review, patient with significant weight loss in weeks prior to admission.   -Nutrition following  - TF per RD     Thrombocytosis  Plts 516 this AM. WNL at baseline. Suspect somewhat reactive process.  - Recommend monitoring with daily CBC with platelets     Acute Hypoxic Respiratory Failure, resolved   Intubated 5/3 - 5/9, currently on RA. Respiratory failure 2/2 upper airway obstruction from above infections with pulmonary edema contributing. Received diuresis while in ICU     Acute Blood Loss Anemia, resolved   Hgb 10.5, improving. Has not required blood transfusions.   -CBC daily      Methamphetamine Use  Patient notes intermittent use, declines inpatient chemical dependency/Addiction medicine consultations at this time. Readdressed and remains non-interested in addiction medicine.       Diet: Adult Formula Bolus Feeding: TID Osmolite 1.5; Route: Gastrostomy; 7; Can(s); Medication - Feeding Tube Flush Frequency: At least 15-30 mL water before and after medication administration and with tube clogging; Amount to Send (Nutrition use): +4 ...    DVT Prophylaxis: Defer to primary service  Richard Catheter: Not present  Central Lines: None  Cardiac  Monitoring: None  Code Status: Full Code         The patient's care was discussed with the Attending Physician, Dr. Peraza, Patient and Primary team via this note.    Internal Medicine will continue to follow for monitoring of LFTs, platelets and for assistance if needed. Please reach out with any further questions or concerns.    Anali Hall PA-C  Hospitalist Service, [address]  Securely message with the Vocera Web Console (learn more here)  Text page via [facility] Paging/Directory   Please see signed in provider for up to date coverage information  ______________________________________________________________________    Interval History   Notes reviewed. Patient transferred to [facility] for ongoing cares. Reports overall doing well. Only notes fevers if he is sleeping and will wake up sweaty. Still quite anxious about managing his secretions and is afraid of aspirating. Has been doing some incentive spirometry. Discussed methamphetamine use and denies IVDU. Using methamphetamine via inhalation once every couple weeks. Feels comfortable with cessation on his own. Notes significant soda use and wonders how to stop. Encouraged him to discuss with dental team.    4 point ROS is otherwise negative.    Data reviewed today: I reviewed all medications, new labs and imaging results over the last 24 hours. I personally reviewed no images or EKG's today.    Physical Exam   Vital Signs: Temp: 99.6  F (37.6  C) Temp src: Oral BP: 124/67 Pulse: 88   Resp: 16 SpO2: 98 % O2 Device: None (Room air)    Weight: 216 lbs 14.92 oz  General Appearance: Awake. Alert and oriented x4. Sitting up in bed. No acute distress.  HEENT: Normocephalic. Bandage to anterior neck in place. Mucous membranes moist.  Respiratory: Normal work of breathing on room air. Lungs CTAB. No wheezes.  Cardiovascular: RRR. S1, S2. No murmurs. Pedal pulses 2+ No lower extremity edema.  GI: Abdomen  non-distended. Normoactive. PEG in place. Soft, non-tender abdomen.  Skin: Warm, dry. No rashes on exposed skin.  Neuro: No focal deficits. CN II-XII grossly intact.     Data   Recent Labs   Lab 05/17/22  1220 05/17/22  0936 05/17/22  0805 05/17/22  0752 05/16/22  1211 05/16/22  0906 05/15/22  1153 05/15/22  0808   WBC  --  7.5 8.1  --   --  6.9  --  9.7   HGB  --  10.5* 6.9*  --   --  10.0*  --  10.5*   MCV  --  94 95  --   --  96  --  96   PLT  --  516* 600*  --   --  402  --  447   INR  --   --  1.13  --   --   --   --   --    NA  --   --  136  --   --  137  --  135  135   POTASSIUM  --   --  3.8  --   --  3.8  --  4.0  4.0   CHLORIDE  --   --  104  --   --  104  --  104  104   CO2  --   --  24  --   --  24  --  22  22   BUN  --   --  14  --   --  14  --  14  14   CR  --   --  0.78  --   --  0.77  --  0.68  0.76   ANIONGAP  --   --  8  --   --  9  --  9  9   OLYA  --   --  8.9  --   --  9.0  --  8.7  8.7   GLC 99  --  99 109*   < > 94   < > 112*  112*   ALBUMIN  --   --  2.5*  --   --   --   --  2.4*   PROTTOTAL  --   --  8.7  --   --   --   --  8.2   BILITOTAL  --   --  0.4  --   --   --   --  0.5   ALKPHOS  --   --  157*  --   --   --   --  176*   ALT  --   --  110*  --   --   --   --  130*   AST  --   --  44  --   --   --   --  59*    < > = values in this interval not displayed.     Recent Results (from the past 24 hour(s))   CT Dental wo Contrast    Narrative    CT DENTAL WO CONTRAST 5/16/2022 4:50 PM    History:  IP Consult to plan for dental work while admitted    Comparison:  CT dated 5/12/2022.      TECHNIQUE: 3-D reconstruction by the technologists, with curved  multiplanar reformat of thin section imaging through the mandible and  maxilla obtained without intravenous contrast.    FINDINGS:  Open wound over the left inferior neck. Subcutaneous emphysema within  the superficial and deep spaces of the lower neck.    The left mandibular molars are absent. Periapical lucency of the  second right  mandibular premolar. Periapical lucency of the maxillary  right lateral incisor. Periapical lucency of the left maxillary first  premolar and right maxillary first premolar. Retained bilateral  maxillary third molars. Multiple dental caries.    Mildly enlarged cervical lymph nodes at all levels are likely  reactive.    Mild polypoid mucosal thickening of the maxillary sinuses bilaterally.  The visualized mastoid air cells are clear. Normal temporomandibular  joints.      Impression    IMPRESSION:  1. Periapical abscesses of the second right mandibular premolar, right  maxillary lateral and lateral incisors, and bilateral maxillary first  premolars.   2. Extensive dental caries.  3. Partially visualized left anterior neck wound and soft tissue gas  in the lower neck.    I have personally reviewed the examination and initial interpretation  and I agree with the findings.    ARIANNA KELLY MD         SYSTEM ID:  [id number]

## 2022-05-17 NOTE — PROGRESS NOTES
Brief Entry:  Per ENT, pt declined offered psych/CD eval.    TWAN Etienne  Social Work, 6A  Phone:  [phone]  Pager:  [phone]  5/17/2022

## 2022-05-17 NOTE — PLAN OF CARE
Status: Here for necrotizing soft tissue infection. Multiple washouts and tooth extractions. POD #3 I&D.   VS: VSS on RA ex intermittent fevers (max 100.3).  Neuros: Intact. Decreased sensation on L cheek area. Patient mood increased.  GI: NPO, On bolus TF, Goal of 7 cans a day. Received 6/7 cans yesterday, given 2 cans overnight shift. BS+, no BM this shift. LBM 5/16.  : Voiding spontaneously.   Respiratory: WNL. Good, strong cough. Moderate oral secretions, self suctions.   Skin: Neck incision L and medial covered with dressing, packing done per ENT. Neck dressing with small-moderate serosanguinous drainage. L and R chest tube sites covered with dressings and  Sternal wound sites covered with dressings; redressed BID. Bruising throughout along with scabbing. Diffuse blanchable erythema noted near neck incision, marked. PEG site WNL.    IV: PIV SL in between antibiotics.  Activity: Up independently in room.   Pain: PRN flexeril and tylenol for neck pain.  Social: Patient sleeping in between cares.  Plan: Continue to monitor and follow POC. Dental consulted yesterday and will likely need additional teeth extracted during this admission per resident.

## 2022-05-17 NOTE — PROGRESS NOTES
General ID [facility] Service: Follow-up Note      Patient:  Robert Gallo, Date of birth 1995, Medical record number [id number]  Date of Visit:  May 9, 2022  Reason for consult: necrotizing neck infection         Assessment and Recommendations:     ID Problem list:  1. Necrotizing soft tissue infection of neck and mediastinum  2. S/p neck and mediastinum debridement 5/4/22 and 5/13 by ENT and thoracic surgery  3. Polymicrobial infection with prelim Strep anginosus, Staph epidermidis, Prevotella, Carlyn dubliniensis  4. Persistent fevers  5. Dental caries, s/p tooth extractions 5/4  6. Methamphetamine use, inhaled    Recs:  1.  No changes from our standpoint today. Fever curve improved yesterday however he received double the acetaminophen. Would repeat imaging in the coming days if still febrile.  2.  Continue fluconazole and Unasyn    Discussion:  Robert is a 26M with PMH including dental caries who was admitted 5/4 due to necrotizing soft tissue infection of the neck and mediastinum, now s/p debridement by ENT and thoracic surgery 5/4 and s/p VATS/chest tubes and PEG and also extraction of multiple teeth. His OR cultures have grown polymicrobial bacteria thus far including Strep anginosus, Staph epidermidis, and Prevotella, and candida. Currently on Unasyn and fluconazole. Repeat I&D on 5/13 and improving fever curve afterwards. Continuing to monitor for clinical change, may need repeat imaging in the coming days if still febrile.    Thank you for allowing us to participate in the care of this patient. ID will continue to follow.    Yg Douglas MD  Infectious Disease Fellow    ID Staff Assessment and Plan:   Patient was seen and examined by me with the ID Fellow. The note above reflects our joint assessment and recommendations. I have reviewed the medical record, labs, imaging, vitals signs and have discussed the patient with the ID fellow in detail. In summary would continue the current antibiotics  and plan at least a 14 day course of the fluconazole and a 14 day course of the Unasyn. If patient's fevers resolve and he is taking oral medications could change the fluconazole to oral or down the G-tube and when ready to go home convert the IV Unasyn to oral Augmentin or put Augmentin in the G tube.  If persistent fevers would re-image the neck and chest to rule out un-drained abscess in the neck or mediastinal spaces.     Carla Arevalo MD  ID staff physician  Pager [phone]           Interval History:     24h events reviewed. Stable and feeling improved. Still has issues with swallowing but otherwise stable.         Review of Systems:     8 point ROS negative unless noted above         Current Antimicrobials     Unasyn  Fluconazole         Physical Exam:   Ranges for vital signs:  Temp:  [96.9  F (36.1  C)-100.3  F (37.9  C)] 96.9  F (36.1  C)  Pulse:  [88-98] 98  Resp:  [16] 16  BP: (115-129)/(62-71) 129/71  SpO2:  [97 %-99 %] 98 %    Exam:  GENERAL:  Awake and alert, pleasant  ENT/NECK:  Head is normocephalic, atraumatic. Serous discharge on neck dressing  LUNGS:  Breathing comfortably on room air. Basilar crackles resolved  CARDIOVASCULAR:  Regular rate and rhythm. No murmurs.   ABDOMEN:  Non-distended, soft, nontender.  NEUROLOGIC:  Awake and orientated  LINES: PIVs without erythema or drainage         Laboratory Data:       Inflammatory Markers    Recent Labs   Lab Test 05/17/22  0805 05/16/22  0906 05/15/22  0808 05/14/22  0703 05/13/22  0849 05/12/22  0418 05/11/22  0345 05/10/22  0557   * 96* 82* 74* 81* 91* 98* 96*   CRP 67.0* 77.0* 120.0* 130.0* 100.0* 120.0* 150.0* 150.0*       Hematology Studies    Recent Labs   Lab Test 05/17/22  0936 05/17/22  0805 05/16/22  0906 05/15/22  0808 05/14/22  0703 05/13/22  0849   WBC 7.5 8.1 6.9 9.7 16.4* 11.5*   ANEU 5.9  --   --   --   --   --    AEOS 0.1  --   --   --   --   --    HGB 10.5* 6.9* 10.0* 10.5* 10.9* 11.1*   MCV 94 95 96 96 93 95   * 600* 402  447 415 410       Metabolic Studies     Recent Labs   Lab Test 05/17/22  0805 05/16/22  0906 05/15/22  0808 05/14/22  0703 05/13/22  0849    137 135  135 137 137   POTASSIUM 3.8 3.8 4.0  4.0 4.0 3.9   CHLORIDE 104 104 104  104 106 106   CO2 24 24 22  22 23 26   BUN 14 14 14  14 16 17   CR 0.78 0.77 0.68  0.76 0.84 0.96   GFRESTIMATED >90 >90 >90  >90 >90 >90       Hepatic Studies    Recent Labs   Lab Test 05/17/22  0805 05/15/22  0808 05/09/22  0730 05/08/22  0456 05/04/22  0557   BILITOTAL 0.4 0.5 0.6 0.6 1.5*   ALKPHOS 157* 176* 164* 185* 93   ALBUMIN 2.5* 2.4* 1.9* 1.8* 2.0*   AST 44 59* 75* 120* 32   * 130* 110* 126* 26       Microbiology:  Culture   Date Value Ref Range Status   05/16/2022 No growth after 1 day  Preliminary   05/16/2022 No growth after 1 day  Preliminary   05/15/2022 No growth after 2 days  Preliminary   05/15/2022 No growth after 2 days  Preliminary   05/09/2022 No Growth  Final   05/09/2022 No Growth  Final   05/08/2022 No Growth  Final   05/08/2022 No Growth  Final   05/06/2022 2+ Mixed Aerobic and Anaerobic roslyn  Final   05/06/2022 1+ Normal roslyn  Final   05/06/2022 4+ Carlyn dubliniensis (A)  Final     Comment:     Susceptibilities not routinely done   05/06/2022 3+ Carlyn dubliniensis (A)  Final   05/06/2022 1+ Carlyn dubliniensis (A)  Final     Comment:     Susceptibilities not routinely done   05/04/2022 No Growth  Final   05/04/2022 No Growth  Final   05/04/2022 1+ Prevotella species (A)  Final     Comment:     Susceptibilities not routinely done   05/04/2022 2+ Mixed Aerobic and Anaerobic roslyn  Final   05/04/2022 Carlyn dubliniensis (A)  Preliminary   05/04/2022 1+ Streptococcus anginosus (A)  Final     Comment:     This organism is susceptible to ampicillin, penicillin, vancomycin and the cephalosporins. If treatment is required and your patient is allergic to penicillin, contact the microbiology lab within 5 days to request susceptibility testing.    05/04/2022 1+ Normal roslyn  Final   05/04/2022 2+ Prevotella species (A)  Final     Comment:     Susceptibilities not routinely done   05/04/2022 2+ Prevotella species (A)  Final     Comment:     Susceptibilities not routinely done   05/04/2022 4+ Mixed Aerobic and Anaerobic roslyn  Final   05/04/2022 No growth after 13 days  Preliminary   05/04/2022 1+ Streptococcus anginosus (A)  Final     Comment:     This organism is susceptible to ampicillin, penicillin, vancomycin and the cephalosporins. If treatment is required and your patient is allergic to penicillin, contact the microbiology lab within 5 days to request susceptibility testing.   05/04/2022 1+ Staphylococcus epidermidis (A)  Corrected

## 2022-05-18 ENCOUNTER — APPOINTMENT (OUTPATIENT)
Dept: OCCUPATIONAL THERAPY | Facility: CLINIC | Age: 27
End: 2022-05-18
Payer: COMMERCIAL

## 2022-05-18 ENCOUNTER — APPOINTMENT (OUTPATIENT)
Dept: GENERAL RADIOLOGY | Facility: CLINIC | Age: 27
End: 2022-05-18
Attending: PHYSICIAN ASSISTANT
Payer: COMMERCIAL

## 2022-05-18 ENCOUNTER — APPOINTMENT (OUTPATIENT)
Dept: GENERAL RADIOLOGY | Facility: CLINIC | Age: 27
End: 2022-05-18
Payer: COMMERCIAL

## 2022-05-18 ENCOUNTER — APPOINTMENT (OUTPATIENT)
Dept: SPEECH THERAPY | Facility: CLINIC | Age: 27
End: 2022-05-18
Payer: COMMERCIAL

## 2022-05-18 LAB
ANION GAP SERPL CALCULATED.3IONS-SCNC: 9 MMOL/L (ref 3–14)
BUN SERPL-MCNC: 17 MG/DL (ref 7–30)
CALCIUM SERPL-MCNC: 9.5 MG/DL (ref 8.5–10.1)
CHLORIDE BLD-SCNC: 103 MMOL/L (ref 94–109)
CO2 SERPL-SCNC: 26 MMOL/L (ref 20–32)
CREAT SERPL-MCNC: 0.77 MG/DL (ref 0.66–1.25)
CRP SERPL-MCNC: 52 MG/L (ref 0–8)
ERYTHROCYTE [DISTWIDTH] IN BLOOD BY AUTOMATED COUNT: 13.8 % (ref 10–15)
ERYTHROCYTE [SEDIMENTATION RATE] IN BLOOD BY WESTERGREN METHOD: 91 MM/HR (ref 0–15)
GFR SERPL CREATININE-BSD FRML MDRD: >90 ML/MIN/1.73M2
GLUCOSE BLD-MCNC: 105 MG/DL (ref 70–99)
GLUCOSE BLDC GLUCOMTR-MCNC: 111 MG/DL (ref 70–99)
GLUCOSE BLDC GLUCOMTR-MCNC: 128 MG/DL (ref 70–99)
GLUCOSE BLDC GLUCOMTR-MCNC: 85 MG/DL (ref 70–99)
GLUCOSE BLDC GLUCOMTR-MCNC: 95 MG/DL (ref 70–99)
GLUCOSE BLDC GLUCOMTR-MCNC: 96 MG/DL (ref 70–99)
HCT VFR BLD AUTO: 31.5 % (ref 40–53)
HGB BLD-MCNC: 9.9 G/DL (ref 13.3–17.7)
MAGNESIUM SERPL-MCNC: 2.3 MG/DL (ref 1.6–2.3)
MCH RBC QN AUTO: 29.7 PG (ref 26.5–33)
MCHC RBC AUTO-ENTMCNC: 31.4 G/DL (ref 31.5–36.5)
MCV RBC AUTO: 95 FL (ref 78–100)
PHOSPHATE SERPL-MCNC: 4.8 MG/DL (ref 2.5–4.5)
PLATELET # BLD AUTO: 494 10E3/UL (ref 150–450)
POTASSIUM BLD-SCNC: 3.7 MMOL/L (ref 3.4–5.3)
RBC # BLD AUTO: 3.33 10E6/UL (ref 4.4–5.9)
SARS-COV-2 RNA RESP QL NAA+PROBE: NEGATIVE
SODIUM SERPL-SCNC: 138 MMOL/L (ref 133–144)
WBC # BLD AUTO: 6.6 10E3/UL (ref 4–11)

## 2022-05-18 PROCEDURE — 92611 MOTION FLUOROSCOPY/SWALLOW: CPT | Mod: GN

## 2022-05-18 PROCEDURE — 250N000013 HC RX MED GY IP 250 OP 250 PS 637: Performed by: STUDENT IN AN ORGANIZED HEALTH CARE EDUCATION/TRAINING PROGRAM

## 2022-05-18 PROCEDURE — U0003 INFECTIOUS AGENT DETECTION BY NUCLEIC ACID (DNA OR RNA); SEVERE ACUTE RESPIRATORY SYNDROME CORONAVIRUS 2 (SARS-COV-2) (CORONAVIRUS DISEASE [COVID-19]), AMPLIFIED PROBE TECHNIQUE, MAKING USE OF HIGH THROUGHPUT TECHNOLOGIES AS DESCRIBED BY CMS-2020-01-R: HCPCS | Performed by: PHYSICIAN ASSISTANT

## 2022-05-18 PROCEDURE — 36415 COLL VENOUS BLD VENIPUNCTURE: CPT | Performed by: STUDENT IN AN ORGANIZED HEALTH CARE EDUCATION/TRAINING PROGRAM

## 2022-05-18 PROCEDURE — 84100 ASSAY OF PHOSPHORUS: CPT | Performed by: STUDENT IN AN ORGANIZED HEALTH CARE EDUCATION/TRAINING PROGRAM

## 2022-05-18 PROCEDURE — 97535 SELF CARE MNGMENT TRAINING: CPT | Mod: GO | Performed by: OCCUPATIONAL THERAPIST

## 2022-05-18 PROCEDURE — 85027 COMPLETE CBC AUTOMATED: CPT | Performed by: STUDENT IN AN ORGANIZED HEALTH CARE EDUCATION/TRAINING PROGRAM

## 2022-05-18 PROCEDURE — 250N000011 HC RX IP 250 OP 636: Performed by: STUDENT IN AN ORGANIZED HEALTH CARE EDUCATION/TRAINING PROGRAM

## 2022-05-18 PROCEDURE — 99207 PR APP CREDIT; MD BILLING SHARED VISIT: CPT | Performed by: PHYSICIAN ASSISTANT

## 2022-05-18 PROCEDURE — 71045 X-RAY EXAM CHEST 1 VIEW: CPT | Mod: 26 | Performed by: RADIOLOGY

## 2022-05-18 PROCEDURE — 99232 SBSQ HOSP IP/OBS MODERATE 35: CPT | Performed by: INTERNAL MEDICINE

## 2022-05-18 PROCEDURE — 250N000013 HC RX MED GY IP 250 OP 250 PS 637: Performed by: PHYSICIAN ASSISTANT

## 2022-05-18 PROCEDURE — 74230 X-RAY XM SWLNG FUNCJ C+: CPT | Mod: 26 | Performed by: RADIOLOGY

## 2022-05-18 PROCEDURE — 85652 RBC SED RATE AUTOMATED: CPT | Performed by: STUDENT IN AN ORGANIZED HEALTH CARE EDUCATION/TRAINING PROGRAM

## 2022-05-18 PROCEDURE — 97530 THERAPEUTIC ACTIVITIES: CPT | Mod: GO | Performed by: OCCUPATIONAL THERAPIST

## 2022-05-18 PROCEDURE — 250N000009 HC RX 250

## 2022-05-18 PROCEDURE — 250N000011 HC RX IP 250 OP 636: Performed by: PHYSICIAN ASSISTANT

## 2022-05-18 PROCEDURE — 250N000009 HC RX 250: Performed by: PHYSICIAN ASSISTANT

## 2022-05-18 PROCEDURE — 86140 C-REACTIVE PROTEIN: CPT | Performed by: STUDENT IN AN ORGANIZED HEALTH CARE EDUCATION/TRAINING PROGRAM

## 2022-05-18 PROCEDURE — 120N000002 HC R&B MED SURG/OB UMMC

## 2022-05-18 PROCEDURE — 92526 ORAL FUNCTION THERAPY: CPT | Mod: GN

## 2022-05-18 PROCEDURE — 80048 BASIC METABOLIC PNL TOTAL CA: CPT | Performed by: STUDENT IN AN ORGANIZED HEALTH CARE EDUCATION/TRAINING PROGRAM

## 2022-05-18 PROCEDURE — 74230 X-RAY XM SWLNG FUNCJ C+: CPT

## 2022-05-18 PROCEDURE — 99232 SBSQ HOSP IP/OBS MODERATE 35: CPT | Performed by: ORTHOPAEDIC SURGERY

## 2022-05-18 PROCEDURE — 71045 X-RAY EXAM CHEST 1 VIEW: CPT

## 2022-05-18 PROCEDURE — 83735 ASSAY OF MAGNESIUM: CPT | Performed by: STUDENT IN AN ORGANIZED HEALTH CARE EDUCATION/TRAINING PROGRAM

## 2022-05-18 RX ORDER — LIDOCAINE HYDROCHLORIDE AND EPINEPHRINE 10; 10 MG/ML; UG/ML
20 INJECTION, SOLUTION INFILTRATION; PERINEURAL ONCE
Status: COMPLETED | OUTPATIENT
Start: 2022-05-18 | End: 2022-05-18

## 2022-05-18 RX ORDER — FLUCONAZOLE 200 MG/1
400 TABLET ORAL DAILY
Status: DISCONTINUED | OUTPATIENT
Start: 2022-05-19 | End: 2022-05-26

## 2022-05-18 RX ORDER — BARIUM SULFATE 400 MG/ML
SUSPENSION ORAL ONCE
Status: COMPLETED | OUTPATIENT
Start: 2022-05-18 | End: 2022-05-18

## 2022-05-18 RX ADMIN — CYCLOBENZAPRINE HYDROCHLORIDE 5 MG: 5 TABLET, FILM COATED ORAL at 05:39

## 2022-05-18 RX ADMIN — LIDOCAINE HYDROCHLORIDE,EPINEPHRINE BITARTRATE 20 ML: 10; .01 INJECTION, SOLUTION INFILTRATION; PERINEURAL at 09:44

## 2022-05-18 RX ADMIN — ONDANSETRON 4 MG: 4 TABLET, ORALLY DISINTEGRATING ORAL at 00:32

## 2022-05-18 RX ADMIN — AMPICILLIN SODIUM AND SULBACTAM SODIUM 3 G: 2; 1 INJECTION, POWDER, FOR SOLUTION INTRAMUSCULAR; INTRAVENOUS at 11:20

## 2022-05-18 RX ADMIN — Medication 1 PACKET: at 19:54

## 2022-05-18 RX ADMIN — AMPICILLIN SODIUM AND SULBACTAM SODIUM 3 G: 2; 1 INJECTION, POWDER, FOR SOLUTION INTRAMUSCULAR; INTRAVENOUS at 16:14

## 2022-05-18 RX ADMIN — Medication 1 PACKET: at 16:24

## 2022-05-18 RX ADMIN — ENOXAPARIN SODIUM 90 MG: 100 INJECTION SUBCUTANEOUS at 00:33

## 2022-05-18 RX ADMIN — ACETAMINOPHEN 325MG 650 MG: 325 TABLET ORAL at 16:23

## 2022-05-18 RX ADMIN — CYCLOBENZAPRINE HYDROCHLORIDE 5 MG: 5 TABLET, FILM COATED ORAL at 16:23

## 2022-05-18 RX ADMIN — ACETAMINOPHEN 325MG 650 MG: 325 TABLET ORAL at 09:39

## 2022-05-18 RX ADMIN — HYOSCYAMINE SULFATE: 16 SOLUTION at 19:54

## 2022-05-18 RX ADMIN — Medication 1 PACKET: at 09:12

## 2022-05-18 RX ADMIN — Medication 2 PACKET: at 19:54

## 2022-05-18 RX ADMIN — Medication 2 PACKET: at 09:10

## 2022-05-18 RX ADMIN — AMPICILLIN SODIUM AND SULBACTAM SODIUM 3 G: 2; 1 INJECTION, POWDER, FOR SOLUTION INTRAMUSCULAR; INTRAVENOUS at 05:37

## 2022-05-18 RX ADMIN — BARIUM SULFATE 30 ML: 400 SUSPENSION ORAL at 13:25

## 2022-05-18 RX ADMIN — FLUCONAZOLE 400 MG: 2 INJECTION INTRAVENOUS at 11:55

## 2022-05-18 RX ADMIN — SILVER NITRATE APPLICATORS: 25; 75 STICK TOPICAL at 11:31

## 2022-05-18 RX ADMIN — APIXABAN 10 MG: 5 TABLET, FILM COATED ORAL at 19:53

## 2022-05-18 RX ADMIN — APIXABAN 10 MG: 5 TABLET, FILM COATED ORAL at 12:00

## 2022-05-18 RX ADMIN — BARIUM SULFATE 15 ML: 400 SUSPENSION ORAL at 13:25

## 2022-05-18 RX ADMIN — HYOSCYAMINE SULFATE: 16 SOLUTION at 07:00

## 2022-05-18 RX ADMIN — AMPICILLIN SODIUM AND SULBACTAM SODIUM 3 G: 2; 1 INJECTION, POWDER, FOR SOLUTION INTRAMUSCULAR; INTRAVENOUS at 21:28

## 2022-05-18 ASSESSMENT — ACTIVITIES OF DAILY LIVING (ADL)
ADLS_ACUITY_SCORE: 20
ADLS_ACUITY_SCORE: 22
ADLS_ACUITY_SCORE: 22
ADLS_ACUITY_SCORE: 20

## 2022-05-18 NOTE — PLAN OF CARE
"\"A.O. rm 205 6A ENT primary (thoracic involved)    Should evening lovenox be held d/t chest would bleeding? Thanks Matthew [phone]\"        Paged thoracic surgery on call resident (d/t concerns for chest wound re-bleeding)   at 0022        Resident called back at 0023 and stated lovenox does not need to be held.   "

## 2022-05-18 NOTE — PROGRESS NOTES
CLINICAL NUTRITION SERVICES - REASSESSMENT NOTE     Nutrition Prescription    RECOMMENDATIONS FOR MDs/PROVIDERS TO ORDER:  - None at this time    Malnutrition Status:    - Patient does not meet two of the established criteria necessary for diagnosing malnutrition      Recommendations already ordered by Registered Dietitian (RD):  - Order new wt  - Continue TF plus Prosource modular as ordered    Future/Additional Recommendations:  - Monitor TF tolerance and adequacy of intakes  - Monitor for initiation of po diet per SLP recommendation, pending video swallow study today - offer supplements as appropriate if/when diet advanced, need for calorie count monitoring.     EVALUATION OF THE PROGRESS TOWARD GOALS   Diet: No order at this time    Nutrition Support: Continues on TF via G tube consisting of Osmolite 1.5 with goal of 7 cans per day ( 2 cans TID plus 1 can x 1 feeding), plus Prosource modular x 2 pkts BID    Intake: Ave TF intakes received x 6 days = 1190 ml which provides 1785 kcals and 75 g PRO     - Ave TF intakes received x 7 days = 3.1 pkts per day which provides 124 kcals and 34 g PRO    - Total nutritional intakes from TF plus Prosource pkts x 7 days = 1909 kcals  (22 kcals/kg) and 109 g PRO (1.2 g PRO/kg)    NEW FINDINGS   Per chart review, SLP recommending full liquid diet (moderately-thick consistency, level 3), no straws per evaluation today.      Weights: 98.4 kg (5/11 - last wt obtained)    Labs:  K+ and Mg++ WNL  PO4 4.8 (high) today, 4.2 (WNL) yesterday    Meds:  Nutrisource fiber 1 pkt QID    MALNUTRITION (limited to upper extremities d/t pt wearing street clothing)  % Intake: Decreased intake does not meet criteria  % Weight Loss: Unable to assess d/t no new wt x past week.  Subcutaneous Fat Loss: None observed  Muscle Loss: None observed  Fluid Accumulation/Edema: None noted  Malnutrition Diagnosis: Patient does not meet two of the established criteria necessary for diagnosing  malnutrition    Previous Goals   Total avg nutritional intake to meet a minimum of 25 kcal/kg and 1.2 g PRO/kg daily (per dosing wt 88 kg).    Evaluation: Not met    Previous Nutrition Diagnosis  Inadequate energy intake related to reliance on enteral nutrition with potential for interruption during hospitalization as evidenced by NPO status, tube feed volume past 7 days meeting <75% assessed energy needs.    Evaluation: No change    CURRENT NUTRITION DIAGNOSIS  Inadequate energy intake related to remains dependent on TF, but goal infusion vol not met as evidenced by total nutritional intakes from TF plus Prosource pkts x 7 days meeting only 22 kcals/kg.    INTERVENTIONS  Implementation  Enteral Nutrition - as ordered    Goals  Total avg nutritional intake to meet a minimum of 25 kcal/kg and 1.2 g PRO/kg daily (per dosing wt 88 kg).    Monitoring/Evaluation  Progress toward goals will be monitored and evaluated per protocol.    Tegan Guzman RD,LD  6A pager [phone]

## 2022-05-18 NOTE — PROGRESS NOTES
"Otolaryngology Progress Note  05/1/22     SUBJECTIVE: No acute events overnight.    OBJECTIVE:   /71 (BP Location: Left arm)   Pulse 95   Temp 99.5  F (37.5  C) (Oral)   Resp 16   Ht 1.9 m (6' 2.8\")   Wt 98.4 kg (216 lb 14.9 oz)   SpO2 96%   BMI 27.26 kg/m     General: Alert and oriented x 3, No acute distress   HEENT: Anterior neck wound with packing removed, no purulence in wound. Wound appears increasingly bloody. Dakins soaked kerlix re-applied. Surrounding skin edges appear healthy, with mild deanne-incisional erythema. Chest wound with quite hemorrhagic granulation tissue. I removed some of this and applied silver nitrate and surgicel. Will return with more silver nitrate vs high temp. Plan to close the lateral neck today and apply wound vac if tolerated.    Pulmonary: Breathing non-labored, no stridor, no accessory muscle use.    LABS:  ROUTINE IP LABS (Last four results)  BMP  Recent Labs   Lab 05/18/22  0808 05/18/22  0635 05/18/22  0259 05/17/22 2008 05/17/22  1556 05/17/22  1220 05/17/22  0805 05/16/22  1211 05/16/22  0906 05/15/22  1153 05/15/22  0808 05/14/22  0809 05/14/22  0703   NA  --  138  --   --   --   --  136  --  137  --  135  135  --  137   POTASSIUM  --  3.7  --   --   --   --  3.8  --  3.8  --  4.0  4.0  --  4.0   CHLORIDE  --  103  --   --   --   --  104  --  104  --  104  104  --  106   OLYA  --   --   --   --   --   --  8.9  --  9.0  --  8.7  8.7  --  8.7   CO2  --   --   --   --   --   --  24  --  24  --  22  22  --  23   BUN  --   --   --   --   --   --  14  --  14  --  14  14  --  16   CR  --   --   --   --   --   --  0.78  --  0.77  --  0.68  0.76  --  0.84   *  --  85 107* 114*   < > 99   < > 94   < > 112*  112*   < > 127*    < > = values in this interval not displayed.     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    Neuro:  - Pain control: tylenol, oxycodone prn, dilaudid prn for dressing changes.  - Discussed addiction medicine consultation and patient is not currently interested. Will not place order at this time.    HEENT:  - ENT to perform BID packing changes to anterior neck and probing of intraoral wounds. Please keep dakin's solution and kerlix at bedside  - Thoracic surgery to manage inferior chest wound and PEG tube, please contact team with questions    Respiratory:  - supplemental O2 PRN to keep sats >92%    CV/heme:  - hemodynamically stable    FEN/GI:  - G-Tube, tube feeds running at bolus.   - Cleared for FLD by SLP however given his clinical status, holding off on PO at this time. Plan for repeat VFSS.   - Tolerating bolus TF, appreciate recommendations per nutrition  - PRN antiemetics   - Bowel regimen: senna BID    :  - voiding independently    Endo  - No acute concerns  - Sliding scale    ID/heme:  - Necrotizing infection- labs pending. Will continue to closely clinically monitor. CMP added to evaluate hepatic function. If worsening fevers or clinical status will consider CT Neck/Chest/Abdomen to rule out abdominal source as well, will continue to observe for now.   - Unasyn and fluconazole, fluconazole for 2 weeks after last surgery/  - ID consulted, appreciate  recommendations    PPX:  - Lovenox, will follow up with duration of anticoagulation for internal jugular thrombosis   - SCDs  - IS    Dispo: Pending resolution of fevers    -- Patient and above plan discussed with Dr Gore.     Everardo Hunter  Otolaryngology-Head & Neck Surgery   Please contact ENT by dialing [phone] and entering job code [phone].

## 2022-05-18 NOTE — PROGRESS NOTES
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  Wound neck applied by ENT on 5/18/22 prior to VFSS.  VFSS completed per PA order.   General Observations Pt awake and alert, conversational   Type of Evaluation   Type of Evaluation Swallow Evaluation   Dentition (Oral Motor)   Comment, Dentition (Oral Motor) s/p teeth extractions   Dentition (Oral Motor) poor condition;some missing teeth   VFSS Evaluation   Radiologist [facility] resident   Views Taken left lateral   Physical Location of Procedure radiology suite #5   VFSS Textures Trialed thin liquids;mildly thick liquids;moderately thick liquids/liquidized;pureed;soft & bite-sized   VFSS Eval: Thin Liquid Texture Trial   Mode of Presentation, Thin Liquid spoon;cup;self-fed   Order of Presentation 1, 2, 3, 4, 5   Preparatory Phase WFL   Oral Phase, Thin Liquid Premature pharyngeal entry   Pharyngeal Phase, Thin Liquid Delayed swallow reflex;Residue in valleculae;Residue in pyriform sinus;Pharyngeal wall coating   Rosenbek's Penetration Aspiration Scale: Thin Liquid Trial Results 7 - contrast passes glottis, visible subglottic residue remains despite patient's response (aspiration)   Response to Aspiration   (productive cough, unable to clear trachea)   Diagnostic Statement Jarocho aspiration present,  use of chin tuck decreased airway protection.  Supraglottic swallow improved airway protection, but did not eliminate aspiration   VFSS Eval: Mildly Thick Liquids   Mode of Presentation cup;spoon;straw;fed by clinician   Order of Presentation 6, 7, 11   Preparatory Phase WFL   Oral Phase WFL   Pharyngeal Phase Delayed swallow reflex;Pharyngeal wall coating;Residue in valleculae;Residue in pyriform sinus   Rosenbek's Penetration Aspiration Scale 7 - contrast passes glottis, visible subglottic residue remains despite patient's response (aspiration)   Response to Aspiration unproductive reflexive involuntary cough/throat clear   Diagnostic Statement Jarocho aspiration present without use of supraglottic swallow.  Supragloittic swallow cleared majority of material from laryngeal vestibule   VFSS Eval: Moderately Thick Liquids   Mode of Presentation cup   Order of Presentation 12, 13   Preparatory Phase WFL   Pharyngeal Phase Delayed swallow reflex;Pharyngeal wall coating;Residue in valleculae;Residue in pyriform sinus   Rosenbek's Penetration Aspiration Scale 3 - contrast remains above the vocal cords, visible residue remains (penetration)   Diagnostic Statement Shallow penetration with moderately-thick via cup sips, minimal residue present in laryngeal vestibule   VFSS Evaluation: Puree Solid Texture Trial   Mode of Presentation, Puree spoon;self-fed   Order of Presentation 8, 9   Preparatory Phase WFL   Pharyngeal Phase, Puree Delayed swallow reflex;Residue in valleculae;Residue in pyriform sinus;Pharyngeal wall coating   Rosenbek's Penetration Aspiration Scale: Puree Food Trial Results 2 - contrast enters airway, remains above the vocal cords, no residue remains (penetration)   Diagnostic Statement Shallow penetration with moderately-thick via cup sips, no residue present in laryngeal vestibule   VFSS Eval: Soft & Bite Sized   Mode of Presentation self-fed   Order of presentation 10   Preparatory Phase WFL   Oral Phase  Delayed AP movement   Pharyngeal Phase Delayed swallow reflex;Pharyngeal wall coating;Residue in valleculae;Residue in pyriform sinus   Rosenbek's Penetration Aspiration Scale 1 - no aspiration, contrast does not enter airway   Diagnostic Statement No aspiration or penetration, significant pharyngeal residue   Esophageal Phase of Swallow   Patient reports or presents with symptoms of esophageal dysphagia No   Swallowing Recommendations   Diet Consistency Recommendations moderately thick liquids/liquidized (level 3);full liquid diet   Supervision Level for Intake distant supervision needed   Mode of Delivery Recommendations bolus size, small;no straws;slow rate of intake   Swallowing Maneuver Recommendations alternate food and liquid intake;effortful (hard) swallow   Monitoring/Assistance Required (Eating/Swallowing) monitor for cough or change in vocal quality with intake;stop eating activities when fatigue is present   Recommended Feeding/Eating Techniques (Swallow Eval) maintain upright sitting position for eating;provide 6 smaller meals throughout day;schedule meals prior to therapy to avoid therapy fatigue   Medication Administration Recommendations, Swallowing (SLP) pills via FT or crushed in puree   Instrumental Assessment Recommendations VFSS (videofluoroscopic swallowing study)  (in 2-3 weeks pending progress)   General Therapy Interventions   Planned Therapy Interventions Dysphagia Treatment   Dysphagia treatment Oropharyngeal exercise training;Modified diet education;Instruction of safe swallow strategies;Compensatory strategies for swallowing   Clinical Impression   Criteria for Skilled Therapeutic Interventions Met (SLP Eval) Yes, treatment indicated   SLP Diagnosis Moderate-severe oropharyngeal dysphagia   Risks & Benefits of therapy have been explained evaluation/treatment results reviewed;care plan/treatment goals reviewed;risks/benefits reviewed;current/potential barriers reviewed;participants voiced  "agreement with care plan;participants included;patient   Clinical Impression Comments Video swallow study completed per PA orders. Pt presents with moderate-severe oropharyngeal dysphagia under fluoroscopy. Pt's swallow function characterized by reduced BOT retraction, UES retention, limited anterior hyoid excursion resulting reduced hyolaryngeal elevation, reduced laryngeal vestibule closure, and incomplete epiglottic inversion. Pt assessed with thin, mildly-thick, moderately-thick, puree and semisolid texture.  Premature pharyngeal entry with thin liquids.  Swallow initiation at pyriform sinuses for thin and mildly-thick liquids, and at the posterior surface of the epiglottis with moderately-thick liquids, puree, and semisolid texture.  Jarocho aspiration with thin liquids with and without use of supraglottic swallow strategy, which occurred during and after the swallow.  Jarocho aspiration during the swallow with mildly-thick liquids without use of supraglottic swallow.  Use of suproglottic swallow while consuming mildly-thick liquids improved airway invasion and pt was able to clear laryngeal vestibule of residue.  Shallow penetration with moderately-thick liquids via cup, puree, semisolid texture.  Minimal moderately-thick residue present in laryngeal vestibule after the swallow.  Chin tuck strategy and head turn strategy did not improve airway protection. After the swallow, pt with mild-moderate pharyngeal residue in valleculae, posterior pharyngeal wall, and pyriforms which increased with semisolid texture.  Pt able was able partially clear residue with liquid rinse, pt noted to have good awareness as to when this residue was present.  Pt noting today his preference to consume thicker liquids without \"needing to do anything extra\", i.e. use of supraglottic swallow.  Recommend full liquid diet (moderately-thick consistency, level 3), no straws. Pt should be fully upright for all PO and complete excellent oral " cares. Recommend additional hard swallow after each bite and sip, and for pt to alternate bites/sips.  Recommend repeat VFSS in 2-3 weeks pending progress. ST to continue to follow targeting PO tolerance, completion of oropharyngeal exercises, and advancement as appropriate. Anticipate pt will require OP SLP follow-up at discharge targeting dysphagia.   SLP Discharge Planning   SLP Discharge Recommendation home with outpatient speech therapy;home with assist   SLP Rationale for DC Rec pt well-below baseline oropharyngeal swallowing skills. Recommend repeat VFSS.   SLP Brief overview of current status  Recommend full liquid diet (moderately-thick consistency, level 3), no straws. Pt should be fully upright for all PO and complete excellent oral cares. Recommend additional hard swallow after each bite and sip, and for pt to alternate bites/sips.  Recommend repeat VFSS in 2-3 weeks pending progress. ST to continue to follow targeting PO tolerance, completion of oropharyngeal exercises, and advancement as appropriate. Anticipate pt will require OP SLP follow-up at discharge targeting dysphagia.   SLP Goals   Therapy Frequency (SLP Eval) 5 times/wk   SLP Predicted Duration/Target Date for Goal Attainment 07/08/22   SLP Goals Swallow   SLP: Safely tolerate diet without signs/symptoms of aspiration One P.O. texture;With use of swallow precautions   SLP: Goal 1 pt will complete 5-10 reps of oropharyngeal strengthing exercises with 90% accuracy and minimal clinician cues

## 2022-05-18 NOTE — PROGRESS NOTES
[facility]    Medicine Progress Note - Hospitalist Service, GOLD TEAM 5    Date of Admission:  5/4/2022    Assessment & Plan          Robert Gallo is a 26 year old male with no significant past medical history who initially presented as transfer from [facility] for management of necrotizing fasciitis of neck with mediastinal involvement. He is now s/p I&D of bilateral parapharyngeal spaces, sublingual space, bilateral neck, extraction of teeth and bilateral VATS and PEG tube placement (5/4). Returned to OR on 5/13 for paraesophageal fluid collection.      Internal medicine is consulted for evaluation of possible other sites of infection in setting of ongoing fevers, transaminitis.      Recommendations:   - Agree with ongoing fluconazole, unasyn per ID  - If worsening fevers, agree with obtaining repeat CT of neck, chest. Could consider ruling out PE as well, though low suspicion clinically at this time.  - Recommend repeat LFT monitoring on 5/19. Improving trend.  - Monitor platelets      Necrotizing Fasciitis of Neck with Mediastinal Involvement s/p Emergent VATS, Right Anterior Mediastinotomy, and I&D of Bilateral Parapharyngeal Spaces, Bilateral Neck, and Sublingual Space  Presented with necrotizing fasciitis of neck/mediastinum following a dental infection. Initial debridement on 5/4, with repeat oral and neck explorations on 5/13 due to persistent purulence and fevers. Polymicrobial infection growing Strep anginosus, Staph epidermidis, Prevotella, Candida dubliniensis. CRP downtrending.  - ENT primary  -Thoracic Surgery, Infectious Disease following   - Unasyn, fluconazole per ID recommendations  - Consider repeat CT of neck and chest pending fever curve. Consider r/o PE as well.  - Pending BC with NGTD   - Continue wound cares per ENT      Transaminitis   Slightly elevated Alk phos, AST, ALT on 5/15. Uptrending from 5/9. Previous liver studies WNLs on 5/3. Hepatitis  serologies negative, HIV non-reactive. GGT slightly elevated. Patient w/o RUQ abdominal pain, only endorses some discomfort at PEG tube site. PEG site does not appear acutely infected. Unclear etiology, initially suspected related to unasyn use however improving despite ongoing use.  - Repeat CMP on 5/19  - If develops RUQ pain, would obtain abdominal US or further imaging of abdomen     Poor Dentition s/p Extraction of Teeth #19, #20  Patient with poor dentition and large areas of decay. Patient recently treated for dental infection prior to presentation at [facility].   - OMFS following remotely   - Dental recommending extraction of multiple teeth secondary to caries and periapical abscesses     Severe Protein-Calorie Malnutrition   Moderate-severe oropharyngeal dysphagia  S/p PEG placement  Per chart review, patient with significant weight loss in weeks prior to admission. Underwent SLP evaluation today.  -Nutrition, SLP following  - TF per RD      Thrombocytosis  Plts 516 this AM. WNL at baseline. Suspect somewhat reactive process.  - Recommend monitoring with daily CBC with platelets      Acute Hypoxic Respiratory Failure, resolved   Intubated 5/3 - 5/9, currently on RA. Respiratory failure 2/2 upper airway obstruction from above infections with pulmonary edema contributing. Received diuresis while in ICU     Acute Blood Loss Anemia, resolved   Has not required blood transfusions. Hgb overall stable.  -CBC daily      Methamphetamine Use  Patient notes intermittent use, declines inpatient chemical dependency/Addiction medicine consultations at this time. Readdressed and remains non-interested in addiction medicine.      The patient's care was discussed with the Attending Physician, Dr. Peraza, Patient and ENT Team via this note.    Anali Hall PA-C  Hospitalist Service, GOLD TEAM [address] [place] [address]  Securely message with the Vocera Web Console  (learn more here)  Text page via [facility] Paging/Directory   Please see signed in provider for up to date coverage information  ______________________________________________________________________    Interval History   No acute events overnight. Doing well today. Looking forward to SLP evaluation. Pain adequately controlled.    4 point ROS is otherwise negative.    Data reviewed today: I reviewed all medications, new labs and imaging results over the last 24 hours. I personally reviewed no images or EKG's today   .    Physical Exam   Vital Signs: Temp: 99.1  F (37.3  C) Temp src: Oral BP: 120/71 Pulse: 97   Resp: 16 SpO2: 98 % O2 Device: None (Room air)    Weight: 216 lbs 14.92 oz    General Appearance:  Awake. Alert and oriented x4. Sitting up in bed. No acute distress.  HEENT: Normocephalic.Wound vac in place. Mucous membranes moist.  Respiratory: Normal work of breathing on room air. Lungs CTAB. No wheezes.  Cardiovascular: RRR. S1, S2. No murmurs. Pedal pulses 2+ No lower extremity edema.  GI: Abdomen non-distended. Normoactive. PEG in place. Soft, non-tender abdomen.  Skin: Warm, dry. No rashes on exposed skin.  Neuro: No focal deficits. CN II-XII grossly intact.        Data   Recent Labs   Lab 05/18/22  1148 05/18/22  0808 05/18/22  0635 05/17/22  1220 05/17/22  0936 05/17/22  0805 05/16/22  1211 05/16/22  0906 05/15/22  1153 05/15/22  0808   WBC  --   --  6.6  --  7.5 8.1  --  6.9  --  9.7   HGB  --   --  9.9*  --  10.5* 6.9*  --  10.0*  --  10.5*   MCV  --   --  95  --  94 95  --  96  --  96   PLT  --   --  494*  --  516* 600*  --  402  --  447   INR  --   --   --   --   --  1.13  --   --   --   --    NA  --   --  138  --   --  136  --  137  --  135  135   POTASSIUM  --   --  3.7  --   --  3.8  --  3.8  --  4.0  4.0   CHLORIDE  --   --  103  --   --  104  --  104  --  104  104   CO2  --   --  26  --   --  24  --  24  --  22  22   BUN  --   --  17  --   --  14  --  14  --  14  14   CR  --   --  0.77  --    --  0.78  --  0.77  --  0.68  0.76   ANIONGAP  --   --  9  --   --  8  --  9  --  9  9   OLYA  --   --  9.5  --   --  8.9  --  9.0  --  8.7  8.7   * 111* 105*   < >  --  99   < > 94   < > 112*  112*   ALBUMIN  --   --   --   --   --  2.5*  --   --   --  2.4*   PROTTOTAL  --   --   --   --   --  8.7  --   --   --  8.2   BILITOTAL  --   --   --   --   --  0.4  --   --   --  0.5   ALKPHOS  --   --   --   --   --  157*  --   --   --  176*   ALT  --   --   --   --   --  110*  --   --   --  130*   AST  --   --   --   --   --  44  --   --   --  59*    < > = values in this interval not displayed.     Recent Results (from the past 24 hour(s))   XR Video Swallow with SLP or OT    Narrative    EXAMINATION:  Modified Barium Swallow Study with Speech Pathology on  5/18/2022 2:23 PM.    INDICATION: Neck and mediastinal necrotizing fasciitis     COMPARISON: Fluoroscopic swallow study 5/12/2022    TECHNIQUE: Real time fluoroscopic acquisitions of the oropharynx and  upper esophagus were obtained after the oral administration of  multiple barium consistencies.    Total fluoroscopy time: 2.4 minutes    FINDINGS:   The patient swallows without difficulty. Pharyngeal motility is  normal.   On thin liquid consistency penetration and aspiration is visualized.  On mildly thickened liquid and puree consistencies there is  penetration without aspiration. No penetration or aspiration is  visualized with moderately thick liquid or cracker consistency.   The esophagus demonstrates normal motility.       Impression    IMPRESSION: Penetration and aspiration with thin liquid consistency.  Penetration also visualized with mildly thickened liquid and puree  consistencies.    Please see the speech pathologist report for further details regarding  the modified barium swallow study portion of the examination.    I, KRISTOPHER TREADWELL MD, attest that I was immediately available to  provide guidance and assistance during the entirety of the  procedure.      I have personally reviewed the examination and initial interpretation  and I agree with the findings.    KRISTOPHER TREADWELL MD         SYSTEM ID:  [id number]

## 2022-05-18 NOTE — PROGRESS NOTES
Brief ENT note  5/18/2022    Wound vac applied. Lateral aspect of the wound closed in the neck. The skin of the superior flap has started to invert as the wound closes in dimension. The edges were reapproximated with the cutaneous and deep closure. Within the wound there are TWO pieces of sponge. There is a superficial layer, and a central layer deeper. He tolerated the suturing and application well. The chest was redressed. This AM I applied silver nitrate to the wound to help with hemostasis.     Everardo Hunter MD  ENT resident

## 2022-05-18 NOTE — PROGRESS NOTES
General ID [facility]: Follow-up Note      Patient:  Robert Gallo, Date of birth 1995, Medical record number [id number]  Date of Visit:  May 9, 2022  Reason for consult: necrotizing neck infection         Assessment and Recommendations:     ID Problem list:  1. Necrotizing soft tissue infection of neck and mediastinum  2. S/p neck and mediastinum debridement 5/4/22 and 5/13 by ENT and thoracic surgery  3. Polymicrobial infection with prelim Strep anginosus, Staph epidermidis, Prevotella, Carlyn dubliniensis  4. Dental caries, s/p tooth extractions 5/4  5. Methamphetamine use, inhaled    Recs:  1.  Continue PO fluconazole and IV Unasyn while inpatient. May switch Unasyn to Augmentin at time of discharge. Continue Augmentin for at least 2 weeks from last procedure (5/13 - 5/27).  2.  Continue monitoring inflammatory markers and fever curve while inpatient.  3.  Would need ID clinic follow up around 5/27 and repeat imaging of the neck and chest prior to stopping antibiotics.    Discussion:  Robert is a 26M with PMH including dental caries who was admitted 5/4 due to necrotizing soft tissue infection of the neck and mediastinum, now s/p debridement by ENT and thoracic surgery 5/4 and s/p VATS/chest tubes and PEG and also extraction of multiple teeth. His OR cultures have grown polymicrobial bacteria thus far including Strep anginosus, Staph epidermidis, and Prevotella, and candida. Currently on Unasyn and fluconazole. Repeat I&D on 5/13 and improving fever curve afterwards.    Will sign off at this time, please call us back if still admitted or if issues arise.    Yg Douglas MD  Infectious Disease Fellow    ID Staff Assessment and Plan:   Patient was seen and examined by me with the ID Fellow. The note above reflects our joint assessment and recommendations. I have reviewed the medical record, labs, imaging, vitals signs and have discussed the patient with the ID fellow in detail. In summary the  patient continues to improve and he is now afebrile and CRP coming down and WBC improved to normal range. Neck wound partially closed by ENT center neck wound packed and dressed.   Patient will need ID and ENT clinic follow-up after discharge. Continue IV unasyn until patient ready for discharge, then transition to oral Augmentin 875 mg PO BID X 2 weeks with follow-up in the ID clinic 1-2 weeks after discharge.     Carla Arevalo MD  ID staff physician  Pager [phone]           Interval History:     24h events reviewed. Stable and feeling improved. Still has issues with swallowing but otherwise stable.         Review of Systems:     6 point ROS negative unless noted above         Current Antimicrobials     Unasyn  Fluconazole         Physical Exam:   Ranges for vital signs:  Temp:  [97.4  F (36.3  C)-99.5  F (37.5  C)] 99.1  F (37.3  C)  Pulse:  [85-97] 97  Resp:  [16-20] 16  BP: (115-124)/(59-71) 120/71  SpO2:  [96 %-100 %] 98 %    Exam:  GENERAL:  Awake and alert, pleasant  ENT/NECK:  Head is normocephalic, atraumatic. Serous discharge on neck dressing  LUNGS:  Breathing comfortably on room air. Basilar crackles resolved  CARDIOVASCULAR:  Regular rate and rhythm. No murmurs.   ABDOMEN:  Non-distended, soft, nontender.  NEUROLOGIC:  Awake and orientated  LINES: PIVs without erythema or drainage         Laboratory Data:       Inflammatory Markers    Recent Labs   Lab Test 05/18/22  0635 05/17/22  0805 05/16/22  0906 05/15/22  0808 05/14/22  0703 05/13/22  0849 05/12/22  0418 05/11/22  0345   SED 91* 114* 96* 82* 74* 81* 91* 98*   CRP 52.0* 67.0* 77.0* 120.0* 130.0* 100.0* 120.0* 150.0*       Hematology Studies    Recent Labs   Lab Test 05/18/22  0635 05/17/22  0936 05/17/22  0805 05/16/22  0906 05/15/22  0808 05/14/22  0703   WBC 6.6 7.5 8.1 6.9 9.7 16.4*   ANEU  --  5.9  --   --   --   --    AEOS  --  0.1  --   --   --   --    HGB 9.9* 10.5* 6.9* 10.0* 10.5* 10.9*   MCV 95 94 95 96 96 93   * 516* 600* 402 447 313        Metabolic Studies     Recent Labs   Lab Test 05/18/22  0635 05/17/22  0805 05/16/22  0906 05/15/22  0808 05/14/22  0703    136 137 135  135 137   POTASSIUM 3.7 3.8 3.8 4.0  4.0 4.0   CHLORIDE 103 104 104 104  104 106   CO2 26 24 24 22  22 23   BUN 17 14 14 14  14 16   CR 0.77 0.78 0.77 0.68  0.76 0.84   GFRESTIMATED >90 >90 >90 >90  >90 >90       Hepatic Studies    Recent Labs   Lab Test 05/17/22  0805 05/15/22  0808 05/09/22  0730 05/08/22  0456 05/04/22  0557   BILITOTAL 0.4 0.5 0.6 0.6 1.5*   ALKPHOS 157* 176* 164* 185* 93   ALBUMIN 2.5* 2.4* 1.9* 1.8* 2.0*   AST 44 59* 75* 120* 32   * 130* 110* 126* 26       Microbiology:  Culture   Date Value Ref Range Status   05/17/2022 No growth after 1 day  Preliminary   05/17/2022 No growth after 1 day  Preliminary   05/16/2022 No growth after 2 days  Preliminary   05/16/2022 No growth after 2 days  Preliminary   05/15/2022 No growth after 3 days  Preliminary   05/15/2022 No growth after 3 days  Preliminary   05/09/2022 No Growth  Final   05/09/2022 No Growth  Final   05/08/2022 No Growth  Final   05/08/2022 No Growth  Final   05/06/2022 2+ Mixed Aerobic and Anaerobic roslyn  Final   05/06/2022 1+ Normal roslyn  Final   05/06/2022 4+ Carlyn dubliniensis (A)  Final     Comment:     Susceptibilities not routinely done   05/06/2022 3+ Calryn dubliniensis (A)  Final   05/06/2022 1+ Carlyn dubliniensis (A)  Final     Comment:     Susceptibilities not routinely done   05/04/2022 No Growth  Final   05/04/2022 No Growth  Final   05/04/2022 1+ Prevotella species (A)  Final     Comment:     Susceptibilities not routinely done   05/04/2022 2+ Mixed Aerobic and Anaerobic roslyn  Final   05/04/2022 Carlyn dubliniensis (A)  Preliminary   05/04/2022 1+ Streptococcus anginosus (A)  Final     Comment:     This organism is susceptible to ampicillin, penicillin, vancomycin and the cephalosporins. If treatment is required and your patient is allergic to penicillin,  contact the microbiology lab within 5 days to request susceptibility testing.   05/04/2022 1+ Normal roslyn  Final   05/04/2022 2+ Prevotella species (A)  Final     Comment:     Susceptibilities not routinely done   05/04/2022 2+ Prevotella species (A)  Final     Comment:     Susceptibilities not routinely done   05/04/2022 4+ Mixed Aerobic and Anaerobic roslyn  Final   05/04/2022 No growth after 14 days  Preliminary   05/04/2022 1+ Streptococcus anginosus (A)  Final     Comment:     This organism is susceptible to ampicillin, penicillin, vancomycin and the cephalosporins. If treatment is required and your patient is allergic to penicillin, contact the microbiology lab within 5 days to request susceptibility testing.   05/04/2022 1+ Staphylococcus epidermidis (A)  Corrected

## 2022-05-18 NOTE — PLAN OF CARE
Status: Admitted 5/4 for management of necrotizing fasciitis of neck/mediastinum, required emergent bilateral VATS (chest tubes out ) R anterior mediastinotomy, I&D of pharyngeal abscess, and PEG tube placement.    Vitals: VSS  Neuros: A/Ox4, decreased sensation L side of face   IV: PIV SL   Resp/trach: Frequent cough, oral suctioning independently   Diet: NPO, 7/7 cans yesterday  Bowel status: LBM 5/17  : voiding independently   Skin: R chest tube site covered with primapore. L chest tube site HIRAM. Midline neck/chest wound packed and dressed by ENT; mild/moderate amount of blood this AM oozing through dressing, reinforced with ABD and ENT MD on the floor aware. R chest wound packed and dressed by evening RN; no bleeding or strikethrough on primapore dressings.  Pain: Neck discomfort, gave flexeril x1   Activity: independent  Plan: Will need additional teeth extracted per dentistry, continue plan of care

## 2022-05-18 NOTE — PLAN OF CARE
Status: Here for necrotizing soft tissue infection. 5-4 Thoracotomy, GTube and I & D of pharyngeal abscess. 5-13 exploration of neck . Video swallow this afternoon.  VS: T 99.5 this AM.   GI: Has had 3.5/7 cans of feeding this shift.Loose Bm this shift.  : Voiding spontaneously.   Respiratory: WNL. Strong cough. Oral secretions, self suctions.   Skin: L side of neck incision  sutured partially and wound vac placed. R chest dressing changed by MD's R Chest tube site dressing changed and PEG site dressing changed, by nursing.    IV: PIV SL'd between antibiotics.   Activity: Up independently in room.   Pain: Tylenol for pain.  Plan: Continue with cares as ordered.

## 2022-05-18 NOTE — PLAN OF CARE
Status: Admitted 5/4 with necrotizing soft tissue infection of the neck. S/p I&D of bilateral parapharyngeal spaces, sublingual space, bilateral necks, and extraction of teeth 5/4. Repeat OR 5/13 for I&D  Vitals: VSS on RA  Neuros: A&Ox4, decreased sensation to L side of face  IV: PIV SL between antibiotics  Labs/Electrolytes: WNL. BG checks  Resp/trach: Frequent cough, uses oral suction independently   Diet: NPO, goal of 7 cans/day. Had 7/7 today   Bowel status: Soft Bms today  : Voiding  Skin: R chest tube site covered with primapore, L chest tube site HIRAM. Midline chest wound repacked this afternoon by ENT. R chest wound bleeding profusely after dressing change. Bled threw new dressing after 30 minutes, replaced, thoracic MD here to assess. Bleeding has since stopped, quik clot available in needed. PEG site WNL  Pain: Neck discomfort with movement, PRN tylenol/atarax effective  Activity: Independent  Plan: Will likely need addtl teeth extracted during this admission per dental resident. Continue POC

## 2022-05-19 ENCOUNTER — APPOINTMENT (OUTPATIENT)
Dept: SPEECH THERAPY | Facility: CLINIC | Age: 27
End: 2022-05-19
Payer: COMMERCIAL

## 2022-05-19 ENCOUNTER — APPOINTMENT (OUTPATIENT)
Dept: OCCUPATIONAL THERAPY | Facility: CLINIC | Age: 27
End: 2022-05-19
Payer: COMMERCIAL

## 2022-05-19 LAB
ALBUMIN SERPL-MCNC: 2.5 G/DL (ref 3.4–5)
ALP SERPL-CCNC: 142 U/L (ref 40–150)
ALT SERPL W P-5'-P-CCNC: 102 U/L (ref 0–70)
ANION GAP SERPL CALCULATED.3IONS-SCNC: 10 MMOL/L (ref 3–14)
AST SERPL W P-5'-P-CCNC: 62 U/L (ref 0–45)
BILIRUB SERPL-MCNC: 0.6 MG/DL (ref 0.2–1.3)
BUN SERPL-MCNC: 16 MG/DL (ref 7–30)
CALCIUM SERPL-MCNC: 9 MG/DL (ref 8.5–10.1)
CHLORIDE BLD-SCNC: 105 MMOL/L (ref 94–109)
CO2 SERPL-SCNC: 26 MMOL/L (ref 20–32)
CREAT SERPL-MCNC: 0.78 MG/DL (ref 0.66–1.25)
CRP SERPL-MCNC: 44 MG/L (ref 0–8)
ERYTHROCYTE [DISTWIDTH] IN BLOOD BY AUTOMATED COUNT: 13.8 % (ref 10–15)
ERYTHROCYTE [SEDIMENTATION RATE] IN BLOOD BY WESTERGREN METHOD: 98 MM/HR (ref 0–15)
GFR SERPL CREATININE-BSD FRML MDRD: >90 ML/MIN/1.73M2
GLUCOSE BLD-MCNC: 96 MG/DL (ref 70–99)
GLUCOSE BLDC GLUCOMTR-MCNC: 102 MG/DL (ref 70–99)
GLUCOSE BLDC GLUCOMTR-MCNC: 119 MG/DL (ref 70–99)
GLUCOSE BLDC GLUCOMTR-MCNC: 158 MG/DL (ref 70–99)
GLUCOSE BLDC GLUCOMTR-MCNC: 87 MG/DL (ref 70–99)
GLUCOSE BLDC GLUCOMTR-MCNC: 98 MG/DL (ref 70–99)
GLUCOSE BLDC GLUCOMTR-MCNC: 99 MG/DL (ref 70–99)
HCT VFR BLD AUTO: 30.8 % (ref 40–53)
HGB BLD-MCNC: 9.5 G/DL (ref 13.3–17.7)
MAGNESIUM SERPL-MCNC: 2.2 MG/DL (ref 1.6–2.3)
MCH RBC QN AUTO: 29.3 PG (ref 26.5–33)
MCHC RBC AUTO-ENTMCNC: 30.8 G/DL (ref 31.5–36.5)
MCV RBC AUTO: 95 FL (ref 78–100)
PHOSPHATE SERPL-MCNC: 4.7 MG/DL (ref 2.5–4.5)
PLATELET # BLD AUTO: 482 10E3/UL (ref 150–450)
POTASSIUM BLD-SCNC: 3.8 MMOL/L (ref 3.4–5.3)
PROT SERPL-MCNC: 8.6 G/DL (ref 6.8–8.8)
RBC # BLD AUTO: 3.24 10E6/UL (ref 4.4–5.9)
SODIUM SERPL-SCNC: 141 MMOL/L (ref 133–144)
WBC # BLD AUTO: 5 10E3/UL (ref 4–11)

## 2022-05-19 PROCEDURE — 86140 C-REACTIVE PROTEIN: CPT | Performed by: STUDENT IN AN ORGANIZED HEALTH CARE EDUCATION/TRAINING PROGRAM

## 2022-05-19 PROCEDURE — 250N000009 HC RX 250

## 2022-05-19 PROCEDURE — 83735 ASSAY OF MAGNESIUM: CPT | Performed by: STUDENT IN AN ORGANIZED HEALTH CARE EDUCATION/TRAINING PROGRAM

## 2022-05-19 PROCEDURE — 97110 THERAPEUTIC EXERCISES: CPT | Mod: GO | Performed by: OCCUPATIONAL THERAPIST

## 2022-05-19 PROCEDURE — 999N000147 HC STATISTIC PT IP EVAL DEFER: Performed by: PHYSICAL THERAPIST

## 2022-05-19 PROCEDURE — 97535 SELF CARE MNGMENT TRAINING: CPT | Mod: GO | Performed by: OCCUPATIONAL THERAPIST

## 2022-05-19 PROCEDURE — 99207 PR APP CREDIT; MD BILLING SHARED VISIT: CPT | Performed by: PHYSICIAN ASSISTANT

## 2022-05-19 PROCEDURE — 97530 THERAPEUTIC ACTIVITIES: CPT | Mod: GO | Performed by: OCCUPATIONAL THERAPIST

## 2022-05-19 PROCEDURE — 250N000013 HC RX MED GY IP 250 OP 250 PS 637: Performed by: PHYSICIAN ASSISTANT

## 2022-05-19 PROCEDURE — 92526 ORAL FUNCTION THERAPY: CPT | Mod: GN

## 2022-05-19 PROCEDURE — 250N000011 HC RX IP 250 OP 636: Performed by: STUDENT IN AN ORGANIZED HEALTH CARE EDUCATION/TRAINING PROGRAM

## 2022-05-19 PROCEDURE — 120N000002 HC R&B MED SURG/OB UMMC

## 2022-05-19 PROCEDURE — 250N000013 HC RX MED GY IP 250 OP 250 PS 637

## 2022-05-19 PROCEDURE — 85027 COMPLETE CBC AUTOMATED: CPT | Performed by: STUDENT IN AN ORGANIZED HEALTH CARE EDUCATION/TRAINING PROGRAM

## 2022-05-19 PROCEDURE — 84100 ASSAY OF PHOSPHORUS: CPT | Performed by: STUDENT IN AN ORGANIZED HEALTH CARE EDUCATION/TRAINING PROGRAM

## 2022-05-19 PROCEDURE — 80053 COMPREHEN METABOLIC PANEL: CPT | Performed by: PHYSICIAN ASSISTANT

## 2022-05-19 PROCEDURE — 99232 SBSQ HOSP IP/OBS MODERATE 35: CPT | Performed by: ORTHOPAEDIC SURGERY

## 2022-05-19 PROCEDURE — 85652 RBC SED RATE AUTOMATED: CPT | Performed by: STUDENT IN AN ORGANIZED HEALTH CARE EDUCATION/TRAINING PROGRAM

## 2022-05-19 PROCEDURE — 250N000013 HC RX MED GY IP 250 OP 250 PS 637: Performed by: STUDENT IN AN ORGANIZED HEALTH CARE EDUCATION/TRAINING PROGRAM

## 2022-05-19 PROCEDURE — 36415 COLL VENOUS BLD VENIPUNCTURE: CPT | Performed by: STUDENT IN AN ORGANIZED HEALTH CARE EDUCATION/TRAINING PROGRAM

## 2022-05-19 RX ORDER — LIDOCAINE HYDROCHLORIDE AND EPINEPHRINE 10; 10 MG/ML; UG/ML
20 INJECTION, SOLUTION INFILTRATION; PERINEURAL ONCE
Status: COMPLETED | OUTPATIENT
Start: 2022-05-19 | End: 2022-05-19

## 2022-05-19 RX ORDER — DIPHENHYDRAMINE HCL 12.5MG/5ML
25 LIQUID (ML) ORAL
Status: DISCONTINUED | OUTPATIENT
Start: 2022-05-19 | End: 2022-05-26 | Stop reason: HOSPADM

## 2022-05-19 RX ADMIN — Medication 1 PACKET: at 12:35

## 2022-05-19 RX ADMIN — ACETAMINOPHEN 325MG 650 MG: 325 TABLET ORAL at 09:10

## 2022-05-19 RX ADMIN — CYCLOBENZAPRINE HYDROCHLORIDE 5 MG: 5 TABLET, FILM COATED ORAL at 09:11

## 2022-05-19 RX ADMIN — AMPICILLIN SODIUM AND SULBACTAM SODIUM 3 G: 2; 1 INJECTION, POWDER, FOR SOLUTION INTRAMUSCULAR; INTRAVENOUS at 22:39

## 2022-05-19 RX ADMIN — Medication 2 PACKET: at 09:03

## 2022-05-19 RX ADMIN — CYCLOBENZAPRINE HYDROCHLORIDE 5 MG: 5 TABLET, FILM COATED ORAL at 17:10

## 2022-05-19 RX ADMIN — FLUCONAZOLE 400 MG: 200 TABLET ORAL at 12:04

## 2022-05-19 RX ADMIN — AMPICILLIN SODIUM AND SULBACTAM SODIUM 3 G: 2; 1 INJECTION, POWDER, FOR SOLUTION INTRAMUSCULAR; INTRAVENOUS at 04:00

## 2022-05-19 RX ADMIN — ACETAMINOPHEN 325MG 650 MG: 325 TABLET ORAL at 15:14

## 2022-05-19 RX ADMIN — ACETAMINOPHEN 325MG 650 MG: 325 TABLET ORAL at 00:45

## 2022-05-19 RX ADMIN — Medication 1 PACKET: at 20:16

## 2022-05-19 RX ADMIN — APIXABAN 10 MG: 5 TABLET, FILM COATED ORAL at 20:11

## 2022-05-19 RX ADMIN — SENNOSIDES AND DOCUSATE SODIUM 2 TABLET: 8.6; 5 TABLET ORAL at 20:11

## 2022-05-19 RX ADMIN — CYCLOBENZAPRINE HYDROCHLORIDE 5 MG: 5 TABLET, FILM COATED ORAL at 00:45

## 2022-05-19 RX ADMIN — LIDOCAINE HYDROCHLORIDE AND EPINEPHRINE 20 ML: 10; 10 INJECTION, SOLUTION INFILTRATION; PERINEURAL at 12:04

## 2022-05-19 RX ADMIN — DIPHENHYDRAMINE HYDROCHLORIDE 25 MG: 25 SOLUTION ORAL at 22:39

## 2022-05-19 RX ADMIN — APIXABAN 10 MG: 5 TABLET, FILM COATED ORAL at 09:00

## 2022-05-19 RX ADMIN — Medication 1 PACKET: at 15:41

## 2022-05-19 RX ADMIN — AMPICILLIN SODIUM AND SULBACTAM SODIUM 3 G: 2; 1 INJECTION, POWDER, FOR SOLUTION INTRAMUSCULAR; INTRAVENOUS at 17:10

## 2022-05-19 RX ADMIN — HYOSCYAMINE SULFATE: 16 SOLUTION at 08:00

## 2022-05-19 RX ADMIN — HYDROXYZINE HYDROCHLORIDE 25 MG: 10 SOLUTION ORAL at 00:45

## 2022-05-19 RX ADMIN — Medication 2 PACKET: at 20:16

## 2022-05-19 RX ADMIN — Medication 1 PACKET: at 09:04

## 2022-05-19 RX ADMIN — AMPICILLIN SODIUM AND SULBACTAM SODIUM 3 G: 2; 1 INJECTION, POWDER, FOR SOLUTION INTRAMUSCULAR; INTRAVENOUS at 11:00

## 2022-05-19 RX ADMIN — INSULIN ASPART 1 UNITS: 100 INJECTION, SOLUTION INTRAVENOUS; SUBCUTANEOUS at 12:34

## 2022-05-19 RX ADMIN — ACETAMINOPHEN 325MG 650 MG: 325 TABLET ORAL at 20:12

## 2022-05-19 ASSESSMENT — ACTIVITIES OF DAILY LIVING (ADL)
ADLS_ACUITY_SCORE: 22
ADLS_ACUITY_SCORE: 22
ADLS_ACUITY_SCORE: 20
ADLS_ACUITY_SCORE: 22
ADLS_ACUITY_SCORE: 20
ADLS_ACUITY_SCORE: 22
ADLS_ACUITY_SCORE: 20
ADLS_ACUITY_SCORE: 22
ADLS_ACUITY_SCORE: 22
ADLS_ACUITY_SCORE: 20

## 2022-05-19 NOTE — PLAN OF CARE
Status: Admitted 5/4 for necrotizing soft tissue infection. 5/4 thoracotomy, Gtube and I&D of pharyngeal absecess. 5/13 exploration of neck.   Vitals: VSS afebrile this shift  Neuros: A&Ox4  IV: PIV TKO between abx.  Labs/Electrolytes: COVID negative. Hgb 8.8  Resp/trach: WNL, Strong productive cough. Self suctions oral secretions. Pink tinged, pt reports this has been happening off and on since he got teeth pulled, and since starting blood thinners.   Diet: Full liquid. No PO this shift. Pt states he feels like he aspirates, therefore does not want to try.  Bolus TF, received 7/7 cans yesterday.  FWF of 200mL q4h.  Bowel status: Loose BM 5/18, hold bowel meds.   : Voiding spont.  Skin: L side of neck incision, wound vac removed this last evening. Dressing changed due to complete saturation. R chest and PEG site CDI.   Pain: Tylenol and flexeril for pain, atarax x1 for mild anxiety.    Activity: Up ind in room  Plan: formal plan once fevers have resolved. Continue Lovenox for internal jugular thrombus.

## 2022-05-19 NOTE — PROGRESS NOTES
Brief ENT Note    Neck closure procedure :   The anterior neck wound was prepped circumferentially with alcohol swabs. 6CCs of 1% lidocaine with 1:100,000 epinephrine were infiltrated around the wound for local anesthesia. Five minutes were waited for this to take effect. A penrose drain was sutured into the wound and placed down to the deepest point within the wound cavity. The wound was then closed with five horizontal mattress sutures around the drain. The patient tolerated the procedure well without evidence of complication.     Marcelo Blackburn MD  Otolaryngology Head and Neck Surgery, PGY-1

## 2022-05-19 NOTE — PLAN OF CARE
Status: Admitted 5/4 for necrotizing soft tissue infection. 5/4 thoracotomy, Gtube and I&D of pharyngeal absecess. 5/13 exploration of neck. Video swallow completed prior shift.   Vitals: T 99.1 this shift.   Neuros: A&Ox4  IV: PIV SL between abx.  Resp/trach: WNL, Strong productive cough. Self suctions oral secretions.   Diet: Advanced to full liquid. Patient did not tolerate much PO this shift. Received 7/7 cans   Bowel status: Loose BM 5/18, Bowel meds held.   : Voiding spont.  Skin: L side of neck incision, wound vac removed this shift. Dressing changed due to complete saturation. R chest dressing changed this shift as well due to bleeding. MD stitched midline chest wound to stop bleeding. Chest tube site changed this shift as well. Peg site dressing CDI  Pain: Tylenol and flexeril for pain.   Activity: Up ind in room  Plan: Continue to follow current POC.

## 2022-05-19 NOTE — PROGRESS NOTES
ENT progress note  5/19/2022    S: doing well    E: General: appears well  HEENT: healthy appearing neck wound. Lightly packed this am.  Chest: no bleeding this am    A/P:  Mr Gallo is doing well after an extended hospitalization related to an odontogenic necrotizing infection.     Will return to place a penrose in the neck. This will allow for fewer dressing changes and he would likely be able to manage outpatient with ointment and a gauze to collect drainage. Discussed possible discharge in the near future. Patient has some reservations. His clinical history is improving.     Everardo Hunter MD  ENT resident

## 2022-05-19 NOTE — PLAN OF CARE
Goal Outcome Evaluation:             Status: Admitted 5/4 for necrotizing soft tissue infection. 5/4 thoracotomy, Gtube and I&D of pharyngeal absecess. 5/13 exploration of neck.   Vitals: VSS afebrile this shift  Neuros: A&Ox4  IV: PIV TKO between abx.  Labs/Electrolytes: COVID negative. Hgb 9.5  Resp: WNL except pt spits pink tinged,sputum pt reports this has been happening off and on since he got teeth pulled, and since starting blood thinners.   Diet: Full liquid moderately thickened( level 3) after pt evaluated by speech pathologist. Pt is swallowing without aspirating with the current diet. Bolus TF x1( 2 cartoon). Pt declined lunch TF bolus stating that he will let nursing know when he is ready.Free water 200 ml given per order. Meds crushed and given via PEG.  Bowel status: Loose BM x1 today per pt report; hold bowel meds.   : Voiding spont.  Skin: Penrose placed today per  MD on neck wound; Dressing change PRN per Md order.Mid chest and PEG site CDI.   Pain: Tylenol and flexeril for pain for neck pain with decrease in pain.  Activity: Up ind in room  Pt started practicing using PEG per MD request; pt needs to continue to practice.  Plan: Continue with POC.                Revision History

## 2022-05-19 NOTE — PLAN OF CARE
Orders received for additional PT evaluation for neck ROM exercises.  Evaluation cx and deferred.  OT currently following patient for ADLs and endurance.  OT initiated and will follow for neck ROM exercises as well.  No further acute PT intervention indicated.  Will complete PT order.

## 2022-05-20 ENCOUNTER — APPOINTMENT (OUTPATIENT)
Dept: SPEECH THERAPY | Facility: CLINIC | Age: 27
End: 2022-05-20
Payer: COMMERCIAL

## 2022-05-20 ENCOUNTER — TELEPHONE (OUTPATIENT)
Dept: INFECTIOUS DISEASES | Facility: CLINIC | Age: 27
End: 2022-05-20
Payer: COMMERCIAL

## 2022-05-20 LAB
ANION GAP SERPL CALCULATED.3IONS-SCNC: 7 MMOL/L (ref 3–14)
BACTERIA BLD CULT: NO GROWTH
BACTERIA BLD CULT: NO GROWTH
BUN SERPL-MCNC: 17 MG/DL (ref 7–30)
CALCIUM SERPL-MCNC: 9.6 MG/DL (ref 8.5–10.1)
CHLORIDE BLD-SCNC: 102 MMOL/L (ref 94–109)
CO2 SERPL-SCNC: 26 MMOL/L (ref 20–32)
CREAT SERPL-MCNC: 0.8 MG/DL (ref 0.66–1.25)
CRP SERPL-MCNC: 34 MG/L (ref 0–8)
ERYTHROCYTE [DISTWIDTH] IN BLOOD BY AUTOMATED COUNT: 13.8 % (ref 10–15)
ERYTHROCYTE [SEDIMENTATION RATE] IN BLOOD BY WESTERGREN METHOD: 99 MM/HR (ref 0–15)
FERRITIN SERPL-MCNC: 402 NG/ML (ref 26–388)
GFR SERPL CREATININE-BSD FRML MDRD: >90 ML/MIN/1.73M2
GLUCOSE BLD-MCNC: 86 MG/DL (ref 70–99)
GLUCOSE BLDC GLUCOMTR-MCNC: 145 MG/DL (ref 70–99)
GLUCOSE BLDC GLUCOMTR-MCNC: 154 MG/DL (ref 70–99)
GLUCOSE BLDC GLUCOMTR-MCNC: 86 MG/DL (ref 70–99)
GLUCOSE BLDC GLUCOMTR-MCNC: 87 MG/DL (ref 70–99)
GLUCOSE BLDC GLUCOMTR-MCNC: 92 MG/DL (ref 70–99)
GLUCOSE BLDC GLUCOMTR-MCNC: 96 MG/DL (ref 70–99)
HCT VFR BLD AUTO: 34.7 % (ref 40–53)
HGB BLD-MCNC: 10.8 G/DL (ref 13.3–17.7)
IRON SATN MFR SERPL: 19 % (ref 15–46)
IRON SERPL-MCNC: 53 UG/DL (ref 35–180)
MAGNESIUM SERPL-MCNC: 2.3 MG/DL (ref 1.6–2.3)
MCH RBC QN AUTO: 29.6 PG (ref 26.5–33)
MCHC RBC AUTO-ENTMCNC: 31.1 G/DL (ref 31.5–36.5)
MCV RBC AUTO: 95 FL (ref 78–100)
PHOSPHATE SERPL-MCNC: 4.8 MG/DL (ref 2.5–4.5)
PLATELET # BLD AUTO: 534 10E3/UL (ref 150–450)
POTASSIUM BLD-SCNC: 3.9 MMOL/L (ref 3.4–5.3)
RBC # BLD AUTO: 3.65 10E6/UL (ref 4.4–5.9)
RETICS # AUTO: 0.15 10E6/UL (ref 0.03–0.1)
RETICS/RBC NFR AUTO: 4.1 % (ref 0.5–2)
SODIUM SERPL-SCNC: 135 MMOL/L (ref 133–144)
TIBC SERPL-MCNC: 283 UG/DL (ref 240–430)
WBC # BLD AUTO: 4.9 10E3/UL (ref 4–11)

## 2022-05-20 PROCEDURE — 250N000013 HC RX MED GY IP 250 OP 250 PS 637: Performed by: STUDENT IN AN ORGANIZED HEALTH CARE EDUCATION/TRAINING PROGRAM

## 2022-05-20 PROCEDURE — 250N000013 HC RX MED GY IP 250 OP 250 PS 637

## 2022-05-20 PROCEDURE — 86140 C-REACTIVE PROTEIN: CPT | Performed by: STUDENT IN AN ORGANIZED HEALTH CARE EDUCATION/TRAINING PROGRAM

## 2022-05-20 PROCEDURE — 83550 IRON BINDING TEST: CPT | Performed by: INTERNAL MEDICINE

## 2022-05-20 PROCEDURE — 85652 RBC SED RATE AUTOMATED: CPT | Performed by: STUDENT IN AN ORGANIZED HEALTH CARE EDUCATION/TRAINING PROGRAM

## 2022-05-20 PROCEDURE — 82310 ASSAY OF CALCIUM: CPT | Performed by: STUDENT IN AN ORGANIZED HEALTH CARE EDUCATION/TRAINING PROGRAM

## 2022-05-20 PROCEDURE — 83735 ASSAY OF MAGNESIUM: CPT | Performed by: STUDENT IN AN ORGANIZED HEALTH CARE EDUCATION/TRAINING PROGRAM

## 2022-05-20 PROCEDURE — 120N000002 HC R&B MED SURG/OB UMMC

## 2022-05-20 PROCEDURE — 82728 ASSAY OF FERRITIN: CPT | Performed by: INTERNAL MEDICINE

## 2022-05-20 PROCEDURE — 84100 ASSAY OF PHOSPHORUS: CPT | Performed by: STUDENT IN AN ORGANIZED HEALTH CARE EDUCATION/TRAINING PROGRAM

## 2022-05-20 PROCEDURE — 92526 ORAL FUNCTION THERAPY: CPT | Mod: GN | Performed by: SPEECH-LANGUAGE PATHOLOGIST

## 2022-05-20 PROCEDURE — 99222 1ST HOSP IP/OBS MODERATE 55: CPT | Performed by: INTERNAL MEDICINE

## 2022-05-20 PROCEDURE — 85027 COMPLETE CBC AUTOMATED: CPT | Performed by: STUDENT IN AN ORGANIZED HEALTH CARE EDUCATION/TRAINING PROGRAM

## 2022-05-20 PROCEDURE — 85045 AUTOMATED RETICULOCYTE COUNT: CPT | Performed by: INTERNAL MEDICINE

## 2022-05-20 PROCEDURE — 250N000011 HC RX IP 250 OP 636: Performed by: STUDENT IN AN ORGANIZED HEALTH CARE EDUCATION/TRAINING PROGRAM

## 2022-05-20 PROCEDURE — 36415 COLL VENOUS BLD VENIPUNCTURE: CPT | Performed by: STUDENT IN AN ORGANIZED HEALTH CARE EDUCATION/TRAINING PROGRAM

## 2022-05-20 RX ORDER — OXYMETAZOLINE HYDROCHLORIDE 0.05 G/100ML
10 SPRAY NASAL
Status: DISCONTINUED | OUTPATIENT
Start: 2022-05-20 | End: 2022-05-26 | Stop reason: HOSPADM

## 2022-05-20 RX ADMIN — Medication 1 PACKET: at 08:53

## 2022-05-20 RX ADMIN — INSULIN ASPART 1 UNITS: 100 INJECTION, SOLUTION INTRAVENOUS; SUBCUTANEOUS at 12:37

## 2022-05-20 RX ADMIN — INSULIN ASPART 1 UNITS: 100 INJECTION, SOLUTION INTRAVENOUS; SUBCUTANEOUS at 20:14

## 2022-05-20 RX ADMIN — Medication 1 PACKET: at 20:18

## 2022-05-20 RX ADMIN — HYDROXYZINE HYDROCHLORIDE 25 MG: 10 SOLUTION ORAL at 08:51

## 2022-05-20 RX ADMIN — Medication 1 PACKET: at 17:22

## 2022-05-20 RX ADMIN — AMPICILLIN SODIUM AND SULBACTAM SODIUM 3 G: 2; 1 INJECTION, POWDER, FOR SOLUTION INTRAMUSCULAR; INTRAVENOUS at 12:34

## 2022-05-20 RX ADMIN — AMPICILLIN SODIUM AND SULBACTAM SODIUM 3 G: 2; 1 INJECTION, POWDER, FOR SOLUTION INTRAMUSCULAR; INTRAVENOUS at 17:29

## 2022-05-20 RX ADMIN — HYDROXYZINE HYDROCHLORIDE 25 MG: 10 SOLUTION ORAL at 00:35

## 2022-05-20 RX ADMIN — HYDROXYZINE HYDROCHLORIDE 25 MG: 10 SOLUTION ORAL at 22:24

## 2022-05-20 RX ADMIN — CYCLOBENZAPRINE HYDROCHLORIDE 5 MG: 5 TABLET, FILM COATED ORAL at 23:53

## 2022-05-20 RX ADMIN — ACETAMINOPHEN 325MG 650 MG: 325 TABLET ORAL at 20:16

## 2022-05-20 RX ADMIN — ACETAMINOPHEN 325MG 650 MG: 325 TABLET ORAL at 00:50

## 2022-05-20 RX ADMIN — ACETAMINOPHEN 325MG 650 MG: 325 TABLET ORAL at 15:20

## 2022-05-20 RX ADMIN — Medication 2 PACKET: at 22:24

## 2022-05-20 RX ADMIN — SENNOSIDES AND DOCUSATE SODIUM 2 TABLET: 8.6; 5 TABLET ORAL at 20:16

## 2022-05-20 RX ADMIN — AMPICILLIN SODIUM AND SULBACTAM SODIUM 3 G: 2; 1 INJECTION, POWDER, FOR SOLUTION INTRAMUSCULAR; INTRAVENOUS at 05:50

## 2022-05-20 RX ADMIN — ACETAMINOPHEN 325MG 650 MG: 325 TABLET ORAL at 09:04

## 2022-05-20 RX ADMIN — Medication 1 PACKET: at 08:50

## 2022-05-20 RX ADMIN — SENNOSIDES AND DOCUSATE SODIUM 2 TABLET: 8.6; 5 TABLET ORAL at 09:04

## 2022-05-20 RX ADMIN — ACETAMINOPHEN 325MG 650 MG: 325 TABLET ORAL at 04:54

## 2022-05-20 RX ADMIN — Medication 1 PACKET: at 12:35

## 2022-05-20 RX ADMIN — CYCLOBENZAPRINE HYDROCHLORIDE 5 MG: 5 TABLET, FILM COATED ORAL at 15:20

## 2022-05-20 RX ADMIN — AMPICILLIN SODIUM AND SULBACTAM SODIUM 3 G: 2; 1 INJECTION, POWDER, FOR SOLUTION INTRAMUSCULAR; INTRAVENOUS at 23:53

## 2022-05-20 RX ADMIN — FLUCONAZOLE 400 MG: 200 TABLET ORAL at 08:48

## 2022-05-20 ASSESSMENT — ACTIVITIES OF DAILY LIVING (ADL)
ADLS_ACUITY_SCORE: 20

## 2022-05-20 NOTE — CONSULTS
Hematology Consult Note   Date of Service: 05/20/2022    Patient: Robert Gallo  MRN: [id number]  Admission Date: 5/4/2022  Hospital Day # 16       Reason for Consult: management of anticoagulation      History of Present Illness:      Robert Gallo is a 26 year old male with no significant past medical history who initially presented as transfer from [facility] for management of necrotizing fasciitis of neck with mediastinal involvement.  He had had poor dentition for a long time but did not seek any treatment due to lack of insurance. He developed progressive dysphagia 1 week prior to admission. It got worsen to the point that he could not tolerate anything oral. He did not see a dentist. He developed fever and neck pain. He went to ED and CT scan showed parapharyngeal abscess.  He is now s/p I&D of bilateral parapharyngeal spaces, sublingual space, bilateral neck, extraction of teeth and bilateral VATS and PEG tube placement (5/4). Returned to OR on 5/13 for paraesophageal fluid collection.  pt was evaluated by adult dentistry [facility] who recommended extraction of #1 2, 3, 5, 6, 7, 8, 9, 10, 11, 12, 14, 15, 16, 29 and 30. Currently the pts anticoagulation therapy has been held since 5/17/22. OMFS to ext 1, 2, 3, 5, 6, 7, 8, 9, 10, 11, 12, 14, 15, 16, 29, 30  with LA in [facility] on Monday 5/23/22 @8AM.   Hematology was consulted to comment on duration of anticoagulation therapy.    Patient appears to be in no acute distress. No active bleeding but slightly oozing from incision site. He has poor denotation.He is tolerating Full liquid diet. Thre is no noticeable swelling of any of extremities.    Review of Systems: Pertinent positive and negative systems described in HPI; the remainder of the 14 systems are negative    Past Medical History:  Past Medical History:   Diagnosis Date     No known problems        Past Surgical History:  Past Surgical History:   Procedure Laterality Date     EXPLORE NECK N/A 5/13/2022  "   Procedure: Oral Exploration, Neck Exploration;  Surgeon: Syd Vila MD;  Location: [facility] OR     IRRIGATION AND DEBRIDEMENT NECK, COMBINED N/A 5/4/2022    Procedure: INCISION AND DRAINAGE PARAPHARYNGEAL ABCESS ON RIGHT AND LEFT;  Surgeon: Milena Gore MD;  Location: [facility] OR     PICC DOUBLE LUMEN PLACEMENT Left 05/04/2022    Left basilic, 47 cm, 1 external length     THORACOSCOPY N/A 5/4/2022    Procedure: BILATERAL VIDEO ASSISTED THORACOTOMY. RIGHT ANTERIOR MEDIASTINOTYOMY. PEG TUBE.;  Surgeon: Abundio Patel MD;  Location: [facility] OR     THORACOSCOPY  5/4/2022    Procedure: ;  Surgeon: Abundio Patel MD;  Location: [facility] OR       Social History:  Social History     Socioeconomic History     Marital status: Single     Spouse name: None     Number of children: None     Years of education: None     Highest education level: None        Family History  Family History   Problem Relation Age of Onset     No Known Problems Mother      No Known Problems Father        Outpatient Medications:  No current facility-administered medications on file prior to encounter.  No current outpatient medications on file prior to encounter.       Physical Exam:    /60 (BP Location: Left arm)   Pulse 76   Temp 97.9  F (36.6  C) (Oral)   Resp 16   Ht 1.9 m (6' 2.8\")   Wt 98.4 kg (216 lb 14.9 oz)   SpO2 99%   BMI 27.26 kg/m    General Appearance:  Awake. Alert and oriented x4. Sitting up in bed. No acute distress.  HEENT: Normocephalic.Dressing to anterior neck.some right sided parapharyngeal ooze from theincision  Respiratory: Normal work of breathing on room air. Lungs CTAB. No wheezes.  Cardiovascular: RRR. S1, S2. No murmurs. Pedal pulses 2+ No lower extremity edema.  GI: Abdomen non-distended. Normoactive. PEG in place. Soft, non-tender abdomen.  Skin: Warm, dry. No rashes on exposed skin.  Neuro: No focal deficits. CN II-XII grossly intact.       Labs & Studies: I personally reviewed the following " studies:  ROUTINE LABS (Last four results):  [facility]  Recent Labs   Lab 05/20/22  1237 05/20/22  0852 05/20/22  0849 05/20/22  0454 05/19/22  0826 05/19/22  0613 05/18/22  0808 05/18/22  0635 05/17/22  1220 05/17/22  0805 05/15/22  1153 05/15/22  0808   NA  --  135  --   --   --  141  --  138  --  136   < > 135  135   POTASSIUM  --  3.9  --   --   --  3.8  --  3.7  --  3.8   < > 4.0  4.0   CHLORIDE  --  102  --   --   --  105  --  103  --  104   < > 104  104   CO2  --  26  --   --   --  26  --  26  --  24   < > 22  22   ANIONGAP  --  7  --   --   --  10  --  9  --  8   < > 9  9   * 86 87 86   < > 96   < > 105*   < > 99   < > 112*  112*   BUN  --  17  --   --   --  16  --  17  --  14   < > 14  14   CR  --  0.80  --   --   --  0.78  --  0.77  --  0.78   < > 0.68  0.76   GFRESTIMATED  --  >90  --   --   --  >90  --  >90  --  >90   < > >90  >90   OLYA  --  9.6  --   --   --  9.0  --  9.5  --  8.9   < > 8.7  8.7   MAG  --  2.3  --   --   --  2.2  --  2.3  --  2.3   < > 2.4*   PHOS  --  4.8*  --   --   --  4.7*  --  4.8*  --  4.2   < > 3.4   PROTTOTAL  --   --   --   --   --  8.6  --   --   --  8.7  --  8.2   ALBUMIN  --   --   --   --   --  2.5*  --   --   --  2.5*  --  2.4*   BILITOTAL  --   --   --   --   --  0.6  --   --   --  0.4  --  0.5   ALKPHOS  --   --   --   --   --  142  --   --   --  157*  --  176*   AST  --   --   --   --   --  62*  --   --   --  44  --  59*   ALT  --   --   --   --   --  102*  --   --   --  110*  --  130*    < > = values in this interval not displayed.     CBC  Recent Labs   Lab 05/20/22  0852 05/19/22  0613 05/18/22  0635 05/17/22  0936   WBC 4.9 5.0 6.6 7.5   RBC 3.65* 3.24* 3.33* 3.54*   HGB 10.8* 9.5* 9.9* 10.5*   HCT 34.7* 30.8* 31.5* 33.1*   MCV 95 95 95 94   MCH 29.6 29.3 29.7 29.7   MCHC 31.1* 30.8* 31.4* 31.7   RDW 13.8 13.8 13.8 13.4   * 482* 494* 516*     INR  Recent Labs   Lab 05/17/22  0805   INR 1.13     5/8/22:  IMPRESSION:  1. Occlusive DVT in the left IJ.  Central left subclavian vein and  innominate vein are not visualized due to the presence of overlying  bandage.  2. No DVT on the right.    5/6/22:CT soft tissue of neck:  IMPRESSION:  1.  CT of the head demonstrates no acute cranial abnormality. No  abnormal intracranial contrast enhancing lesions.   2.  CT of the neck shows postsurgical changes of anterior median  sternotomy and bilateral incision and drainage of cervical  parapharyngeal spaces for necrotizing infection.  Large open wound  extends over the anterior cervical soft tissues with diffuse  heterogeneous edema of multiple neck spaces as described in great  detail above sparing the danger space. No definite residual  rim-enhancing collection however, the edematous collections could  represent developing phlegmon.   3.  No definite flow seen in the left mid and inferior internal  jugular vein, compatible with thrombus. 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  4.  Diffuse dental caries, left maxillary periapical dental disease  and recent left mandibular dental extractions.     5/16/22:  IMPRESSION:  1. Periapical abscesses of the second right mandibular premolar, right  maxillary lateral and lateral incisors, and bilateral maxillary first  premolars.   2. Extensive dental caries.  3. Partially visualized left anterior neck wound and soft tissue gas  in the lower neck.     Assessment & Plan:   Robert Gallo is a 26 year old male transfered from [facility] for management of necrotizing fasciitis of neck with mediastinal involvement. He is now s/p I&D of bilateral parapharyngeal spaces, sublingual space, bilateral neck, extraction of teeth and bilateral VATS and PEG tube placement (5/4). Returned to OR on 5/13 for paraesophageal fluid collection. OMFS to ext 1, 2, 3, 5, 6, 7, 8, 9, 10, 11, 12, 14, 15, 16, 29, 30  with LA in [facility] on Monday 5/23/22 @8AM.     Patient has a provoked DVT in setting of necrotizing fascitis and recent  surgery. He is predisposed to develop clots in the acute inflammatory phase in view of elevated CRP. Treatment duration for provoked DVT are 3 months. Currently oral  Anticoagulation is on hold due to concern for bleeding. Hb has been stable, It looks like patient is still oozing from surgery site. When ENT team thinks its safe to restart the anticoagulation, heparin drip might be a good option to begin with due to its short half life.Its easy to hold it prior to dental procedure and resume the day after procedure.  Eventually , patient can be switched to oral DOAC prior to discharge.Its a good idea to monitor him for possible bleeding.   When team reinitiates DOAC.     Recommendations:   - In view of new provoked DVT , patient needs to be on 3 months anticoagulation  - When there is no active bleeding, please start patient on Heparin drip. Heparin drip can be kept on hold 6 hours prior to dental procedure. It can be restarted the morning after the procedure if no active bleeding  - Patient can be safely switched to DOAC at the time of discharge assuming no concern for bleeding.    Patient was seen and plan of care was discussed with attending physician Dr. Cuellar.    We will continue to follow this patient. Please don't hesitate to contact the Fellow On-Call with questions.    Jenifer Owens MD  Fellow PGY-4  Hematology/Oncology   Pager: [phone]

## 2022-05-20 NOTE — PLAN OF CARE
Status: Admitted 5/4 for necrotizing soft tissue infection. S/p thoracotomy, G-tube and I&D of pharyngeal abscess 5/4, exploration of neck 5/13.  Vitals: VSS on RA  Neuros: Intact  IV: PIV SL; IV abx  Resp/trach: Coughing intermittently; bleeding in mouth improved, managing independently  Diet: Full liquid diet liquidized/moderately thick (level 3). Bolus tube feeding, had 6/7 cans. Tolerating well.  Bowel status: LBM 5/20  : Voiding w/out difficulty  Skin: Site from old R chest tube covered with primapore, changed dsg. Old L chest tube site HIRAM. Neck wound sutured, w/penrose. Fluffs and surgilast in place, CDI. D R chest wound dressing CDI, changed x1.  Pain: PRN tylenol and atarax given for pain/comfort.   Activity: Up ad william; activity encouraged. Up in chair most of afternoon  Plan: Plan to have more teeth extracted on Monday w/oral surgery. Continue to manage pain and encourage intake and acitivity

## 2022-05-20 NOTE — PLAN OF CARE
Status: Admitted 5/4 for necrotizing soft tissue infection. S/p thoracotomy, G-tube and I&D of pharyngeal abscess 5/4, exploration of neck 5/13.  Vitals: VSS on RA  Neuros: Intact  IV: PIV SL between antibiotics  Labs/Electrolytes: WNL. BG checks  Resp/trach: Frequent cough, uses oral suction for secretions independently. Moderate bleeding from tooth removal  Diet: Full liquid diet liquidized/moderately thick (level 3). Bolus tube feeding, had 4/7 cans on 5/19. Did not want the remaining 3 this shift.   Bowel status: LB 5/19  : Voiding spont  Skin: Site from old R chest tube covered with primapore, old L chest tube site HIRAM. Neck wound sutured, penrose also in place - dressing over drain and incision CDI. R chest wound dressing CDI.   Pain: PRN tylenol and atarax given for pain/comfort.   Activity: Up ad william   Plan: Plan to have more teeth extracted, per dentistry. Continue to monitor and follow POC.

## 2022-05-20 NOTE — PROGRESS NOTES
ENT progress note  5/20/2022    S: Doing ok, some ongoing oral oozing     E: General: Appears well  HEENT: Stable neck, penrose with expected output, some expressed hematoma from the lateral aspect of the incision, chest wound without concern, some right sided parapharyngeal ooze from the intraoral incision    A/P:  Mr Gallo presented with an extensive odontogenic infection requiring multiple trips to the OR for source control. He continues to do well after his last washout with down trending inflammatory markers. Curbside recommendations for prolonged anticoagulation for internal jugular thrombosis however holding elliquis today due to concerns of ongoing bleeding. Will likely obtain formal recommendations. Discharge anticipated in the next few days pending ongoing recovery.    Everardo Hunter MD  ENT resident

## 2022-05-20 NOTE — PROGRESS NOTES
Care Management Follow Up    Length of Stay (days): 16    Expected Discharge Date:  5-23 or 5-24     Concerns to be Addressed:  Discharge planning  Patient plan of care discussed at interdisciplinary rounds: Yes    Anticipated Discharge Disposition:   Home     Additional Information:  In 6A Discharge Rounds nursing reported pt had a penrose placed in neck wound yesterday, changing BID. On full liquids; tube feeding thru PEG (had prior to admission). Had tooth extractions, having lots of oral secretions.     Informed by PATRICIO Keen, pt will need fluffs & ABD dressings for wound care after discharge. Dressing change is not complicated, pt should be able to do on his own. Anticipate discharge on Sunday, 5-22. Dental was consulted for further teeth extractions, no plan for that inpatient at this time.     This afternoon noted note from Oral Surgery, plan for teeth extraction on Monday, 5-23. Spoke with PATRICIO Sousa; due to complexity of pt's condition will plan for him to remain hospitalized for teeth extraction on Monday, 5-23.     Spoke with nurse, Morenita about plan. Pt wasn't sure yet where he was going to stay after discharge. Morenita also reported pt should be able to do his own dressing changes.     RNCC will follow for dressing supplies at discharge.         Ne Keys, RN Care Coordinator  Unit [address], [facility]

## 2022-05-20 NOTE — TELEPHONE ENCOUNTER
[facility] Call Center    Phone Message    May a detailed message be left on voicemail: yes     Reason for Call: Other: Patient is needing to be seen for a hospital follow up around 5/27 per inpatient note from Dr Arevalo. Writer unable to find an opening in time frame needed. Sending encounter to clinic for review/scheduling. Of note, there is no phone number listed in Epic for patient. Please try to schedule prior to patient discharging so appointment will appear on discharge paperwork for patient.      Action Taken: Message routed to:  [facility] ([facility]): ID    Travel Screening: Not Applicable

## 2022-05-20 NOTE — PROGRESS NOTES
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 Currently, Robert is planned to return to OR on 2022 following new intermittent fever and fluid collection of the left paraesophageal space for further neck exploration with ENT.      In an attempt to provide more comprehensive dental care, pt was evaluated by adult dentistry [facility] who recommended extraction of #1 2, 3, 5, 6, 7, 8, 9, 10, 11, 12, 14, 15, 16, 29 and 30. Currently the pts anticoagulation therapy has been held since 22. If not indicated to restart prior to 22 OMFS can complete these extractions under local anesthesia in our clinic on 22 @8AM. If anticoagulation therapy is restarted before this date, 3-4 teeth of the worst status will be prioritized during that visit.  Clinical exam of pt was not completed by OMFS immediately prior to this note.       PLAN:  DVT prophylaxis: Hold anticoagulation until POD 1 from exts if possible   HEENT: OMFS to ext 1, 2, 3, 5, 6, 7, 8, 9, 10, 11, 12, 14, 15, 16, 29, 30  with [place] in [facility] on 22 @8AM.     [facility]of[facility] Surgery [facility] - [facility]  [address] [place] [address]  Clinic phone number: [phone]  Clinic fax number: [phone]      Discussed with chief resident and staff.    Danitza Carrion DDS  Oral & Maxillofacial Intern/Dental Fellow      Please contact the OMFS resident on-call with questions or concerns.

## 2022-05-20 NOTE — PLAN OF CARE
Status: Admitted 5/4 for necrotizing soft tissue infection. S/p thoracotomy, Gtube and I&D of pharyngeal abscess 5/4, exploration of neck 5/13  Vitals: VSS on RA  Neuros: Intact  IV: PIV SL between antibiotics  Labs/Electrolytes: WNL. BG checks  Resp/trach: Frequent cough, uses oral suction for secretions independently. Minimal bleeding from tooth extraction sites  Diet: Moderately thickened liquids, tolerating. Received 4/7 cans of TF via PEG  Bowel status: LBM 5/19  : Voiding  Skin: R chest tube site covered with primapore, L chest tube site HIRAM. Neck wound sutured today, penrose also placed. Gauze changed this evening - minimal drainage. R chest wound repacked this evening per orders, no bleeding  Pain: Neck discomfort with movement, PRN tylenol/atarax/flexeril effective  Activity: Independent  Plan: Will likely need addtl teeth extracted, per dentistry. Continue POC

## 2022-05-21 LAB
ALBUMIN SERPL-MCNC: 2.9 G/DL (ref 3.4–5)
ALP SERPL-CCNC: 140 U/L (ref 40–150)
ALT SERPL W P-5'-P-CCNC: 87 U/L (ref 0–70)
ANION GAP SERPL CALCULATED.3IONS-SCNC: 9 MMOL/L (ref 3–14)
AST SERPL W P-5'-P-CCNC: 39 U/L (ref 0–45)
BACTERIA BLD CULT: NO GROWTH
BACTERIA BLD CULT: NO GROWTH
BILIRUB DIRECT SERPL-MCNC: 0.2 MG/DL (ref 0–0.2)
BILIRUB SERPL-MCNC: 0.5 MG/DL (ref 0.2–1.3)
BUN SERPL-MCNC: 16 MG/DL (ref 7–30)
CALCIUM SERPL-MCNC: 9.5 MG/DL (ref 8.5–10.1)
CHLORIDE BLD-SCNC: 103 MMOL/L (ref 94–109)
CO2 SERPL-SCNC: 25 MMOL/L (ref 20–32)
CREAT SERPL-MCNC: 0.86 MG/DL (ref 0.66–1.25)
CRP SERPL-MCNC: 24 MG/L (ref 0–8)
ERYTHROCYTE [DISTWIDTH] IN BLOOD BY AUTOMATED COUNT: 13.8 % (ref 10–15)
ERYTHROCYTE [DISTWIDTH] IN BLOOD BY AUTOMATED COUNT: 13.9 % (ref 10–15)
ERYTHROCYTE [SEDIMENTATION RATE] IN BLOOD BY WESTERGREN METHOD: 102 MM/HR (ref 0–15)
GFR SERPL CREATININE-BSD FRML MDRD: >90 ML/MIN/1.73M2
GLUCOSE BLD-MCNC: 100 MG/DL (ref 70–99)
GLUCOSE BLDC GLUCOMTR-MCNC: 125 MG/DL (ref 70–99)
GLUCOSE BLDC GLUCOMTR-MCNC: 131 MG/DL (ref 70–99)
GLUCOSE BLDC GLUCOMTR-MCNC: 91 MG/DL (ref 70–99)
GLUCOSE BLDC GLUCOMTR-MCNC: 95 MG/DL (ref 70–99)
GLUCOSE BLDC GLUCOMTR-MCNC: 99 MG/DL (ref 70–99)
HCT VFR BLD AUTO: 32.6 % (ref 40–53)
HCT VFR BLD AUTO: 33 % (ref 40–53)
HGB BLD-MCNC: 10.2 G/DL (ref 13.3–17.7)
HGB BLD-MCNC: 10.4 G/DL (ref 13.3–17.7)
MAGNESIUM SERPL-MCNC: 2.3 MG/DL (ref 1.6–2.3)
MCH RBC QN AUTO: 29.4 PG (ref 26.5–33)
MCH RBC QN AUTO: 29.6 PG (ref 26.5–33)
MCHC RBC AUTO-ENTMCNC: 31.3 G/DL (ref 31.5–36.5)
MCHC RBC AUTO-ENTMCNC: 31.5 G/DL (ref 31.5–36.5)
MCV RBC AUTO: 94 FL (ref 78–100)
MCV RBC AUTO: 94 FL (ref 78–100)
PHOSPHATE SERPL-MCNC: 4.5 MG/DL (ref 2.5–4.5)
PLATELET # BLD AUTO: 485 10E3/UL (ref 150–450)
PLATELET # BLD AUTO: 515 10E3/UL (ref 150–450)
POTASSIUM BLD-SCNC: 3.9 MMOL/L (ref 3.4–5.3)
PROT SERPL-MCNC: 9.2 G/DL (ref 6.8–8.8)
RBC # BLD AUTO: 3.47 10E6/UL (ref 4.4–5.9)
RBC # BLD AUTO: 3.51 10E6/UL (ref 4.4–5.9)
SODIUM SERPL-SCNC: 137 MMOL/L (ref 133–144)
UFH PPP CHRO-ACNC: 0.36 IU/ML
WBC # BLD AUTO: 4.3 10E3/UL (ref 4–11)
WBC # BLD AUTO: 4.4 10E3/UL (ref 4–11)

## 2022-05-21 PROCEDURE — 36415 COLL VENOUS BLD VENIPUNCTURE: CPT | Performed by: OTOLARYNGOLOGY

## 2022-05-21 PROCEDURE — 250N000013 HC RX MED GY IP 250 OP 250 PS 637: Performed by: STUDENT IN AN ORGANIZED HEALTH CARE EDUCATION/TRAINING PROGRAM

## 2022-05-21 PROCEDURE — 82248 BILIRUBIN DIRECT: CPT | Performed by: STUDENT IN AN ORGANIZED HEALTH CARE EDUCATION/TRAINING PROGRAM

## 2022-05-21 PROCEDURE — 85027 COMPLETE CBC AUTOMATED: CPT

## 2022-05-21 PROCEDURE — 250N000011 HC RX IP 250 OP 636: Performed by: STUDENT IN AN ORGANIZED HEALTH CARE EDUCATION/TRAINING PROGRAM

## 2022-05-21 PROCEDURE — 84100 ASSAY OF PHOSPHORUS: CPT | Performed by: STUDENT IN AN ORGANIZED HEALTH CARE EDUCATION/TRAINING PROGRAM

## 2022-05-21 PROCEDURE — 36415 COLL VENOUS BLD VENIPUNCTURE: CPT | Performed by: STUDENT IN AN ORGANIZED HEALTH CARE EDUCATION/TRAINING PROGRAM

## 2022-05-21 PROCEDURE — 85027 COMPLETE CBC AUTOMATED: CPT | Performed by: STUDENT IN AN ORGANIZED HEALTH CARE EDUCATION/TRAINING PROGRAM

## 2022-05-21 PROCEDURE — 999N000128 HC STATISTIC PERIPHERAL IV START W/O US GUIDANCE

## 2022-05-21 PROCEDURE — 250N000013 HC RX MED GY IP 250 OP 250 PS 637

## 2022-05-21 PROCEDURE — 99231 SBSQ HOSP IP/OBS SF/LOW 25: CPT | Performed by: INTERNAL MEDICINE

## 2022-05-21 PROCEDURE — 80053 COMPREHEN METABOLIC PANEL: CPT | Performed by: STUDENT IN AN ORGANIZED HEALTH CARE EDUCATION/TRAINING PROGRAM

## 2022-05-21 PROCEDURE — 250N000011 HC RX IP 250 OP 636

## 2022-05-21 PROCEDURE — 85520 HEPARIN ASSAY: CPT | Performed by: OTOLARYNGOLOGY

## 2022-05-21 PROCEDURE — 83735 ASSAY OF MAGNESIUM: CPT | Performed by: STUDENT IN AN ORGANIZED HEALTH CARE EDUCATION/TRAINING PROGRAM

## 2022-05-21 PROCEDURE — 85652 RBC SED RATE AUTOMATED: CPT | Performed by: STUDENT IN AN ORGANIZED HEALTH CARE EDUCATION/TRAINING PROGRAM

## 2022-05-21 PROCEDURE — 120N000002 HC R&B MED SURG/OB UMMC

## 2022-05-21 PROCEDURE — 36415 COLL VENOUS BLD VENIPUNCTURE: CPT

## 2022-05-21 PROCEDURE — 86140 C-REACTIVE PROTEIN: CPT | Performed by: STUDENT IN AN ORGANIZED HEALTH CARE EDUCATION/TRAINING PROGRAM

## 2022-05-21 RX ORDER — HEPARIN SODIUM 10000 [USP'U]/100ML
0-5000 INJECTION, SOLUTION INTRAVENOUS CONTINUOUS
Status: DISCONTINUED | OUTPATIENT
Start: 2022-05-21 | End: 2022-05-21

## 2022-05-21 RX ORDER — HEPARIN SODIUM 10000 [USP'U]/100ML
0-5000 INJECTION, SOLUTION INTRAVENOUS CONTINUOUS
Status: DISPENSED | OUTPATIENT
Start: 2022-05-21 | End: 2022-05-23

## 2022-05-21 RX ORDER — MINERAL OIL/HYDROPHIL PETROLAT
OINTMENT (GRAM) TOPICAL 3 TIMES DAILY
Status: DISCONTINUED | OUTPATIENT
Start: 2022-05-21 | End: 2022-05-26 | Stop reason: HOSPADM

## 2022-05-21 RX ORDER — ENOXAPARIN SODIUM 100 MG/ML
40 INJECTION SUBCUTANEOUS EVERY 24 HOURS
Status: DISCONTINUED | OUTPATIENT
Start: 2022-05-21 | End: 2022-05-21

## 2022-05-21 RX ADMIN — Medication 1 PACKET: at 20:56

## 2022-05-21 RX ADMIN — Medication 1 PACKET: at 12:29

## 2022-05-21 RX ADMIN — SENNOSIDES AND DOCUSATE SODIUM 2 TABLET: 8.6; 5 TABLET ORAL at 20:55

## 2022-05-21 RX ADMIN — Medication 1 PACKET: at 08:12

## 2022-05-21 RX ADMIN — HYDROXYZINE HYDROCHLORIDE 25 MG: 10 SOLUTION ORAL at 23:58

## 2022-05-21 RX ADMIN — AMPICILLIN SODIUM AND SULBACTAM SODIUM 3 G: 2; 1 INJECTION, POWDER, FOR SOLUTION INTRAMUSCULAR; INTRAVENOUS at 06:27

## 2022-05-21 RX ADMIN — Medication 1 PACKET: at 15:30

## 2022-05-21 RX ADMIN — ENOXAPARIN SODIUM 40 MG: 40 INJECTION SUBCUTANEOUS at 09:37

## 2022-05-21 RX ADMIN — WHITE PETROLATUM: 1.75 OINTMENT TOPICAL at 11:03

## 2022-05-21 RX ADMIN — CYCLOBENZAPRINE HYDROCHLORIDE 5 MG: 5 TABLET, FILM COATED ORAL at 23:59

## 2022-05-21 RX ADMIN — CYCLOBENZAPRINE HYDROCHLORIDE 5 MG: 5 TABLET, FILM COATED ORAL at 15:30

## 2022-05-21 RX ADMIN — Medication 2 PACKET: at 08:12

## 2022-05-21 RX ADMIN — AMPICILLIN SODIUM AND SULBACTAM SODIUM 3 G: 2; 1 INJECTION, POWDER, FOR SOLUTION INTRAMUSCULAR; INTRAVENOUS at 18:26

## 2022-05-21 RX ADMIN — WHITE PETROLATUM: 1.75 OINTMENT TOPICAL at 14:17

## 2022-05-21 RX ADMIN — CYCLOBENZAPRINE HYDROCHLORIDE 5 MG: 5 TABLET, FILM COATED ORAL at 08:12

## 2022-05-21 RX ADMIN — ACETAMINOPHEN 325MG 650 MG: 325 TABLET ORAL at 20:55

## 2022-05-21 RX ADMIN — Medication 2 PACKET: at 20:56

## 2022-05-21 RX ADMIN — SENNOSIDES AND DOCUSATE SODIUM 2 TABLET: 8.6; 5 TABLET ORAL at 08:10

## 2022-05-21 RX ADMIN — HEPARIN SODIUM AND DEXTROSE 1200 UNITS/HR: 10000; 5 INJECTION INTRAVENOUS at 14:09

## 2022-05-21 RX ADMIN — FLUCONAZOLE 400 MG: 200 TABLET ORAL at 08:11

## 2022-05-21 RX ADMIN — ACETAMINOPHEN 325MG 650 MG: 325 TABLET ORAL at 15:30

## 2022-05-21 RX ADMIN — AMPICILLIN SODIUM AND SULBACTAM SODIUM 3 G: 2; 1 INJECTION, POWDER, FOR SOLUTION INTRAMUSCULAR; INTRAVENOUS at 12:29

## 2022-05-21 RX ADMIN — WHITE PETROLATUM: 1.75 OINTMENT TOPICAL at 20:57

## 2022-05-21 RX ADMIN — AMPICILLIN SODIUM AND SULBACTAM SODIUM 3 G: 2; 1 INJECTION, POWDER, FOR SOLUTION INTRAMUSCULAR; INTRAVENOUS at 23:58

## 2022-05-21 RX ADMIN — ACETAMINOPHEN 325MG 650 MG: 325 TABLET ORAL at 08:11

## 2022-05-21 ASSESSMENT — ACTIVITIES OF DAILY LIVING (ADL)
ADLS_ACUITY_SCORE: 20

## 2022-05-21 NOTE — PROGRESS NOTES
Hematology Consult Note   Date of Service: 05/21/2022    Patient: Robert Gallo  MRN: [id number]  Admission Date: 5/4/2022  Hospital Day # 17       Reason for Consult: management of anticoagulation    Patient appears to be in no acute distress. No active bleeding but slightly oozing from incision site. He has poor dentation.He is tolerating Full liquid diet. Thre is no noticeable swelling of any of extremities.There is no visible bleeding inside his mouth.    Review of Systems: Pertinent positive and negative systems described in HPI; the remainder of the 14 systems are negative    Past Medical History:  Past Medical History:   Diagnosis Date     No known problems        Past Surgical History:  Past Surgical History:   Procedure Laterality Date     EXPLORE NECK N/A 5/13/2022    Procedure: Oral Exploration, Neck Exploration;  Surgeon: Syd Vila MD;  Location: [facility] OR     IRRIGATION AND DEBRIDEMENT NECK, COMBINED N/A 5/4/2022    Procedure: INCISION AND DRAINAGE PARAPHARYNGEAL ABCESS ON RIGHT AND LEFT;  Surgeon: Milena Gore MD;  Location: [facility] OR     PICC DOUBLE LUMEN PLACEMENT Left 05/04/2022    Left basilic, 47 cm, 1 external length     THORACOSCOPY N/A 5/4/2022    Procedure: BILATERAL VIDEO ASSISTED THORACOTOMY. RIGHT ANTERIOR MEDIASTINOTYOMY. PEG TUBE.;  Surgeon: Abundio Patel MD;  Location: [facility] OR     THORACOSCOPY  5/4/2022    Procedure: ;  Surgeon: Abundio Patel MD;  Location: [facility] OR       Social History:  Social History     Socioeconomic History     Marital status: Single     Spouse name: None     Number of children: None     Years of education: None     Highest education level: None        Family History  Family History   Problem Relation Age of Onset     No Known Problems Mother      No Known Problems Father        Outpatient Medications:  No current facility-administered medications on file prior to encounter.  No current outpatient medications on file prior to encounter.    "    Physical Exam:    /76 (BP Location: Left arm)   Pulse 82   Temp (!) 96.2  F (35.7  C) (Oral)   Resp 16   Ht 1.9 m (6' 2.8\")   Wt 98.4 kg (216 lb 14.9 oz)   SpO2 100%   BMI 27.26 kg/m    General Appearance:  Awake. Alert and oriented x4. Sitting up in bed. No acute distress.  HEENT: Normocephalic.Dressing to anterior neck.  GI: Abdomen non-distended. Normoactive. PEG in place. Soft, non-tender abdomen.  Skin: Warm, dry. No rashes on exposed skin.  Neuro: No focal deficits.      Labs & Studies: I personally reviewed the following studies:  ROUTINE LABS (Last four results):  [facility]  Recent Labs   Lab 05/21/22  1227 05/21/22  0814 05/21/22  0717 05/21/22  0443 05/20/22  1237 05/20/22  0852 05/19/22  0826 05/19/22  0613 05/18/22  0808 05/18/22  0635 05/17/22  1220 05/17/22  0805 05/15/22  1153 05/15/22  0808   NA  --   --  137  --   --  135  --  141  --  138  --  136   < > 135  135   POTASSIUM  --   --  3.9  --   --  3.9  --  3.8  --  3.7  --  3.8   < > 4.0  4.0   CHLORIDE  --   --  103  --   --  102  --  105  --  103  --  104   < > 104  104   CO2  --   --  25  --   --  26  --  26  --  26  --  24   < > 22  22   ANIONGAP  --   --  9  --   --  7  --  10  --  9  --  8   < > 9  9   * 91 100* 95   < > 86   < > 96   < > 105*   < > 99   < > 112*  112*   BUN  --   --  16  --   --  17  --  16  --  17  --  14   < > 14  14   CR  --   --  0.86  --   --  0.80  --  0.78  --  0.77  --  0.78   < > 0.68  0.76   GFRESTIMATED  --   --  >90  --   --  >90  --  >90  --  >90  --  >90   < > >90  >90   OLYA  --   --  9.5  --   --  9.6  --  9.0  --  9.5  --  8.9   < > 8.7  8.7   MAG  --   --  2.3  --   --  2.3  --  2.2  --  2.3  --  2.3   < > 2.4*   PHOS  --   --  4.5  --   --  4.8*  --  4.7*  --  4.8*  --  4.2   < > 3.4   PROTTOTAL  --   --  9.2*  --   --   --   --  8.6  --   --   --  8.7  --  8.2   ALBUMIN  --   --  2.9*  --   --   --   --  2.5*  --   --   --  2.5*  --  2.4*   BILITOTAL  --   --  0.5  --   --   --  "  --  0.6  --   --   --  0.4  --  0.5   ALKPHOS  --   --  140  --   --   --   --  142  --   --   --  157*  --  176*   AST  --   --  39  --   --   --   --  62*  --   --   --  44  --  59*   ALT  --   --  87*  --   --   --   --  102*  --   --   --  110*  --  130*    < > = values in this interval not displayed.     CBC  Recent Labs   Lab 05/21/22  1227 05/21/22  0717 05/20/22  0852 05/19/22  0613   WBC 4.3 4.4 4.9 5.0   RBC 3.47* 3.51* 3.65* 3.24*   HGB 10.2* 10.4* 10.8* 9.5*   HCT 32.6* 33.0* 34.7* 30.8*   MCV 94 94 95 95   MCH 29.4 29.6 29.6 29.3   MCHC 31.3* 31.5 31.1* 30.8*   RDW 13.8 13.9 13.8 13.8   * 515* 534* 482*     INR  Recent Labs   Lab 05/17/22  0805   INR 1.13     5/8/22:  IMPRESSION:  1. Occlusive DVT in the left IJ. Central left subclavian vein and  innominate vein are not visualized due to the presence of overlying  bandage.  2. No DVT on the right.    5/6/22:CT soft tissue of neck:  IMPRESSION:  1.  CT of the head demonstrates no acute cranial abnormality. No  abnormal intracranial contrast enhancing lesions.   2.  CT of the neck shows postsurgical changes of anterior median  sternotomy and bilateral incision and drainage of cervical  parapharyngeal spaces for necrotizing infection.  Large open wound  extends over the anterior cervical soft tissues with diffuse  heterogeneous edema of multiple neck spaces as described in great  detail above sparing the danger space. No definite residual  rim-enhancing collection however, the edematous collections could  represent developing phlegmon.   3.  No definite flow seen in the left mid and inferior internal  jugular vein, compatible with thrombus. 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  4.  Diffuse dental caries, left maxillary periapical dental disease  and recent left mandibular dental extractions.     5/16/22:  IMPRESSION:  1. Periapical abscesses of the second right mandibular premolar, right  maxillary lateral and lateral  incisors, and bilateral maxillary first  premolars.   2. Extensive dental caries.  3. Partially visualized left anterior neck wound and soft tissue gas  in the lower neck.     Assessment & Plan:   Robert Gallo is a 26 year old male transfered from [facility] for management of necrotizing fasciitis of neck with mediastinal involvement. He is now s/p I&D of bilateral parapharyngeal spaces, sublingual space, bilateral neck, extraction of teeth and bilateral VATS and PEG tube placement (5/4). Returned to OR on 5/13 for paraesophageal fluid collection. OMFS to ext 1, 2, 3, 5, 6, 7, 8, 9, 10, 11, 12, 14, 15, 16, 29, 30  with [place] in [facility] on Monday 5/23/22 @8AM.     Patient has a provoked DVT in setting of necrotizing fascitis and recent surgery. He is predisposed to develop clots in the acute inflammatory phase in view of elevated CRP. Treatment duration for provoked DVT are 3 months. Amount of oozing not significant to cause hemodynamic insufficiency but team decides to hold off on giving anticoagulation at this point and they prefer to start it after dental procedure.In that case, heparin drip can be resume 12-24h after oral surgery. Plan is to discharge patient on DOAC.     Recommendations:   - In view of new provoked DVT , patient needs to be on 3 months anticoagulation  - Please use oral swish and spit Amicar  - Resume heparin drip with resuming oral amicar solution 12-24h after oral surgery   -Plan to discharge on either Apixaban or Rivaroxaban--- facor Rivaraoxaban due to once a day dosing after the first 21 days.    -Mild thrombocytosis is most likely reactive to anemia well controlled will continue monitoring labs .    Patient was seen and plan of care was discussed with attending physician Dr. Clemente.    We will continue to follow this patient. Please don't hesitate to contact the Fellow On-Call with questions.    Jenifer Owens MD  Fellow PGY-4  Hematology/Oncology   Pager: [phone]

## 2022-05-21 NOTE — PLAN OF CARE
Status: Admitted 5/4 for necrotizing soft tissue infection. S/p thoracotomy, G-tube and I&D of pharyngeal abscess 5/4, exploration of neck 5/13.  Vitals: VSS on RA  Neuros: Intact  IV: PIV SL between antibiotics  Labs/Electrolytes: WNL. BG checks  Resp/trach: Infrequent cough. Minimal bleeding from tooth extractions.   Diet: Full liquid diet liquidized/moderately thick (level 3). Tolerated moderately thick drinks overnight. Bolus tube feeding, had 6/7 cans on 5/20. Did not want the remaining can this shift.   Bowel status: [facility] 5/20   : Voiding spont  Skin: Site from old R chest tube covered with primapore, old L chest tube site HIRAM. Neck wound sutured, penrose also in place - minimal serosang drainage from penrose - Surgilast around neck incision CDI. R chest wound dressing changed this shift.   Pain: PRN tylenol, atarax and flexeril given for pain/comfort.   Activity: Up ad william   Plan: Plan to have more teeth extracted on Monday, per dentistry. Continue to monitor and follow POC.

## 2022-05-21 NOTE — PROVIDER NOTIFICATION
0300 - Wondering if you could put in orders for neck dressing/wound care? Do you want nursing to change when saturated or leave for team to perform dressing changes?     0313 - Per  reinforcement by nursing okay, leave dressing changes for team

## 2022-05-21 NOTE — PROGRESS NOTES
ENT progress note  5/21/2022    S: Patient reports less oozing from oral cavity, very scant overnight. Otherwise feels well. Reports increased spirits and feels good about plan of care.     O: General: Appears well  HEENT: Stable neck, penrose with expected output, no blood. Chest wound clean and dry with subcutaneous tissue and muscle exposed. Oral cavity with 1cm mass of granulation tissue and clot near the right retromolar trigone.      Procedures:   Closure of right anterior chest wound  Consent was obtained for the procedure. 6CCs of 1% lidocaine with 1:100,000 epinephrine were infiltrated near the border of the wound. 6 buried stitches with 3-0 vicryl suture were used to close the deep layer. A running nylon stitch closed the skin.     Removal of right RMT granulation tissue:  Consent was obtained for the procedure. The tissue was infiltrated with 1cc of 1% lidocaine with 1:100,000 epinephrine. The tissue was then gently removed with a pick-up.     A/P:  Mr Gallo presented with an extensive odontogenic infection requiring multiple trips to the OR for source control. He continues to do well after his last washout with down trending inflammatory markers. Curbside recommendations for prolonged anticoagulation for internal jugular thrombosis, however holding until teeth extraction on Monday. Will plan to resume Eliquis after the extraction pending level of bleeding from oral cavity. Anticipated discharge early-mid next week.     Marcelo Blackburn  ENT resident

## 2022-05-21 NOTE — PLAN OF CARE
Status: Admitted 5/4 for necrotizing soft tissue infection. S/p thoracotomy, G-tube and I&D of pharyngeal abscess 5/4, exploration of neck 5/13.  Vitals: VSS on RA  Neuros: Intact  IV: Heparin gtt @1200 via PIV. IV antibiotics  Labs/Electrolytes: Next Anti Xa level to be drawn at 2015.  Resp/trach: Infrequent cough. No bleeding from mouth this shift  Diet: Full liquid diet liquidized/moderately thick (level 3). Drinking intermittently, tolerating well. Bolus TF, 6/7 cans completed.   Bowel status: LBM 5/21  : Voiding w/out difficulty  Skin: Site from old R chest tube covered with primapore, changed x1, old L chest tube site HIRAM. Neck wound sutured, penrose in place small amt serosang drainage; fluffs and Surgilast changed x1. R chest wound sutured and HIRAM; no drainage; aquaphor applied  Pain: PRN tylenol, and flexeril given for L neck pain/comfort.   Activity: Up ad william   Plan: Plan to have more teeth extracted on Monday w/oral surgery. Continue to encourage activity and intake as tolerated

## 2022-05-22 ENCOUNTER — APPOINTMENT (OUTPATIENT)
Dept: SPEECH THERAPY | Facility: CLINIC | Age: 27
End: 2022-05-22
Payer: COMMERCIAL

## 2022-05-22 LAB
ANION GAP SERPL CALCULATED.3IONS-SCNC: 8 MMOL/L (ref 3–14)
BACTERIA BLD CULT: NO GROWTH
BACTERIA BLD CULT: NO GROWTH
BUN SERPL-MCNC: 15 MG/DL (ref 7–30)
CALCIUM SERPL-MCNC: 9.2 MG/DL (ref 8.5–10.1)
CHLORIDE BLD-SCNC: 101 MMOL/L (ref 94–109)
CO2 SERPL-SCNC: 27 MMOL/L (ref 20–32)
CREAT SERPL-MCNC: 0.74 MG/DL (ref 0.66–1.25)
CRP SERPL-MCNC: 18 MG/L (ref 0–8)
ERYTHROCYTE [DISTWIDTH] IN BLOOD BY AUTOMATED COUNT: 13.7 % (ref 10–15)
ERYTHROCYTE [SEDIMENTATION RATE] IN BLOOD BY WESTERGREN METHOD: 88 MM/HR (ref 0–15)
GFR SERPL CREATININE-BSD FRML MDRD: >90 ML/MIN/1.73M2
GLUCOSE BLD-MCNC: 88 MG/DL (ref 70–99)
GLUCOSE BLDC GLUCOMTR-MCNC: 102 MG/DL (ref 70–99)
GLUCOSE BLDC GLUCOMTR-MCNC: 107 MG/DL (ref 70–99)
GLUCOSE BLDC GLUCOMTR-MCNC: 110 MG/DL (ref 70–99)
GLUCOSE BLDC GLUCOMTR-MCNC: 130 MG/DL (ref 70–99)
GLUCOSE BLDC GLUCOMTR-MCNC: 91 MG/DL (ref 70–99)
HCT VFR BLD AUTO: 30.8 % (ref 40–53)
HGB BLD-MCNC: 9.8 G/DL (ref 13.3–17.7)
MAGNESIUM SERPL-MCNC: 2.2 MG/DL (ref 1.6–2.3)
MCH RBC QN AUTO: 29.9 PG (ref 26.5–33)
MCHC RBC AUTO-ENTMCNC: 31.8 G/DL (ref 31.5–36.5)
MCV RBC AUTO: 94 FL (ref 78–100)
PHOSPHATE SERPL-MCNC: 4.4 MG/DL (ref 2.5–4.5)
PLATELET # BLD AUTO: 407 10E3/UL (ref 150–450)
POTASSIUM BLD-SCNC: 3.9 MMOL/L (ref 3.4–5.3)
RBC # BLD AUTO: 3.28 10E6/UL (ref 4.4–5.9)
SODIUM SERPL-SCNC: 136 MMOL/L (ref 133–144)
UFH PPP CHRO-ACNC: 0.21 IU/ML
UFH PPP CHRO-ACNC: 0.22 IU/ML
UFH PPP CHRO-ACNC: 0.23 IU/ML
WBC # BLD AUTO: 3.5 10E3/UL (ref 4–11)

## 2022-05-22 PROCEDURE — 82310 ASSAY OF CALCIUM: CPT | Performed by: STUDENT IN AN ORGANIZED HEALTH CARE EDUCATION/TRAINING PROGRAM

## 2022-05-22 PROCEDURE — 86140 C-REACTIVE PROTEIN: CPT | Performed by: STUDENT IN AN ORGANIZED HEALTH CARE EDUCATION/TRAINING PROGRAM

## 2022-05-22 PROCEDURE — 85027 COMPLETE CBC AUTOMATED: CPT | Performed by: STUDENT IN AN ORGANIZED HEALTH CARE EDUCATION/TRAINING PROGRAM

## 2022-05-22 PROCEDURE — 250N000013 HC RX MED GY IP 250 OP 250 PS 637: Performed by: STUDENT IN AN ORGANIZED HEALTH CARE EDUCATION/TRAINING PROGRAM

## 2022-05-22 PROCEDURE — 85520 HEPARIN ASSAY: CPT | Performed by: OTOLARYNGOLOGY

## 2022-05-22 PROCEDURE — 85652 RBC SED RATE AUTOMATED: CPT | Performed by: STUDENT IN AN ORGANIZED HEALTH CARE EDUCATION/TRAINING PROGRAM

## 2022-05-22 PROCEDURE — 250N000011 HC RX IP 250 OP 636

## 2022-05-22 PROCEDURE — 250N000011 HC RX IP 250 OP 636: Performed by: STUDENT IN AN ORGANIZED HEALTH CARE EDUCATION/TRAINING PROGRAM

## 2022-05-22 PROCEDURE — 36415 COLL VENOUS BLD VENIPUNCTURE: CPT | Performed by: OTOLARYNGOLOGY

## 2022-05-22 PROCEDURE — 120N000002 HC R&B MED SURG/OB UMMC

## 2022-05-22 PROCEDURE — 92526 ORAL FUNCTION THERAPY: CPT | Mod: GN

## 2022-05-22 PROCEDURE — 83735 ASSAY OF MAGNESIUM: CPT | Performed by: STUDENT IN AN ORGANIZED HEALTH CARE EDUCATION/TRAINING PROGRAM

## 2022-05-22 PROCEDURE — 250N000013 HC RX MED GY IP 250 OP 250 PS 637

## 2022-05-22 PROCEDURE — 84100 ASSAY OF PHOSPHORUS: CPT | Performed by: STUDENT IN AN ORGANIZED HEALTH CARE EDUCATION/TRAINING PROGRAM

## 2022-05-22 RX ADMIN — AMPICILLIN SODIUM AND SULBACTAM SODIUM 3 G: 2; 1 INJECTION, POWDER, FOR SOLUTION INTRAMUSCULAR; INTRAVENOUS at 18:37

## 2022-05-22 RX ADMIN — WHITE PETROLATUM: 1.75 OINTMENT TOPICAL at 14:01

## 2022-05-22 RX ADMIN — AMPICILLIN SODIUM AND SULBACTAM SODIUM 3 G: 2; 1 INJECTION, POWDER, FOR SOLUTION INTRAMUSCULAR; INTRAVENOUS at 06:34

## 2022-05-22 RX ADMIN — CYCLOBENZAPRINE HYDROCHLORIDE 5 MG: 5 TABLET, FILM COATED ORAL at 09:24

## 2022-05-22 RX ADMIN — ACETAMINOPHEN 325MG 650 MG: 325 TABLET ORAL at 09:23

## 2022-05-22 RX ADMIN — WHITE PETROLATUM: 1.75 OINTMENT TOPICAL at 20:59

## 2022-05-22 RX ADMIN — HYDROXYZINE HYDROCHLORIDE 25 MG: 10 SOLUTION ORAL at 09:46

## 2022-05-22 RX ADMIN — CYCLOBENZAPRINE HYDROCHLORIDE 5 MG: 5 TABLET, FILM COATED ORAL at 16:55

## 2022-05-22 RX ADMIN — AMPICILLIN SODIUM AND SULBACTAM SODIUM 3 G: 2; 1 INJECTION, POWDER, FOR SOLUTION INTRAMUSCULAR; INTRAVENOUS at 11:52

## 2022-05-22 RX ADMIN — Medication 2 PACKET: at 09:23

## 2022-05-22 RX ADMIN — Medication 1 PACKET: at 15:20

## 2022-05-22 RX ADMIN — HEPARIN SODIUM AND DEXTROSE 1350 UNITS/HR: 10000; 5 INJECTION INTRAVENOUS at 09:37

## 2022-05-22 RX ADMIN — WHITE PETROLATUM: 1.75 OINTMENT TOPICAL at 09:24

## 2022-05-22 RX ADMIN — Medication 1 PACKET: at 09:22

## 2022-05-22 RX ADMIN — ACETAMINOPHEN 325MG 650 MG: 325 TABLET ORAL at 18:36

## 2022-05-22 RX ADMIN — Medication 2 PACKET: at 20:54

## 2022-05-22 RX ADMIN — Medication 1 PACKET: at 11:52

## 2022-05-22 RX ADMIN — FLUCONAZOLE 400 MG: 200 TABLET ORAL at 09:23

## 2022-05-22 RX ADMIN — ACETAMINOPHEN 325MG 650 MG: 325 TABLET ORAL at 14:01

## 2022-05-22 ASSESSMENT — ACTIVITIES OF DAILY LIVING (ADL)
ADLS_ACUITY_SCORE: 20

## 2022-05-22 NOTE — PLAN OF CARE
Status: Admitted 5/4 for necrotizing soft tissue infection. S/p thoracotomy, G-tube and I&D of pharyngeal abscess 5/4, exploration of neck 5/13.  Vitals: VSS on RA  Neuros: Intact  IV: PIV x2, infusing TKO between abx and heparin gtt at 1200 units per hour. Next hep anti Xa draw at 0530 - not yet collected, will call lab.   Labs/Electrolytes: WNL. BG checks  Resp/trach: Infrequent cough. No report of bleeding from tooth extractions.   Diet: Full liquid diet liquidized/moderately thick (level 3). Tolerated moderately thick drinks overnight. Bolus tube feeding, had 6/7 cans on 5/21. Did not want the remaining can this shift.   Bowel status: LBM 5/21, taking bowel meds.   : Voiding spont  Skin: Site from old R chest tube covered with primapore, changed this shift. Left chest tube site HIRAM. Neck wound sutured, sugilast with 4x4 in place - replaced this shift, small amount of serosang drainage, penrose in place with minimal serosang drainage. R chest wound sutured, covered this shift with 4x4/tape per patient request for comfort, okayed by Dr. Gonzalez.   Pain: PRN tylenol, atarax and flexeril given for pain/comfort this shift - patient slept most of the night.   Activity: Up ad william   Plan: Plan to have more teeth extracted on Monday, per dentistry. Stop heparin gtt 5/23 at 0200.

## 2022-05-22 NOTE — PROGRESS NOTES
"Otolaryngology Progress Note  May 22, 2022    SUBJECTIVE: No acute events overnight.     OBJECTIVE:   /65 (BP Location: Left arm)   Pulse 88   Temp 97.7  F (36.5  C) (Oral)   Resp 16   Ht 1.9 m (6' 2.8\")   Wt 98.4 kg (216 lb 14.9 oz)   SpO2 100%   BMI 27.26 kg/m     General: Alert and oriented x 3, No acute distress   HEENT: Midline neck wound with penrose and scant bloody drainage. Chest incision sutures intact, clean. No surrounding skin changes or erythema.    Pulmonary: Breathing non-labored, no stridor, no accessory muscle use.      LABS:  ROUTINE IP LABS (Last four results)  BMP  Recent Labs   Lab 05/22/22  0730 05/22/22  0350 05/22/22  0008 05/21/22 2037 05/21/22  0814 05/21/22  0717 05/20/22  1237 05/20/22  0852 05/19/22  0826 05/19/22  0613     --   --   --   --  137  --  135  --  141   POTASSIUM 3.9  --   --   --   --  3.9  --  3.9  --  3.8   CHLORIDE 101  --   --   --   --  103  --  102  --  105   OLYA 9.2  --   --   --   --  9.5  --  9.6  --  9.0   CO2 27  --   --   --   --  25  --  26  --  26   BUN 15  --   --   --   --  16  --  17  --  16   CR 0.74  --   --   --   --  0.86  --  0.80  --  0.78   GLC 88 91 110* 131*   < > 100*   < > 86   < > 96    < > = values in this interval not displayed.     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 Midline neck incision partially closed at bedside on 5/21, Penrose removed today and saline-soaked kerlix packed lightly.    Neuro:  - Pain control    HEENT:  - Penrose removed, RN to complete PM packing change, remove neck packing and replace with saline-soaked kerlix. Do not need to pack aggressively, stop with any resistance.    Respiratory:  - supplemental O2 PRN to keep sats >92%    CV/heme:  - hemodynamically stable  - Heparin gtt for internal jugular thrombus per Heme, holding at midnight for OR tomorrow    FEN/GI:  - Full liquid Diet, G-tube  - NPO per anesthesia guidelines at midnight  - Bowel regimen    :  - voiding independently    ID:  - ID recommending IV unasyn while inpatient and Augmentin on discharge until at least 5/27 with outpatient follow-up and repeat imaging at that time to determine need for continuation    PPX:  - SCDs  - IS        Dispo: Pending convalescence    -- Patient and above plan discussed with Dr Margarita Jerome

## 2022-05-22 NOTE — PROGRESS NOTES
Otolaryngology Attending    Robert is improving each day. His wounds look appropriate and are healing.  He has a penrose at the center neck and with the heparin is having some bloody drainage.   He is otherwise stable, in minimal pain, drinking apple juice and taking G tube feeds.     I removed the penrose and explored the central neck wound- there is only a central 2.cm depth wound with no obvious tracking. I packed this with saline soaked curlex.    I would like him to change to twice daily packing in the central neck with saline moistened curlex. This is just central packing that does not need to be tunneled.     He will have his teeth managed tomorrow. His heparin needs to be held for the procedure.

## 2022-05-22 NOTE — PLAN OF CARE
Status: Admitted 5/4 for necrotizing soft tissue infection. S/p thoracotomy, G-tube and I&D of pharyngeal abscess 5/4, exploration of neck 5/13.  Vitals: VSS on RA  Neuros: Intact  IV: Heparin gtt @1500 via PIV. Hep gtt to be stopped at 0200 5/23. IV antibiotics  Labs/Electrolytes: Next Anti Xa level to be drawn at 2130  Resp/trach: Infrequent cough. No bleeding from mouth this shift  Diet: Full liquid diet liquidized/moderately thick (level 3). Drinking intermittently, tolerating well. Bolus TF, 6/7 cans completed. NPO at midnight for procedure 5/23  Bowel status: LBM 5/21  : Voiding w/out difficulty  Skin: Site from old R chest tube covered with primapore, changed x1, old L chest tube site HIRAM. Neck wound sutured, completed PM packing w/saline soaked kerlix; Surgilast changed x1. R chest wound sutured, scant drainage; aquaphor applied. 4x4 gauze taped over, per pt request  Pain: PRN tylenol, and flexeril given for L neck pain/comfort. Atarax given x1  Activity: Up ad william   Plan: Plan to have more teeth extracted on Monday w/oral surgery. Continue to encourage activity and intake as tolerated

## 2022-05-23 LAB
ALBUMIN SERPL-MCNC: 3 G/DL (ref 3.4–5)
ALP SERPL-CCNC: 135 U/L (ref 40–150)
ALT SERPL W P-5'-P-CCNC: 72 U/L (ref 0–70)
ANION GAP SERPL CALCULATED.3IONS-SCNC: 8 MMOL/L (ref 3–14)
AST SERPL W P-5'-P-CCNC: 32 U/L (ref 0–45)
BILIRUB DIRECT SERPL-MCNC: 0.1 MG/DL (ref 0–0.2)
BILIRUB SERPL-MCNC: 0.4 MG/DL (ref 0.2–1.3)
BUN SERPL-MCNC: 14 MG/DL (ref 7–30)
CALCIUM SERPL-MCNC: 9.6 MG/DL (ref 8.5–10.1)
CHLORIDE BLD-SCNC: 102 MMOL/L (ref 94–109)
CO2 SERPL-SCNC: 25 MMOL/L (ref 20–32)
CREAT SERPL-MCNC: 0.81 MG/DL (ref 0.66–1.25)
CRP SERPL-MCNC: 15 MG/L (ref 0–8)
ERYTHROCYTE [DISTWIDTH] IN BLOOD BY AUTOMATED COUNT: 13.9 % (ref 10–15)
ERYTHROCYTE [SEDIMENTATION RATE] IN BLOOD BY WESTERGREN METHOD: 91 MM/HR (ref 0–15)
GFR SERPL CREATININE-BSD FRML MDRD: >90 ML/MIN/1.73M2
GLUCOSE BLD-MCNC: 87 MG/DL (ref 70–99)
GLUCOSE BLDC GLUCOMTR-MCNC: 100 MG/DL (ref 70–99)
GLUCOSE BLDC GLUCOMTR-MCNC: 105 MG/DL (ref 70–99)
GLUCOSE BLDC GLUCOMTR-MCNC: 76 MG/DL (ref 70–99)
GLUCOSE BLDC GLUCOMTR-MCNC: 85 MG/DL (ref 70–99)
GLUCOSE BLDC GLUCOMTR-MCNC: 90 MG/DL (ref 70–99)
GLUCOSE BLDC GLUCOMTR-MCNC: 93 MG/DL (ref 70–99)
HCT VFR BLD AUTO: 33.3 % (ref 40–53)
HGB BLD-MCNC: 10.5 G/DL (ref 13.3–17.7)
MAGNESIUM SERPL-MCNC: 2.1 MG/DL (ref 1.6–2.3)
MCH RBC QN AUTO: 29.4 PG (ref 26.5–33)
MCHC RBC AUTO-ENTMCNC: 31.5 G/DL (ref 31.5–36.5)
MCV RBC AUTO: 93 FL (ref 78–100)
PHOSPHATE SERPL-MCNC: 4.8 MG/DL (ref 2.5–4.5)
PLATELET # BLD AUTO: 408 10E3/UL (ref 150–450)
POTASSIUM BLD-SCNC: 4 MMOL/L (ref 3.4–5.3)
PROT SERPL-MCNC: 9.1 G/DL (ref 6.8–8.8)
RBC # BLD AUTO: 3.57 10E6/UL (ref 4.4–5.9)
SODIUM SERPL-SCNC: 135 MMOL/L (ref 133–144)
UFH PPP CHRO-ACNC: <0.1 IU/ML
WBC # BLD AUTO: 3.5 10E3/UL (ref 4–11)

## 2022-05-23 PROCEDURE — 85520 HEPARIN ASSAY: CPT | Performed by: OTOLARYNGOLOGY

## 2022-05-23 PROCEDURE — 80053 COMPREHEN METABOLIC PANEL: CPT | Performed by: STUDENT IN AN ORGANIZED HEALTH CARE EDUCATION/TRAINING PROGRAM

## 2022-05-23 PROCEDURE — 86140 C-REACTIVE PROTEIN: CPT | Performed by: STUDENT IN AN ORGANIZED HEALTH CARE EDUCATION/TRAINING PROGRAM

## 2022-05-23 PROCEDURE — 85027 COMPLETE CBC AUTOMATED: CPT | Performed by: STUDENT IN AN ORGANIZED HEALTH CARE EDUCATION/TRAINING PROGRAM

## 2022-05-23 PROCEDURE — 250N000013 HC RX MED GY IP 250 OP 250 PS 637: Performed by: STUDENT IN AN ORGANIZED HEALTH CARE EDUCATION/TRAINING PROGRAM

## 2022-05-23 PROCEDURE — 120N000002 HC R&B MED SURG/OB UMMC

## 2022-05-23 PROCEDURE — 250N000013 HC RX MED GY IP 250 OP 250 PS 637

## 2022-05-23 PROCEDURE — 84100 ASSAY OF PHOSPHORUS: CPT | Performed by: STUDENT IN AN ORGANIZED HEALTH CARE EDUCATION/TRAINING PROGRAM

## 2022-05-23 PROCEDURE — 85652 RBC SED RATE AUTOMATED: CPT | Performed by: STUDENT IN AN ORGANIZED HEALTH CARE EDUCATION/TRAINING PROGRAM

## 2022-05-23 PROCEDURE — 83735 ASSAY OF MAGNESIUM: CPT | Performed by: STUDENT IN AN ORGANIZED HEALTH CARE EDUCATION/TRAINING PROGRAM

## 2022-05-23 PROCEDURE — 82248 BILIRUBIN DIRECT: CPT | Performed by: STUDENT IN AN ORGANIZED HEALTH CARE EDUCATION/TRAINING PROGRAM

## 2022-05-23 PROCEDURE — 250N000011 HC RX IP 250 OP 636: Performed by: STUDENT IN AN ORGANIZED HEALTH CARE EDUCATION/TRAINING PROGRAM

## 2022-05-23 PROCEDURE — 80069 RENAL FUNCTION PANEL: CPT | Performed by: STUDENT IN AN ORGANIZED HEALTH CARE EDUCATION/TRAINING PROGRAM

## 2022-05-23 PROCEDURE — 36415 COLL VENOUS BLD VENIPUNCTURE: CPT | Performed by: STUDENT IN AN ORGANIZED HEALTH CARE EDUCATION/TRAINING PROGRAM

## 2022-05-23 RX ORDER — AMINOCAPROIC ACID 0.25 G/ML
2.5 SYRUP ORAL
Status: DISPENSED | OUTPATIENT
Start: 2022-05-23 | End: 2022-05-23

## 2022-05-23 RX ADMIN — AMPICILLIN SODIUM AND SULBACTAM SODIUM 3 G: 2; 1 INJECTION, POWDER, FOR SOLUTION INTRAMUSCULAR; INTRAVENOUS at 00:14

## 2022-05-23 RX ADMIN — Medication 1 PACKET: at 20:13

## 2022-05-23 RX ADMIN — FLUCONAZOLE 400 MG: 200 TABLET ORAL at 11:45

## 2022-05-23 RX ADMIN — ACETAMINOPHEN 325MG 650 MG: 325 TABLET ORAL at 18:41

## 2022-05-23 RX ADMIN — AMPICILLIN SODIUM AND SULBACTAM SODIUM 3 G: 2; 1 INJECTION, POWDER, FOR SOLUTION INTRAMUSCULAR; INTRAVENOUS at 11:36

## 2022-05-23 RX ADMIN — AMPICILLIN SODIUM AND SULBACTAM SODIUM 3 G: 2; 1 INJECTION, POWDER, FOR SOLUTION INTRAMUSCULAR; INTRAVENOUS at 06:28

## 2022-05-23 RX ADMIN — Medication 2 PACKET: at 11:31

## 2022-05-23 RX ADMIN — HYDROXYZINE HYDROCHLORIDE 25 MG: 10 SOLUTION ORAL at 00:14

## 2022-05-23 RX ADMIN — HYDROXYZINE HYDROCHLORIDE 25 MG: 10 SOLUTION ORAL at 11:56

## 2022-05-23 RX ADMIN — Medication 1 PACKET: at 12:01

## 2022-05-23 RX ADMIN — ACETAMINOPHEN 325MG 650 MG: 325 TABLET ORAL at 00:14

## 2022-05-23 RX ADMIN — AMINOCAPROIC ACID 2.5 G: 0.25 SOLUTION ORAL at 18:34

## 2022-05-23 RX ADMIN — Medication 1 PACKET: at 00:14

## 2022-05-23 RX ADMIN — ACETAMINOPHEN 325MG 650 MG: 325 TABLET ORAL at 11:55

## 2022-05-23 RX ADMIN — AMINOCAPROIC ACID 2.5 G: 0.25 SOLUTION ORAL at 15:58

## 2022-05-23 RX ADMIN — WHITE PETROLATUM: 1.75 OINTMENT TOPICAL at 20:16

## 2022-05-23 RX ADMIN — HYDROXYZINE HYDROCHLORIDE 25 MG: 10 SOLUTION ORAL at 21:48

## 2022-05-23 RX ADMIN — Medication 2 PACKET: at 20:10

## 2022-05-23 RX ADMIN — WHITE PETROLATUM: 1.75 OINTMENT TOPICAL at 13:00

## 2022-05-23 RX ADMIN — AMPICILLIN SODIUM AND SULBACTAM SODIUM 3 G: 2; 1 INJECTION, POWDER, FOR SOLUTION INTRAMUSCULAR; INTRAVENOUS at 18:31

## 2022-05-23 RX ADMIN — AMINOCAPROIC ACID 2.5 G: 0.25 SOLUTION ORAL at 20:08

## 2022-05-23 RX ADMIN — CYCLOBENZAPRINE HYDROCHLORIDE 5 MG: 5 TABLET, FILM COATED ORAL at 12:50

## 2022-05-23 RX ADMIN — CYCLOBENZAPRINE HYDROCHLORIDE 5 MG: 5 TABLET, FILM COATED ORAL at 02:07

## 2022-05-23 RX ADMIN — Medication 1 PACKET: at 16:16

## 2022-05-23 RX ADMIN — AMINOCAPROIC ACID 2.5 G: 0.25 SOLUTION ORAL at 21:45

## 2022-05-23 ASSESSMENT — ACTIVITIES OF DAILY LIVING (ADL)
ADLS_ACUITY_SCORE: 20

## 2022-05-23 NOTE — PROGRESS NOTES
"Otolaryngology Progress Note  May 23, 2022    SUBJECTIVE: No acute events overnight. Yesterday penrose drain in anterior neck was exchanged for some gauze packing. Patient tolerated this well. Heparin was stopped at 2am in preparation for tooth extraction this morning. Patient has not concerns at this time.     OBJECTIVE:   /55 (BP Location: Right arm)   Pulse 89   Temp 97.5  F (36.4  C) (Oral)   Resp 16   Ht 1.9 m (6' 2.8\")   Wt 79.2 kg (174 lb 9.6 oz)   SpO2 100%   BMI 21.94 kg/m     General: Alert and oriented x 3, No acute distress, laying comfortably in bed   HEENT: Midline neck wound with blood soaked gauze packing. Chest incision sutures intact, clean. No surrounding skin changes or erythema.    Pulmonary: Breathing non-labored, no stridor, no accessory muscle use.    LABS:  ROUTINE IP LABS (Last four results)  BMP  Recent Labs   Lab 05/23/22  0803 05/23/22  0622 05/23/22  0445 05/23/22  0012 05/22/22  1402 05/22/22  0730 05/21/22  0814 05/21/22  0717 05/20/22  1237 05/20/22  0852   NA  --  135  --   --   --  136  --  137  --  135   POTASSIUM  --  4.0  --   --   --  3.9  --  3.9  --  3.9   CHLORIDE  --  102  --   --   --  101  --  103  --  102   OLYA  --  9.6  --   --   --  9.2  --  9.5  --  9.6   CO2  --  25  --   --   --  27  --  25  --  26   BUN  --  14  --   --   --  15  --  16  --  17   CR  --  0.81  --   --   --  0.74  --  0.86  --  0.80   GLC 93 87 90 105*   < > 88   < > 100*   < > 86    < > = values in this interval not displayed.     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 Midline neck incision partially closed at bedside on 5/21, Penrose removed today and saline-soaked kerlix packed lightly.    Neuro:  - Pain control    HEENT:  - Saline-soaked kerlix dressing changes. Do not need to pack aggressively, stop with any resistance.    Respiratory:  - supplemental O2 PRN to keep sats >92%    CV/heme:  - hemodynamically stable  - Heparin gtt for internal jugular thrombus per Heme, held at midnight for tooth extraction today  - Plan for resumption of Eliquis tomorrow 5/24 and monitor for bleeding from tooth extraction sites.     FEN/GI:  - Full liquid Diet, G-tube  - NPO per anesthesia guidelines at midnight, resume normal diet today as tolerated  - Bowel regimen  - Speech to repeat swallow evaluation later today    :  - voiding independently    ID:  - ID recommending IV unasyn while inpatient and Augmentin on discharge until at least 5/27 with outpatient follow-up and repeat imaging at that time to determine need for continuation    PPX:  - SCDs  - IS    Dispo: Pending convalescence    -- Patient and above plan discussed with Dr Margarita Blackburn MD  ENT Resident  Please page ENT on call with questions or concerns

## 2022-05-23 NOTE — PLAN OF CARE
Status: Admitted 5/4 for necrotizing soft tissue infection. S/p thoracotomy, G-tube and I&D of pharyngeal abscess 5/4, exploration of neck 5/13.  Vitals: VSS on RA  Neuros: Intact  IV: PIV x2, heparin gtt stopped this AM at 0200. PIV SL between abx.   Labs/Electrolytes: WNL. BG checks  Resp/trach: Infrequent cough. No report of bleeding from tooth extractions.   Diet: Full liquid diet liquidized/moderately thick (level 3). Tolerated moderately thick drinks in evening. Bolus tube feeding, had 7/7 cans on 5/22. Pt has had ~20% weight drop since 5/11 (down to 174 lbs from 216 lbs). Continue to educate pt on importance of adequate nutrition and on completing all bolus feeds. Pt open to education and agreeable. Pt has been NPO since midnight.   Bowel status: LBM 5/22. BS+   : Voiding spont  Skin: Site from old R chest tube covered with primapore, changed this shift, scant purulent drainage. Left chest tube site HIRAM. Neck wound sutured with packing in place per orders, moderate bloody drainage. Surgilast with 4x4 in place - replaced this shift. R chest wound sutured, covered with 4x4/tape per patient request for comfort  Pain: PRN tylenol, atarax and flexeril given for pain/comfort this shift   Activity: Up ad william, pt walked with RN this shift in halls.   Plan: Plan for teeth extraction today, time tbd.

## 2022-05-24 ENCOUNTER — APPOINTMENT (OUTPATIENT)
Dept: SPEECH THERAPY | Facility: CLINIC | Age: 27
End: 2022-05-24
Payer: COMMERCIAL

## 2022-05-24 LAB
ANION GAP SERPL CALCULATED.3IONS-SCNC: 7 MMOL/L (ref 3–14)
BASOPHILS # BLD AUTO: 0 10E3/UL (ref 0–0.2)
BASOPHILS NFR BLD AUTO: 1 %
BUN SERPL-MCNC: 13 MG/DL (ref 7–30)
CALCIUM SERPL-MCNC: 9.2 MG/DL (ref 8.5–10.1)
CHLORIDE BLD-SCNC: 103 MMOL/L (ref 94–109)
CO2 SERPL-SCNC: 29 MMOL/L (ref 20–32)
CREAT SERPL-MCNC: 0.89 MG/DL (ref 0.66–1.25)
CRP SERPL-MCNC: 10 MG/L (ref 0–8)
EOSINOPHIL # BLD AUTO: 0.1 10E3/UL (ref 0–0.7)
EOSINOPHIL NFR BLD AUTO: 3 %
ERYTHROCYTE [DISTWIDTH] IN BLOOD BY AUTOMATED COUNT: 14.2 % (ref 10–15)
ERYTHROCYTE [DISTWIDTH] IN BLOOD BY AUTOMATED COUNT: 14.2 % (ref 10–15)
ERYTHROCYTE [SEDIMENTATION RATE] IN BLOOD BY WESTERGREN METHOD: 90 MM/HR (ref 0–15)
GFR SERPL CREATININE-BSD FRML MDRD: >90 ML/MIN/1.73M2
GLUCOSE BLD-MCNC: 86 MG/DL (ref 70–99)
GLUCOSE BLDC GLUCOMTR-MCNC: 83 MG/DL (ref 70–99)
GLUCOSE BLDC GLUCOMTR-MCNC: 85 MG/DL (ref 70–99)
GLUCOSE BLDC GLUCOMTR-MCNC: 87 MG/DL (ref 70–99)
GLUCOSE BLDC GLUCOMTR-MCNC: 89 MG/DL (ref 70–99)
GLUCOSE BLDC GLUCOMTR-MCNC: 89 MG/DL (ref 70–99)
GLUCOSE BLDC GLUCOMTR-MCNC: 91 MG/DL (ref 70–99)
GLUCOSE BLDC GLUCOMTR-MCNC: 92 MG/DL (ref 70–99)
HCT VFR BLD AUTO: 31.8 % (ref 40–53)
HCT VFR BLD AUTO: 32.3 % (ref 40–53)
HGB BLD-MCNC: 10.1 G/DL (ref 13.3–17.7)
HGB BLD-MCNC: 10.1 G/DL (ref 13.3–17.7)
IMM GRANULOCYTES # BLD: 0 10E3/UL
IMM GRANULOCYTES NFR BLD: 0 %
LYMPHOCYTES # BLD AUTO: 0.9 10E3/UL (ref 0.8–5.3)
LYMPHOCYTES NFR BLD AUTO: 27 %
MAGNESIUM SERPL-MCNC: 2.1 MG/DL (ref 1.6–2.3)
MCH RBC QN AUTO: 29.6 PG (ref 26.5–33)
MCH RBC QN AUTO: 29.8 PG (ref 26.5–33)
MCHC RBC AUTO-ENTMCNC: 31.3 G/DL (ref 31.5–36.5)
MCHC RBC AUTO-ENTMCNC: 31.8 G/DL (ref 31.5–36.5)
MCV RBC AUTO: 94 FL (ref 78–100)
MCV RBC AUTO: 95 FL (ref 78–100)
MONOCYTES # BLD AUTO: 0.4 10E3/UL (ref 0–1.3)
MONOCYTES NFR BLD AUTO: 11 %
NEUTROPHILS # BLD AUTO: 2 10E3/UL (ref 1.6–8.3)
NEUTROPHILS NFR BLD AUTO: 58 %
NRBC # BLD AUTO: 0 10E3/UL
NRBC BLD AUTO-RTO: 0 /100
PHOSPHATE SERPL-MCNC: 4.7 MG/DL (ref 2.5–4.5)
PLATELET # BLD AUTO: 321 10E3/UL (ref 150–450)
PLATELET # BLD AUTO: 328 10E3/UL (ref 150–450)
POTASSIUM BLD-SCNC: 4 MMOL/L (ref 3.4–5.3)
RBC # BLD AUTO: 3.39 10E6/UL (ref 4.4–5.9)
RBC # BLD AUTO: 3.41 10E6/UL (ref 4.4–5.9)
SODIUM SERPL-SCNC: 139 MMOL/L (ref 133–144)
WBC # BLD AUTO: 3.3 10E3/UL (ref 4–11)
WBC # BLD AUTO: 3.4 10E3/UL (ref 4–11)

## 2022-05-24 PROCEDURE — 36415 COLL VENOUS BLD VENIPUNCTURE: CPT | Performed by: PHYSICIAN ASSISTANT

## 2022-05-24 PROCEDURE — 84100 ASSAY OF PHOSPHORUS: CPT | Performed by: STUDENT IN AN ORGANIZED HEALTH CARE EDUCATION/TRAINING PROGRAM

## 2022-05-24 PROCEDURE — 85018 HEMOGLOBIN: CPT | Performed by: STUDENT IN AN ORGANIZED HEALTH CARE EDUCATION/TRAINING PROGRAM

## 2022-05-24 PROCEDURE — 85652 RBC SED RATE AUTOMATED: CPT | Performed by: STUDENT IN AN ORGANIZED HEALTH CARE EDUCATION/TRAINING PROGRAM

## 2022-05-24 PROCEDURE — 36415 COLL VENOUS BLD VENIPUNCTURE: CPT | Performed by: STUDENT IN AN ORGANIZED HEALTH CARE EDUCATION/TRAINING PROGRAM

## 2022-05-24 PROCEDURE — 83735 ASSAY OF MAGNESIUM: CPT | Performed by: STUDENT IN AN ORGANIZED HEALTH CARE EDUCATION/TRAINING PROGRAM

## 2022-05-24 PROCEDURE — 92526 ORAL FUNCTION THERAPY: CPT | Mod: GN | Performed by: SPEECH-LANGUAGE PATHOLOGIST

## 2022-05-24 PROCEDURE — 250N000013 HC RX MED GY IP 250 OP 250 PS 637: Performed by: STUDENT IN AN ORGANIZED HEALTH CARE EDUCATION/TRAINING PROGRAM

## 2022-05-24 PROCEDURE — 250N000013 HC RX MED GY IP 250 OP 250 PS 637

## 2022-05-24 PROCEDURE — 120N000002 HC R&B MED SURG/OB UMMC

## 2022-05-24 PROCEDURE — 250N000011 HC RX IP 250 OP 636: Performed by: STUDENT IN AN ORGANIZED HEALTH CARE EDUCATION/TRAINING PROGRAM

## 2022-05-24 PROCEDURE — 80048 BASIC METABOLIC PNL TOTAL CA: CPT | Performed by: STUDENT IN AN ORGANIZED HEALTH CARE EDUCATION/TRAINING PROGRAM

## 2022-05-24 PROCEDURE — 85027 COMPLETE CBC AUTOMATED: CPT | Performed by: PHYSICIAN ASSISTANT

## 2022-05-24 PROCEDURE — 86140 C-REACTIVE PROTEIN: CPT | Performed by: STUDENT IN AN ORGANIZED HEALTH CARE EDUCATION/TRAINING PROGRAM

## 2022-05-24 RX ADMIN — AMPICILLIN SODIUM AND SULBACTAM SODIUM 3 G: 2; 1 INJECTION, POWDER, FOR SOLUTION INTRAMUSCULAR; INTRAVENOUS at 06:10

## 2022-05-24 RX ADMIN — WHITE PETROLATUM: 1.75 OINTMENT TOPICAL at 20:51

## 2022-05-24 RX ADMIN — Medication 1 PACKET: at 08:44

## 2022-05-24 RX ADMIN — ACETAMINOPHEN 325MG 650 MG: 325 TABLET ORAL at 00:31

## 2022-05-24 RX ADMIN — WHITE PETROLATUM: 1.75 OINTMENT TOPICAL at 08:05

## 2022-05-24 RX ADMIN — Medication 1 PACKET: at 11:45

## 2022-05-24 RX ADMIN — AMPICILLIN SODIUM AND SULBACTAM SODIUM 3 G: 2; 1 INJECTION, POWDER, FOR SOLUTION INTRAMUSCULAR; INTRAVENOUS at 00:32

## 2022-05-24 RX ADMIN — Medication 1 PACKET: at 20:37

## 2022-05-24 RX ADMIN — Medication 2 PACKET: at 08:44

## 2022-05-24 RX ADMIN — ACETAMINOPHEN 325MG 650 MG: 325 TABLET ORAL at 11:51

## 2022-05-24 RX ADMIN — AMPICILLIN SODIUM AND SULBACTAM SODIUM 3 G: 2; 1 INJECTION, POWDER, FOR SOLUTION INTRAMUSCULAR; INTRAVENOUS at 23:30

## 2022-05-24 RX ADMIN — FLUCONAZOLE 400 MG: 200 TABLET ORAL at 08:00

## 2022-05-24 RX ADMIN — WHITE PETROLATUM: 1.75 OINTMENT TOPICAL at 13:39

## 2022-05-24 RX ADMIN — HYDROXYZINE HYDROCHLORIDE 25 MG: 10 SOLUTION ORAL at 20:34

## 2022-05-24 RX ADMIN — Medication 1 PACKET: at 15:41

## 2022-05-24 RX ADMIN — APIXABAN 10 MG: 5 TABLET, FILM COATED ORAL at 11:45

## 2022-05-24 RX ADMIN — APIXABAN 10 MG: 5 TABLET, FILM COATED ORAL at 20:34

## 2022-05-24 RX ADMIN — AMPICILLIN SODIUM AND SULBACTAM SODIUM 3 G: 2; 1 INJECTION, POWDER, FOR SOLUTION INTRAMUSCULAR; INTRAVENOUS at 17:18

## 2022-05-24 RX ADMIN — Medication 2 PACKET: at 20:36

## 2022-05-24 RX ADMIN — AMPICILLIN SODIUM AND SULBACTAM SODIUM 3 G: 2; 1 INJECTION, POWDER, FOR SOLUTION INTRAMUSCULAR; INTRAVENOUS at 11:45

## 2022-05-24 RX ADMIN — CYCLOBENZAPRINE HYDROCHLORIDE 5 MG: 5 TABLET, FILM COATED ORAL at 17:21

## 2022-05-24 ASSESSMENT — ACTIVITIES OF DAILY LIVING (ADL)
ADLS_ACUITY_SCORE: 20

## 2022-05-24 NOTE — PLAN OF CARE
Status: Admitted 5/4 for necrotizing soft tissue infection. S/p thoracotomy, G-tube and I&D of pharyngeal abscess 5/4, exploration of neck 5/13. Multiple tooth extraction today 5/23 at [facility]   Vitals: VSS on RA  Neuros: Intact. Sleeping between cares.  IV: PIV SL between IV abx.   Resp/trach: Small amt ari red blood from mouth after oral surgery  Diet: Full liquid diet liquidized/moderately thick (level 3). Bolus TF 7 cans/day; completed 3.5 today d/t procedure. Ate tomato soupx2 for dinner with good intake  Bowel status: LBM 5/22  : Voiding spnt  Skin: Site from old R chest tube covered with primapore CDI, old L chest tube site HIRAM. Anterior neck dressing in place, pt pulled out packing and it was saturated in sanguineous blood; ok from MD to repack with NS soaked krilex until they see pt. R chest wound sutured, scant drainage; aquaphor applied, gauze changed per pt request  Pain: PRN tylenol and atarax used with improvement of mouth pain.  Activity: Up ad william   Plan: Continue to encourage activity and intake as tolerated

## 2022-05-24 NOTE — PLAN OF CARE
Status: Admitted 5/4 for necrotizing soft tissue infection. S/p thoracotomy, G-tube and I&D of pharyngeal abscess 5/4, exploration of neck 5/13. 7 teeth extracted 5/23 at [facility]   Vitals: VSS on RA  Neuros: Intact.  IV: PIV SL between IV abx.   Resp/trach: Small amt ari red blood from mouth after oral surgery  Diet: Full liquid diet liquidized/moderately thick (level 3). Bolus TF goal 7 cans/day; encourage pt to have all 7 cans, also encourage pt to run boluses slower to decrease nausea, sick feeling.   Bowel status: LBM 5/22  : Voiding spnt  Skin: Site from old R chest tube covered with primapore CDI, old L chest tube site HIRAM. Anterior neck dressing in place, ENT changed packing this AM, check orders for re-packing. R chest wound sutured, gauze in place per patient request.  Pain: PRN tylenol used for mouth pain/soreness.   Activity: Up ad william   Plan: Continue to encourage activity and intake as tolerated. Plan to have more teeth extracted in future.

## 2022-05-24 NOTE — PLAN OF CARE
Status: Admitted 5/4 for necrotizing soft tissue infection. S/p thoracotomy, G-tube and I&D of pharyngeal abscess 5/4, exploration of neck 5/13. 7 teeth extracted 5/23 at [facility]   Vitals: VSS on RA  Neuros: Intact.  IV: PIV SL between IV abx.   Resp/trach: Small amt ari red blood from mouth after oral surgery  Diet: Pureed diet, moderately thick liquids. On david counts. Eating all of his meals. Tolerating well. Orders to hold tube feeding. Pt did receive 2 cans this am prior to orders being placed.  Bowel status: LBM today  : Voiding spontaneously  Skin: Site from old R chest tube covered with primapore CDI, old L chest tube site HIRAM. Anterior neck dressing in place, ENT changed packing this AM, check orders for re-packing. R chest wound sutured, gauze in place per patient request.  Pain: PRN tylenol used for mouth pain/soreness.   Activity: Up ad william   Plan: Continue to encourage activity and intake as tolerated. Plan to have more teeth extracted in future. PEG education completed this shift.

## 2022-05-24 NOTE — PROGRESS NOTES
We went to see this patient but he was not in his room.  I talked to the team. He had no major bleeding post procedure.  ENT team plans to switch him to either Eliquis or Xarelto which are both reasonable options. I talked to the team regarding the appropriate doses of these medications. Please monitor patient for bleeding.  Hematology will sign off, please contact on call fellow if any questions.  Plan was discussed with Dr. dang

## 2022-05-24 NOTE — PROGRESS NOTES
"Otolaryngology Progress Note  May 24, 2022    SUBJECTIVE: No acute events, afebrile, WBC and CRP remain low. Yesterday teeth pulled, patient said he had some pain last night but is feeling better this morning. Has not noticed any significant bleeding from mouth.     OBJECTIVE:   /70 (BP Location: Left arm)   Pulse 86   Temp 99.2  F (37.3  C) (Axillary)   Resp 17   Ht 1.9 m (6' 2.8\")   Wt 77.2 kg (170 lb 3.2 oz)   SpO2 99%   BMI 21.39 kg/m     General: Alert and oriented x 3, No acute distress, laying comfortably in bed   HEENT: Midline neck wound with blood soaked gauze packing. Chest incision sutures intact, clean. No surrounding skin changes or erythema. Empty sockets within oral cavity, some suture visible around extracted teeth.    Pulmonary: Breathing non-labored, no stridor, no accessory muscle use.    LABS:  ROUTINE IP LABS (Last four results)  BMP  Recent Labs   Lab 05/24/22  0408 05/24/22  0025 05/23/22 2021 05/23/22  1606 05/23/22  0803 05/23/22  0622 05/22/22  1402 05/22/22  0730 05/21/22  0814 05/21/22  0717 05/20/22  1237 05/20/22  0852   NA  --   --   --   --   --  135  --  136  --  137  --  135   POTASSIUM  --   --   --   --   --  4.0  --  3.9  --  3.9  --  3.9   CHLORIDE  --   --   --   --   --  102  --  101  --  103  --  102   OLYA  --   --   --   --   --  9.6  --  9.2  --  9.5  --  9.6   CO2  --   --   --   --   --  25  --  27  --  25  --  26   BUN  --   --   --   --   --  14  --  15  --  16  --  17   CR  --   --   --   --   --  0.81  --  0.74  --  0.86  --  0.80   GLC 83 89 100* 85   < > 87   < > 88   < > 100*   < > 86    < > = values in this interval not displayed.     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 Midline neck incision partially closed at bedside on 5/21. Seven teeth extracted on 5/23, with plan for further extraction in next few months.     Neuro:  - Pain control    HEENT:  - Saline-soaked kerlix dressing changes. Do not need to pack aggressively, stop with any resistance.    Respiratory:  - supplemental O2 PRN to keep sats >92%    CV/heme:  - hemodynamically stable  - Heparin gtt for internal jugular thrombus per Heme, held at midnight for tooth extraction today  - Resume apixaban, watch for oral bleeding.   - Amicar swishes started yesterday.     FEN/GI:  - Full liquid Diet, G-tube, speech swallow evaluation today  - Encourage PO today  - Bowel regimen    :  - voiding independently    ID:  - ID recommending IV unasyn while inpatient and Augmentin on discharge until at least 5/27 with outpatient follow-up and repeat imaging at that time to determine need for continuation    PPX:  - SCDs  - IS    Dispo: Pending convalescence    -- Patient and above plan to be discussed with Dr Margarita Blackburn MD  ENT Resident  Please page ENT on call with questions or concerns

## 2022-05-25 ENCOUNTER — APPOINTMENT (OUTPATIENT)
Dept: CT IMAGING | Facility: CLINIC | Age: 27
End: 2022-05-25
Payer: COMMERCIAL

## 2022-05-25 ENCOUNTER — APPOINTMENT (OUTPATIENT)
Dept: SPEECH THERAPY | Facility: CLINIC | Age: 27
End: 2022-05-25
Payer: COMMERCIAL

## 2022-05-25 LAB
ALBUMIN SERPL-MCNC: 2.8 G/DL (ref 3.4–5)
ALP SERPL-CCNC: 122 U/L (ref 40–150)
ALT SERPL W P-5'-P-CCNC: 75 U/L (ref 0–70)
ANION GAP SERPL CALCULATED.3IONS-SCNC: 7 MMOL/L (ref 3–14)
AST SERPL W P-5'-P-CCNC: 52 U/L (ref 0–45)
BILIRUB DIRECT SERPL-MCNC: 0.2 MG/DL (ref 0–0.2)
BILIRUB SERPL-MCNC: 0.4 MG/DL (ref 0.2–1.3)
BUN SERPL-MCNC: 14 MG/DL (ref 7–30)
CALCIUM SERPL-MCNC: 9.3 MG/DL (ref 8.5–10.1)
CHLORIDE BLD-SCNC: 103 MMOL/L (ref 94–109)
CO2 SERPL-SCNC: 26 MMOL/L (ref 20–32)
CREAT SERPL-MCNC: 0.87 MG/DL (ref 0.66–1.25)
CRP SERPL-MCNC: 8.3 MG/L (ref 0–8)
ERYTHROCYTE [DISTWIDTH] IN BLOOD BY AUTOMATED COUNT: 14.4 % (ref 10–15)
ERYTHROCYTE [SEDIMENTATION RATE] IN BLOOD BY WESTERGREN METHOD: 83 MM/HR (ref 0–15)
GFR SERPL CREATININE-BSD FRML MDRD: >90 ML/MIN/1.73M2
GLUCOSE BLD-MCNC: 84 MG/DL (ref 70–99)
GLUCOSE BLDC GLUCOMTR-MCNC: 105 MG/DL (ref 70–99)
GLUCOSE BLDC GLUCOMTR-MCNC: 169 MG/DL (ref 70–99)
GLUCOSE BLDC GLUCOMTR-MCNC: 86 MG/DL (ref 70–99)
GLUCOSE BLDC GLUCOMTR-MCNC: 93 MG/DL (ref 70–99)
HCT VFR BLD AUTO: 31.9 % (ref 40–53)
HGB BLD-MCNC: 10.5 G/DL (ref 13.3–17.7)
MAGNESIUM SERPL-MCNC: 2.1 MG/DL (ref 1.6–2.3)
MCH RBC QN AUTO: 30.3 PG (ref 26.5–33)
MCHC RBC AUTO-ENTMCNC: 32.9 G/DL (ref 31.5–36.5)
MCV RBC AUTO: 92 FL (ref 78–100)
PHOSPHATE SERPL-MCNC: 5 MG/DL (ref 2.5–4.5)
PLATELET # BLD AUTO: 308 10E3/UL (ref 150–450)
POTASSIUM BLD-SCNC: 3.8 MMOL/L (ref 3.4–5.3)
PROT SERPL-MCNC: 8.7 G/DL (ref 6.8–8.8)
RBC # BLD AUTO: 3.46 10E6/UL (ref 4.4–5.9)
SODIUM SERPL-SCNC: 136 MMOL/L (ref 133–144)
WBC # BLD AUTO: 3.4 10E3/UL (ref 4–11)

## 2022-05-25 PROCEDURE — 70491 CT SOFT TISSUE NECK W/DYE: CPT

## 2022-05-25 PROCEDURE — 250N000011 HC RX IP 250 OP 636: Performed by: STUDENT IN AN ORGANIZED HEALTH CARE EDUCATION/TRAINING PROGRAM

## 2022-05-25 PROCEDURE — 80053 COMPREHEN METABOLIC PANEL: CPT | Performed by: STUDENT IN AN ORGANIZED HEALTH CARE EDUCATION/TRAINING PROGRAM

## 2022-05-25 PROCEDURE — 84100 ASSAY OF PHOSPHORUS: CPT | Performed by: STUDENT IN AN ORGANIZED HEALTH CARE EDUCATION/TRAINING PROGRAM

## 2022-05-25 PROCEDURE — 120N000002 HC R&B MED SURG/OB UMMC

## 2022-05-25 PROCEDURE — 71260 CT THORAX DX C+: CPT | Mod: 26 | Performed by: RADIOLOGY

## 2022-05-25 PROCEDURE — 86140 C-REACTIVE PROTEIN: CPT | Performed by: STUDENT IN AN ORGANIZED HEALTH CARE EDUCATION/TRAINING PROGRAM

## 2022-05-25 PROCEDURE — 36415 COLL VENOUS BLD VENIPUNCTURE: CPT | Performed by: STUDENT IN AN ORGANIZED HEALTH CARE EDUCATION/TRAINING PROGRAM

## 2022-05-25 PROCEDURE — 83735 ASSAY OF MAGNESIUM: CPT | Performed by: STUDENT IN AN ORGANIZED HEALTH CARE EDUCATION/TRAINING PROGRAM

## 2022-05-25 PROCEDURE — 250N000011 HC RX IP 250 OP 636: Performed by: OTOLARYNGOLOGY

## 2022-05-25 PROCEDURE — 92526 ORAL FUNCTION THERAPY: CPT | Mod: GN

## 2022-05-25 PROCEDURE — 250N000013 HC RX MED GY IP 250 OP 250 PS 637: Performed by: STUDENT IN AN ORGANIZED HEALTH CARE EDUCATION/TRAINING PROGRAM

## 2022-05-25 PROCEDURE — 82248 BILIRUBIN DIRECT: CPT | Performed by: STUDENT IN AN ORGANIZED HEALTH CARE EDUCATION/TRAINING PROGRAM

## 2022-05-25 PROCEDURE — 70491 CT SOFT TISSUE NECK W/DYE: CPT | Mod: 26 | Performed by: STUDENT IN AN ORGANIZED HEALTH CARE EDUCATION/TRAINING PROGRAM

## 2022-05-25 PROCEDURE — 85652 RBC SED RATE AUTOMATED: CPT | Performed by: STUDENT IN AN ORGANIZED HEALTH CARE EDUCATION/TRAINING PROGRAM

## 2022-05-25 PROCEDURE — 250N000013 HC RX MED GY IP 250 OP 250 PS 637

## 2022-05-25 PROCEDURE — 85041 AUTOMATED RBC COUNT: CPT | Performed by: STUDENT IN AN ORGANIZED HEALTH CARE EDUCATION/TRAINING PROGRAM

## 2022-05-25 PROCEDURE — 71260 CT THORAX DX C+: CPT

## 2022-05-25 RX ORDER — IOPAMIDOL 755 MG/ML
100 INJECTION, SOLUTION INTRAVASCULAR ONCE
Status: COMPLETED | OUTPATIENT
Start: 2022-05-25 | End: 2022-05-25

## 2022-05-25 RX ADMIN — ACETAMINOPHEN 325MG 650 MG: 325 TABLET ORAL at 18:21

## 2022-05-25 RX ADMIN — APIXABAN 10 MG: 5 TABLET, FILM COATED ORAL at 20:30

## 2022-05-25 RX ADMIN — HYDROXYZINE HYDROCHLORIDE 25 MG: 10 SOLUTION ORAL at 18:25

## 2022-05-25 RX ADMIN — WHITE PETROLATUM: 1.75 OINTMENT TOPICAL at 09:49

## 2022-05-25 RX ADMIN — AMPICILLIN SODIUM AND SULBACTAM SODIUM 3 G: 2; 1 INJECTION, POWDER, FOR SOLUTION INTRAMUSCULAR; INTRAVENOUS at 11:59

## 2022-05-25 RX ADMIN — APIXABAN 10 MG: 5 TABLET, FILM COATED ORAL at 09:43

## 2022-05-25 RX ADMIN — AMPICILLIN SODIUM AND SULBACTAM SODIUM 3 G: 2; 1 INJECTION, POWDER, FOR SOLUTION INTRAMUSCULAR; INTRAVENOUS at 18:18

## 2022-05-25 RX ADMIN — CYCLOBENZAPRINE HYDROCHLORIDE 5 MG: 5 TABLET, FILM COATED ORAL at 20:36

## 2022-05-25 RX ADMIN — Medication 2 PACKET: at 20:31

## 2022-05-25 RX ADMIN — ACETAMINOPHEN 325MG 650 MG: 325 TABLET ORAL at 09:43

## 2022-05-25 RX ADMIN — AMPICILLIN SODIUM AND SULBACTAM SODIUM 3 G: 2; 1 INJECTION, POWDER, FOR SOLUTION INTRAMUSCULAR; INTRAVENOUS at 06:17

## 2022-05-25 RX ADMIN — Medication 1 PACKET: at 20:31

## 2022-05-25 RX ADMIN — FLUCONAZOLE 400 MG: 200 TABLET ORAL at 09:43

## 2022-05-25 RX ADMIN — Medication 1 PACKET: at 16:00

## 2022-05-25 RX ADMIN — CYCLOBENZAPRINE HYDROCHLORIDE 5 MG: 5 TABLET, FILM COATED ORAL at 12:39

## 2022-05-25 RX ADMIN — AMPICILLIN SODIUM AND SULBACTAM SODIUM 3 G: 2; 1 INJECTION, POWDER, FOR SOLUTION INTRAMUSCULAR; INTRAVENOUS at 23:35

## 2022-05-25 RX ADMIN — ACETAMINOPHEN 325MG 650 MG: 325 TABLET ORAL at 14:22

## 2022-05-25 RX ADMIN — IOPAMIDOL 100 ML: 755 INJECTION, SOLUTION INTRAVENOUS at 11:25

## 2022-05-25 RX ADMIN — WHITE PETROLATUM: 1.75 OINTMENT TOPICAL at 20:31

## 2022-05-25 RX ADMIN — Medication 1 PACKET: at 11:59

## 2022-05-25 RX ADMIN — WHITE PETROLATUM: 1.75 OINTMENT TOPICAL at 14:22

## 2022-05-25 RX ADMIN — Medication 2 PACKET: at 11:58

## 2022-05-25 ASSESSMENT — ACTIVITIES OF DAILY LIVING (ADL)
ADLS_ACUITY_SCORE: 20

## 2022-05-25 NOTE — PLAN OF CARE
"/61 (BP Location: Left arm)   Pulse 72   Temp 98.7  F (37.1  C) (Oral)   Resp 19   Ht 1.9 m (6' 2.8\")   Wt 77.2 kg (170 lb 3.2 oz)   SpO2 98%   BMI 21.39 kg/m       Status: Necrotizing soft tissue infection - thoracotomy, G-tube, and I&D of pharyngeal abscess  Neuros: A/Ox4, calls appropriately, denies n/t  IV: R PIV x 2 - SL  Labs/Electrolytes: BG - 92, 87, 86  Resp/trach: RA, sating upper 90s, denies SOB  Diet: Pureed diet w/moderately thick liquids - david counts  Bowel status: No BM on shift  : Voiding spontaneously w/o difficulty  Skin: Anterior neck dressing in place; R chest wound - sutured, dressing changed; L chest tube site - HIRAM w/o drainage; R chest tube site - dressing changed; PEG tube - Dressing changed  Pain: Mild pain managed with non-pharmacologic interventions  Activity: Up ad william  Plan: Encourage oral intake; proceed with POC      "

## 2022-05-25 NOTE — PROGRESS NOTES
"Otolaryngology Progress Note  May 25, 2022    SUBJECTIVE: No acute events, afebrile, eating 100% of PO full liquid diet, tolerating well.     OBJECTIVE:   /61 (BP Location: Left arm)   Pulse 72   Temp 98.7  F (37.1  C) (Oral)   Resp 19   Ht 1.9 m (6' 2.8\")   Wt 77.2 kg (170 lb 3.2 oz)   SpO2 98%   BMI 21.39 kg/m     General: Alert and oriented x 3, No acute distress, laying comfortably in bed   HEENT: Midline neck wound with blood soaked gauze packing, removed and replaced with saline soaked kerlix. Chest incision sutures intact, clean. No surrounding skin changes or erythema. Dental extraction sites healing with absorbable suture.   Pulmonary: Breathing non-labored, no stridor, no accessory muscle use.    LABS:  ROUTINE IP LABS (Last four results)  BMP  Recent Labs   Lab 05/25/22  0409 05/24/22  2328 05/24/22  1944 05/24/22  1642 05/24/22  0802 05/24/22  0736 05/23/22  0803 05/23/22  0622 05/22/22  1402 05/22/22  0730 05/21/22  0814 05/21/22  0717   NA  --   --   --   --   --  139  --  135  --  136  --  137   POTASSIUM  --   --   --   --   --  4.0  --  4.0  --  3.9  --  3.9   CHLORIDE  --   --   --   --   --  103  --  102  --  101  --  103   OLYA  --   --   --   --   --  9.2  --  9.6  --  9.2  --  9.5   CO2  --   --   --   --   --  29  --  25  --  27  --  25   BUN  --   --   --   --   --  13  --  14  --  15  --  16   CR  --   --   --   --   --  0.89  --  0.81  --  0.74  --  0.86   GLC 86 87 92 91   < > 86   < > 87   < > 88   < > 100*    < > = values in this interval not displayed.     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 Midline neck incision partially closed at bedside on 5/21. Seven teeth extracted on 5/23, with plan for further extraction in next few months.     Neuro:  - Pain control    HEENT:  - Saline-soaked kerlix dressing changes. Do not need to pack aggressively, stop with any resistance.    Respiratory:  - supplemental O2 PRN to keep sats >92%    CV/heme:  - hemodynamically stable  - on eliquis per heme for internal jugular thrombus  - Amicar swishes     FEN/GI:  - Full liquid Diet, G-tube  - Tolerating good PO, may potentially discharge on FLD and not TF pending calorie counts  - Encourage PO today  - Bowel regimen    :  - voiding independently    ID:  - ID recommending Augmentin on discharge until at least 5/27 with outpatient follow-up and repeat imaging at that time to determine need for continuation    PPX:  - SCDs  - IS    Dispo: Pending convalescence, ready for discharge, pending ride availability    -- Patient and above plan to be discussed with Dr Margarita Gonzalez MD  Otolaryngology Head and Neck Surgery Resident, PGY-2  Please page on call Otolaryngology resident with questions.

## 2022-05-25 NOTE — PROGRESS NOTES
CLINICAL NUTRITION SERVICES - REASSESSMENT NOTE     Nutrition Prescription      Malnutrition Status:    - Unable to determine at this time    Recommendations already ordered by Registered Dietitian (RD):  - Discontinue Prosource modular, now that pt is consuming oral intakes  - Discontinue Nutrisource modular d/t no evidence of ongoing diarrhea  - Obtain current wt    Future/Additional Recommendations:  - Due to ongoing improvement in po intakes with diet adv, would consider stopping TF  - If wt loss persists, recommend supplementing po diet with Ensure Enlive supplement (will need to be thicken per SLP recommendations)  - Continue to monitor wt trends     EVALUATION OF THE PROGRESS TOWARD GOALS   Nutrition Support:Continues on TF via G tube consisting of Osmolite 1.5 with goal of 7 cans per day ( 2 cans TID plus 1 can x 1 feeding), plus Prosource modular x 2 pkts BID    Intake:Ave TF intakes received x 7 days = 1206 ml which provides 1809 kcals and 76 g PRO    - Ave intakes of Prosource modular x 7 days = 2 pkts per day which provides 160 kcals and 44 g PRO.    Total ave nutritional intakes from TF plus Prosource modular x 7 days = 1969 kcals (22 kcals/kg  and 120 g PRO (1.4 g PRO/kg)    NEW FINDINGS   Diet: Pureed Diet (level 4) Liquidized/Moderately Thick (level 3) per SLP recommendations.    Intakes: Calorie counts ordered on 5/24 x 3 days. Yesterday's intakes showed pt consuming 1122 kcals and 60 g PRO  - Per RN report this evening, informed that pt consumed 100% x 3 meals today.    Total ave nutritional intakes from TF plus Prosource modular plus po diet x 7 days = 2129 kcals (27 kcals/kg) and 129 g PRO (1.6 g PRO/kg)    Weight: 79 kg (today), 77.2 kg (5/23), 98.4 kg (5/11), 88 kg (5/4): wt loss of 9 kg (10% loss) x approximately 3 weeks which is a significant amount of wt to lose in a relatively short period of time, despite pt receiving MIVF's until 5/16, in addition to H20 flushes of 200 ml every 4 hrs as  of 5/11.    ASSESSED NUTRITION NEEDS: new dosing wt = 79 kg (lowest wt this admission),   Estimated Energy Needs: 2370+ kcals/day (30+ kcals/kg)  Justification: Increased needs d/t wt loss and  wound healing  Estimated Protein Needs: 103--119 grams protein/day (1.2 - 1.5 grams of pro/kg)  Justification: Hypercatabolism with critical illness, wound healing    Labs:  PO4 > normal x 3 days    Meds:  Nutrisource Fiber 1 pkt QID - pt with minimal stooling over the past week.    MALNUTRITION (limited d/t unable to see at this time)  % Intake: Decreased intake does not meet criteria  % Weight Loss: > 2% in 1 week (severe)  Subcutaneous Fat Loss: Unable to assess  Muscle Loss: Unable to assess  Fluid Accumulation/Edema: Unable to assess  Malnutrition Diagnosis: Unable to determine due to unable to complete all parameters of nutritional assessment.    Previous Goals   Total avg nutritional intake to meet a minimum of 25 kcal/kg and 1.2 g PRO/kg daily (per dosing wt 88 kg).     Evaluation: Partially Met for protein intakes    Previous Nutrition Diagnosis  Inadequate energy intake related to remains dependent on TF, but goal infusion vol not met as evidenced by total nutritional intakes from TF plus Prosource pkts x 7 days meeting only 22 kcals/kg.     Evaluation: Change     CURRENT NUTRITION DIAGNOSIS  Unintended wt loss related to inconsistent TF intakes vs hypermetabolism with illness as evidenced by Total ave nutritional intakes from TF plus Prosource modular plus po diet x 7 days meeting only 27 kcals/kg and wt loss of  9 kg (10% loss) x approximately 3 weeks.    INTERVENTIONS  Implementation  Collaboration with other providers - bedside RN    Goals  1. Wt not to decline < 79 kg  2.Total avg nutritional intake to meet a minimum of 30 kcal/kg and 1.2 g PRO/kg daily (per dosing wt 79 kg).    Monitoring/Evaluation  Progress toward goals will be monitored and evaluated per protocol.      Tegan Guzman RD,LD  6A pager  [phone]

## 2022-05-25 NOTE — DISCHARGE INSTRUCTIONS
Your supplies for your wound care was sent to:   [facility]   [address] [place] [address]  (P) [phone]  (F) [phone]   Please call them if you do not here from them within 2 days of discharge.

## 2022-05-25 NOTE — PROGRESS NOTES
ID Follow Up    Reviewed CT images, concerning paraesophageal abscess is resolved, remaining fluid collection is open at the neck incision. CRP is nearly normal and he has had several weeks of antibiotics since last surgery. Would recommend discontinuing antimicrobials (Unasyn and Fluconazole) at this time and monitoring for clinical change. No specific ID follow up is needed but Dr. Morejon provided him her contact information if issues arise.    Discussed with Dr. Morejon,  Yg Douglas MD  Infectious Disease Fellow

## 2022-05-25 NOTE — PLAN OF CARE
Status: Necrotizing soft tissue infection - thoracotomy, G-tube, and I&D of pharyngeal abscess  Vitals: VSS on RA   Neuros: Intact.   IV: saline locked   Resp/trach: WNL  Diet: pureed with moderately thick - good PO intake today.   Bowel status: BS+   : VOids without difficulty   Skin: anterior neck incision dressing changed, CDI. R chest incision with aquaphor. Chest tube sites with primapore.   Pain: managed with tylenol and flexiril   Activity: Up ad william   Plan: Discharge by 11 tomorrow. Continue to monitor and follow POC.   Updates this shift: Encourage patient to change neck dressings and packing - he will be doing this at home.

## 2022-05-25 NOTE — PROGRESS NOTES
Calorie Count  Intake recorded for: 5/24  Total Kcals: 1122 Total Protein: 60g  Kcals from Hospital Food: 1122   Protein: 60g  Kcals from Outside Food (average): 0 Protein: 0g  # Meals Ordered from Kitchen: 3  # Meals Recorded: 3 (Meal 1: 100% 2 servings mashed potatoes w/gravy, pureed chicken w/gravy, pureed pears, pudding, butter)     (Meal 2: 100% 2 servings tomato soup, 2 servings greek yogurt, 1 strawberry kiwi water, 2 servings orange juice, 2 servings apple juice, 1 lemon water)     (meal 3: 100% tomato soup, apple juice)  # Supplements Recorded: 0

## 2022-05-25 NOTE — PROGRESS NOTES
Care Management Follow Up    Length of Stay (days): 21    Expected Discharge Date: 05/25/2022     Concerns to be Addressed: other (see comments) (TBD)   (Pt would benefit from a assessment done by Psychiatry and Chem Dep when deemed appropriate.)  Patient plan of care discussed at interdisciplinary rounds: Yes    Anticipated Discharge Disposition: Home     Anticipated Discharge Services:  Home infusion  Anticipated Discharge DME:  Enteral feeds and supplies    Referrals Placed by CM/SW:  Home infusion, DME for wound care supplies  Private pay costs discussed: Not applicable    Additional Information:  This writer met and spoke with Robert regarding discharge plans. Robert  Is expecting to discharge to his father's place in [place]. He wanted this writer to call and speak with his father.   This writer called and spoke with Robert Gallo Sr.- father. This writer informed Robert Reddy that Robert will be ready for discharge tomorrow.  He had questions regarding infection and follow up appointments. He expressed concerns about returning to see providers next week- it is a long drive to come back. Will need to discuss with providers regarding the need to establish cares closer to [place] or to have virtual appointments for now.   Robert Reddy Will need to call this writer back [id number] when he is off work.     Addendum: 1150  This writer spoke with provider regarding discharge planning. Robert will not be going with enteral feeds- this writer will cancel the referral to [facility]. Providing team will call and speak with father.     Addendum: 1431  This writer spoke with Robert Cooper. He is able to come and  Robert tomorrow morning. He did still have additional questions regarding appts- which providers will address. He inquired about establishing cares where they will be. He remains supportive of Robert for his recovery.       Anastacia Royal, RN  Float RN Care Coordinator  Unit RNCC pager: [phone]      For Weekend & Holiday on call RN Care Coordinator:  (Tasks: Home care, home infusion, medical equipment/oxygen, transportation, IMM & MOON forms, etc.)     Text Paging in [employer] Smart Web is the preferred method of contact for these teams     [place] [facility] [place] (0800-1630) Saturday & Sunday; (7035-3129) [facility] Recognized Holidays  Pager #1: [phone] Units: 4A, 4C, 4E, 5A & 5B   Pager #2: [phone] Units: 6A, 6B, 6C, 6D  Pager #3: [phone] Units: 7A, 7B, 7C, 7D & 5C   Pager #4: [phone] Units: 5 Ortho, 8A, 10 ICU, & [facility]      For Weekend & Holiday on call Social Work:  (Tasks: TCU, transportation, Hospice, adjustment to illness counseling, Health Care Directives, Child Protection and Domestic Violence concerns, Vulnerable Adult, IMM forms, etc.)     Text Paging in [employer] Smart Web is the preferred method of contact for these teams    [place] (0800 - 1630) Saturday and Sunday  Pager: [phone] Units: 4A, 4C, 4E, 5A and 5B   Pager: [phone] Units: 6A, 6B, 6C, 6D   Pager: [phone]: 7A, 7B, 7C, 7D, and 5C      [facility] (0800-1630) Saturday and Sunday  Units: 5 Ortho, 8A, and 10 ICU   Pager: [phone]   ______________________________________________     After hours for all units everyday- (only the  is available after hours until midnight)  Pager [phone]

## 2022-05-25 NOTE — PROGRESS NOTES
"Care Management Follow Up    Length of Stay (days): 21    Expected Discharge Date: 05/25/2022, pending medical stability & transportation.       Concerns to be Addressed:  Discharge planning  Patient plan of care discussed at interdisciplinary rounds: Yes    Anticipated Discharge Disposition:  Dad's home in [place], [place]     Anticipated Discharge Services:    Anticipated Discharge DME:  Tube feeding supplies. Dressing supplies    Patient/family educated on Medicare website which has current facility and service quality ratings:  NA  Education Provided on the Discharge Plan:  Yes  Patient/Family in Agreement with the Plan:  Yes    Referrals Placed by CM/SW:  [facility] for tube feeding.   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 Midline neck incision partially closed at bedside on 5/21. Seven teeth extracted on 5/23, with plan for further extraction in next few months.\"    ---DVT left internal jugular. Heme/Onc consulting for anticoagulation management.     ---Hx of Meth use.      Additional Information:  In 6A Discharge Rounds Dr Blackburn reported pt had teeth extracted yesterday. Follow-up on discharge anticoagulation plan. Pt on tube feeding per g-tube and also on full liquids. Calorie counts. Pending oral intake pt may not need tube feeding at discharge. Pt had PEG placed on 5-04-22 by Thoracic Surgery. Typically, the tube has to remain in place for 6 weeks before it can be removed. ENT will follow-up with Thoracic. "   Weights  5- (admission): 194 pounds  5-11:  216 pounds  5-23:  170 pounds    ---On Unasyn IV every 6 hours. Do not anticipate pt will discharge on IV antibiotics.     Spoke with pt's nurse. She reported pt is administering tube feeding by himself. Neck dressing change is not complex.   Referral sent to  Home Infusion for home tube feeding. Informed pt has 100% coverage.    Met with pt this afternoon for discharge planning. Pt alert & oriented. Observed he was ambulating in the garcia earlier today. Pt reports he is taking some oral diet; said he lost 40 pounds since he has been here. Pt said he plans to stay with his Dad in [place], [place]. His Dad plans to pick him up on Saturday, May 28th. Pt said it is better for him to be away from this area.   Pt does not have a primary doctor. Suggested it would be beneficial. Pt said he may go to [place], [place]; this is about an hour from his Dad in [place].   Dr Erazo arrived and changed neck dressing, saline soaked gauze, small amount packed into space, stockinette to hold in place. Informed Dr Erazo pt plans to stay with his Dad in [place], he won't be coming until 5-28. ENT will speak with pt more tomorrow about discharge. RNCC can see if pt's insurance would provide a ride.   ______________    ---RNCC will check if pt has transportation coverage thru his insurance. If possible, then he may get a ride closer to his Dad's home and then his Dad could pick him up. Would need to discuss further with pt.   ---Send script for dressing supplies with pt. (Miscellaneous DME Order, under Chart Review, Other Orders in Epic.)  ---Discharge nurse to send pt home with 1 week supply of dressings.   ---Obtain Dad's address for  Home Infusion (if discharging on tube feeding)  ---Order Normal Saline for wound care from  Discharge Pharmacy.  ________________    Dressing Supplies (possible options from the Medicare web site)    [place], [place]  Phone:  [phone]    [place], [place]  Phone:  [phone]  __________________      Ne Keys RN Care Coordinator  Unit 6A, [facility]  Phone: [phone]

## 2022-05-26 VITALS
DIASTOLIC BLOOD PRESSURE: 72 MMHG | TEMPERATURE: 98.1 F | HEIGHT: 75 IN | RESPIRATION RATE: 16 BRPM | OXYGEN SATURATION: 99 % | BODY MASS INDEX: 21.66 KG/M2 | HEART RATE: 61 BPM | SYSTOLIC BLOOD PRESSURE: 122 MMHG | WEIGHT: 174.2 LBS

## 2022-05-26 LAB
ANION GAP SERPL CALCULATED.3IONS-SCNC: 7 MMOL/L (ref 3–14)
BUN SERPL-MCNC: 15 MG/DL (ref 7–30)
CALCIUM SERPL-MCNC: 9.4 MG/DL (ref 8.5–10.1)
CHLORIDE BLD-SCNC: 104 MMOL/L (ref 94–109)
CO2 SERPL-SCNC: 24 MMOL/L (ref 20–32)
CREAT SERPL-MCNC: 0.84 MG/DL (ref 0.66–1.25)
CRP SERPL-MCNC: 6.5 MG/L (ref 0–8)
ERYTHROCYTE [DISTWIDTH] IN BLOOD BY AUTOMATED COUNT: 14.8 % (ref 10–15)
ERYTHROCYTE [SEDIMENTATION RATE] IN BLOOD BY WESTERGREN METHOD: 76 MM/HR (ref 0–15)
GFR SERPL CREATININE-BSD FRML MDRD: >90 ML/MIN/1.73M2
GLUCOSE BLD-MCNC: 84 MG/DL (ref 70–99)
GLUCOSE BLDC GLUCOMTR-MCNC: 140 MG/DL (ref 70–99)
GLUCOSE BLDC GLUCOMTR-MCNC: 92 MG/DL (ref 70–99)
HCT VFR BLD AUTO: 34.1 % (ref 40–53)
HGB BLD-MCNC: 10.9 G/DL (ref 13.3–17.7)
MAGNESIUM SERPL-MCNC: 2.2 MG/DL (ref 1.6–2.3)
MCH RBC QN AUTO: 29.6 PG (ref 26.5–33)
MCHC RBC AUTO-ENTMCNC: 32 G/DL (ref 31.5–36.5)
MCV RBC AUTO: 93 FL (ref 78–100)
PHOSPHATE SERPL-MCNC: 4.8 MG/DL (ref 2.5–4.5)
PLATELET # BLD AUTO: 302 10E3/UL (ref 150–450)
POTASSIUM BLD-SCNC: 3.9 MMOL/L (ref 3.4–5.3)
RBC # BLD AUTO: 3.68 10E6/UL (ref 4.4–5.9)
SODIUM SERPL-SCNC: 135 MMOL/L (ref 133–144)
WBC # BLD AUTO: 2.9 10E3/UL (ref 4–11)

## 2022-05-26 PROCEDURE — 85652 RBC SED RATE AUTOMATED: CPT | Performed by: STUDENT IN AN ORGANIZED HEALTH CARE EDUCATION/TRAINING PROGRAM

## 2022-05-26 PROCEDURE — 36415 COLL VENOUS BLD VENIPUNCTURE: CPT | Performed by: STUDENT IN AN ORGANIZED HEALTH CARE EDUCATION/TRAINING PROGRAM

## 2022-05-26 PROCEDURE — 84100 ASSAY OF PHOSPHORUS: CPT | Performed by: STUDENT IN AN ORGANIZED HEALTH CARE EDUCATION/TRAINING PROGRAM

## 2022-05-26 PROCEDURE — 250N000013 HC RX MED GY IP 250 OP 250 PS 637: Performed by: STUDENT IN AN ORGANIZED HEALTH CARE EDUCATION/TRAINING PROGRAM

## 2022-05-26 PROCEDURE — 83735 ASSAY OF MAGNESIUM: CPT | Performed by: STUDENT IN AN ORGANIZED HEALTH CARE EDUCATION/TRAINING PROGRAM

## 2022-05-26 PROCEDURE — 250N000011 HC RX IP 250 OP 636: Performed by: STUDENT IN AN ORGANIZED HEALTH CARE EDUCATION/TRAINING PROGRAM

## 2022-05-26 PROCEDURE — 86140 C-REACTIVE PROTEIN: CPT | Performed by: STUDENT IN AN ORGANIZED HEALTH CARE EDUCATION/TRAINING PROGRAM

## 2022-05-26 PROCEDURE — 250N000013 HC RX MED GY IP 250 OP 250 PS 637

## 2022-05-26 PROCEDURE — 85027 COMPLETE CBC AUTOMATED: CPT | Performed by: STUDENT IN AN ORGANIZED HEALTH CARE EDUCATION/TRAINING PROGRAM

## 2022-05-26 PROCEDURE — 80048 BASIC METABOLIC PNL TOTAL CA: CPT | Performed by: STUDENT IN AN ORGANIZED HEALTH CARE EDUCATION/TRAINING PROGRAM

## 2022-05-26 RX ORDER — HYDROXYZINE HCL 10 MG/5 ML
25 SOLUTION, ORAL ORAL EVERY 8 HOURS PRN
Qty: 100 ML | Refills: 0 | Status: SHIPPED | OUTPATIENT
Start: 2022-05-26

## 2022-05-26 RX ORDER — MINERAL OIL/HYDROPHIL PETROLAT
OINTMENT (GRAM) TOPICAL 3 TIMES DAILY
Qty: 50 G | Refills: 1 | Status: SHIPPED | OUTPATIENT
Start: 2022-05-26

## 2022-05-26 RX ORDER — ACETAMINOPHEN 325 MG/1
650 TABLET ORAL EVERY 4 HOURS PRN
Qty: 50 TABLET | Refills: 0 | Status: SHIPPED | OUTPATIENT
Start: 2022-05-26

## 2022-05-26 RX ADMIN — APIXABAN 10 MG: 5 TABLET, FILM COATED ORAL at 08:31

## 2022-05-26 RX ADMIN — WHITE PETROLATUM: 1.75 OINTMENT TOPICAL at 08:36

## 2022-05-26 RX ADMIN — ACETAMINOPHEN 325MG 650 MG: 325 TABLET ORAL at 00:26

## 2022-05-26 RX ADMIN — AMPICILLIN SODIUM AND SULBACTAM SODIUM 3 G: 2; 1 INJECTION, POWDER, FOR SOLUTION INTRAMUSCULAR; INTRAVENOUS at 05:35

## 2022-05-26 RX ADMIN — CYCLOBENZAPRINE HYDROCHLORIDE 5 MG: 5 TABLET, FILM COATED ORAL at 10:42

## 2022-05-26 ASSESSMENT — ACTIVITIES OF DAILY LIVING (ADL)
ADLS_ACUITY_SCORE: 20

## 2022-05-26 NOTE — PLAN OF CARE
Speech Language Therapy Discharge Summary    Reason for therapy discharge:    Discharged to home with outpatient therapy.    Progress towards therapy goal(s). See goals on Care Plan in [facility] electronic health record for goal details.  Goals not met.  Barriers to achieving goals:   discharge from facility.    Therapy recommendation(s):    Continued therapy is recommended.  Rationale/Recommendations:  Continued ST for dysphagia at discharge.    Recommend continue puree diet (4)with moderately-thick liquids (3) given swallow strategies (no straws, fully upright for all PO, additional hard swallow after each bite and sip, and alternate bites/sips). Recommend repeat VFSS in 2-3 weeks (earliest date of completion 6/1 per VFSS notes).              VFSS note from 5/18:  Video swallow study completed per PA orders. Pt presents with moderate-severe oropharyngeal dysphagia under fluoroscopy. Pt's swallow function characterized by reduced BOT retraction, UES retention, limited anterior hyoid excursion resulting reduced hyolaryngeal elevation, reduced laryngeal vestibule closure, and incomplete epiglottic inversion. Pt assessed with thin, mildly-thick, moderately-thick, puree and semisolid texture.  Premature pharyngeal entry with thin liquids.  Swallow initiation at pyriform sinuses for thin and mildly-thick liquids, and at the posterior surface of the epiglottis with moderately-thick liquids, puree, and semisolid texture.  Jarocho aspiration with thin liquids with and without use of supraglottic swallow strategy, which occurred during and after the swallow.  Jarocho aspiration during the swallow with mildly-thick liquids without use of supraglottic swallow.  Use of suproglottic swallow while consuming mildly-thick liquids improved airway invasion and pt was able to clear laryngeal vestibule of residue.  Shallow penetration with moderately-thick liquids via cup, puree, semisolid texture.  Minimal moderately-thick residue present in  "laryngeal vestibule after the swallow.  Chin tuck strategy and head turn strategy did not improve airway protection. After the swallow, pt with mild-moderate pharyngeal residue in valleculae, posterior pharyngeal wall, and pyriforms which increased with semisolid texture.  Pt able was able partially clear residue with liquid rinse, pt noted to have good awareness as to when this residue was present.  Pt noting today his preference to consume thicker liquids without \"needing to do anything extra\", i.e. use of supraglottic swallow.  Recommend full liquid diet (moderately-thick consistency, level 3), no straws. Pt should be fully upright for all PO and complete excellent oral cares. Recommend additional hard swallow after each bite and sip, and for pt to alternate bites/sips.  Recommend repeat VFSS in 2-3 weeks pending progress. ST to continue to follow targeting PO tolerance, completion of oropharyngeal exercises, and advancement as appropriate. Anticipate pt will require OP SLP follow-up at discharge targeting dysphagia.    "

## 2022-05-26 NOTE — DISCHARGE SUMMARY
Discharge Summary  Robert Gallo  [id number]  1995    Date of Admission: 5/4/2022  Date of Discharge: 5/26/2022    Admission Diagnosis: Necrotizing soft tissue infection [M79.89]  Discharge Diagnosis: Same    Procedures:  Date: 05/04/2022  Procedure(s):  By ENT:  Oral Exploration, Neck Exploration    By Thoracic Surgery:  Bilateral thoracoscopy.  Right anterior mediastinotomy with drainage of retrosternal abscess.  Percutaneous endoscopic gastrostomy tube placement.     5/13/2022  Repeat neck exploration, oral cavity exploration      HPI: Robert Gallo is a 26 year old male with no significant past medical history who was transferred to [facility] after presenting to an outside hospital with days of neck pain. He was intubated at the outside hospital and ultimately transferred for further management. Imaging from outside hospital revealed gas tracking in the bilateral floor of mouth extending inferiorly into the neck. Thoracic Surgery became involved in the case due to mediastinal involvement of the infection. His initial labs revealed WBC count of 12.4, , ESR 69, AST 32, and ALT 26. Cultures and gram stains of abscesses showed  Prevotella species, Strep anginosus, Staph epidermidis, Gram negative bacilli, and Gram positive bacilli. It was recommended that he undergo operative intervention and the patient's mother consented to the above procedure after detailed explanation of the risks and benefits of said procedures dated 05/04/2022.    Hospital Course: The patient was admitted to the hospital and underwent the above mentioned procedure. He tolerated the procedure without any intra- or deanne-operative complications. Please see the operative report for full details of the procedure. The patient was admitted for post-operative monitoring. He remained intubated after his initial procedure until 5/10. During his post operative course he was diagnosed with septic thrombophlebitis of the internal jugular  and was prescribed 3 months of anticoagulation as recommended by hematology. Oral surgery also performed dental extractions of 7 teeth. ID was consulted for for antibiotic and antifungal recs due to additional cultures on 5/6 growing candida dubliniensis, and 4+ yeast. He was treated with IV Zosyn with narrowing to IV Unasyn and fluconazole during his hospitalization. Clindamycin was discontinued during his admission. His CRP and ESR continued to be labile and a repeat CT on 5/12 demonstrated persisting abscesses in the neck and submandibular spaces as well as a paraesophageal fluid collection. He was brought back to the OR for repeat debridement on 5/13. Penrose was sutured into center of neck incision on 5/19, and he was discharged with it. Patient shared that he has been a consistent methamphetamine user, admit tox screen was positive for cannabinoids only. At discharge, the patient's pain was well controlled, the patient was voiding on his own, and was ambulating. His labs at discharge were 2.9 (12.4), CRP 6.5 (330), ESR 76 (69), AST 52 (32), ALT 75 (26). ID discontinued all antibiotics at discharge and required no follow up at this time. He was cleared by speech for pureed diet and discharged on PO diet only. Follow ups have been coordinated with ENT for suture removals and post op evaluation and thoracic for G tube removal. An appointment with a primary care provider will be arranged for the patient to address the low WBC count and abnormal liver enzymes.     Discharge Exam:  Vitals:    05/25/22 1700 05/25/22 2255 05/25/22 2300 05/26/22 0726   BP:   121/69 122/72   BP Location:   Left arm Left arm   Pulse:   89 61   Resp:   16 16   Temp: 99.6  F (37.6  C)  97.7  F (36.5  C) 98.1  F (36.7  C)   TempSrc: Oral  Oral Oral   SpO2:   98% 99%   Weight:  79 kg (174 lb 3.2 oz)     Height:           General: Alert and oriented x 3, No acute distress, laying comfortably in bed              HEENT: Midline neck wound with  blood soaked gauze packing, removed and replaced with saline soaked kerlix. Sutures removed from lateral aspects of neck incision. Neck incision intact. Chest incision sutures removed, incision superficially dehiscent and reinforced with steri strips and glue. Chest incision covered with gauze and tape. No surrounding skin changes or erythema. Dental extraction sites healing with absorbable suture. PEG tube in place, stoma clean and dry.              Pulmonary: Breathing non-labored, no stridor, no accessory muscle use.  Discharge Medications:     Medication List      Started    acetaminophen 325 MG tablet  Commonly known as: TYLENOL  650 mg, Oral, EVERY 4 HOURS PRN     apixaban ANTICOAGULANT 5 MG tablet  Commonly known as: ELIQUIS  Take 2 tablets (10 mg) by mouth 2 times daily for 5 days, THEN 1 tablet (5 mg) 2 times daily.  Start taking on: May 26, 2022     hydrOXYzine 10 MG/5ML syrup  Commonly known as: ATARAX  25 mg, Oral, EVERY 8 HOURS PRN     mineral oil-hydrophilic petrolatum external ointment  Topical, 3 TIMES DAILY, Apply to right anterior chest incision            Discharge Procedure Orders   Infectious Disease Referral   Standing Status: Future   Referral Priority: Priority: 1-2 Weeks Referral Type: Consultation   Requested Specialty: Infectious Diseases   Number of Visits Requested: 1     Primary Care Referral   Standing Status: Future   Referral Priority: Priority: 1-2 Weeks Referral Type: Consultation     Discharge Instructions   Order Comments: DISCHARGING RN- PLEASE REVIEW BELOW WITH PATIENT PRIOR TO DISCHARGE    PEG TUBE INSTRUCTIONS:     Skin care:  -Change the gauze dressing around your PEG tube daily.  -Check for redness and swelling in the area where the tube goes into your skin.    -Keep the skin around your tube dry and with a split gauze under the flange. If the hole where the tube enters your body is draining fluid, you may need to put barrier cream or antibiotic cream on the skin around  "your tube after you are done cleaning it. Cover the area with bandages, and call the Thoracic Surgery clinic (number below).  -If there are no stitches holding your PEG tube in place on your skin, gently turn the tube. This may decrease pressure on your skin, and help prevent an infection.     Flushes:  -Flush your feeding tube with 30cc of water twice daily to ensure it does not become plugged  -If administering medications or tube feedings via your tube, make sure to flush with water immediately after use to prevent the tube from plugging    DISCHARGING RN- PLEASE PROVIDE PEG SUPPLIES AT THE TIME OF DISCHARGE            -2 Syringes 60ml (Cath tip or En-Fit as appropriate)            -5 2x2 split gauze packets       We will coordinate with your primary team to schedule a follow up appointment in clinic with you at the same time as your other follow up appointments in order to remove the PEG tube.    For Questions or Concerns;   A.) During weekday working hours (Monday through Friday 8am to 4:30pm)   call [phone] and ask to speak to a clinical nurse specialist.     B.) At nights (after 4:30pm), on weekends, or if urgent call [phone] and   tell the  \"I would like to page job code [phone], the thoracic surgery   resident on call, please.\"     Reason for your hospital stay   Order Comments: Post-operative care and treatment of necrotizing fasciitis     Activity   Order Comments: Your activity upon discharge:  No heavy lifting greater than 10 lbs and no strenuous exercise for 2 weeks or until follow up appointment.     Order Specific Question Answer Comments   Is discharge order? Yes      When to contact your care team   Order Comments: Please notify your doctor if you experience wound breakdown, sustained bleeding from the wound site, or increasing redness, swelling, and/or purulent malorodorous discharge from the wound site which may indicate infection. If you feel it is acute, or experience " "sudden changes in breathing, chest pain, or excessive sleepiness/somnolence please return to the emergency department or call 911. If you have questions or concerns during the day please call ENT clinic and [phone]. If at night you can call [facility] at [phone] and ask for the \"ENT resident on call\".     Wound care and dressings   Order Comments: Instructions to care for your wound at home: Pack neck wound twice daily. Remove old packing. Moisten 1/2 inch strip gauze with saline. Use a cotton tipped applicator to gently pack the neck wound with packing strip gauze. Cover with 4x4 gauze and secure with Surgilast flexible netting dressing around the neck.     Adult [facility] Follow-up and recommended labs and tests   Order Comments: Follow up in ENT clinic with Dr. Gore on Wednesday, June 8th. Please call the clinic with questions/concerns: [phone].  Otolaryngology/ENT Clinic:  [facility]  [address] [place] [id number]    Follow up for g-tube removal in thoracic surgery clinic the same day you return for ENT follow up (June 8th). Please call the thoracic surgery clinic with questions/concerns: [phone].    Follow up with your primary care provider within 2 weeks of discharge from the hospital to establish care and monitoring of your anticoagulation. You will need to follow up again in 3 months to discuss discontinuing anticoagulation therapy. You should also have your white blood count checked at this 3 month visit as it was low when you were hospitalized.     Follow up with oral surgery as directed to schedule remaining dental extractions.   Oral and Maxillofacial Surgery Clinic - [facility]  7th floor of [facility] [place]  [address] [place] [id number]  Clinic phone number: [phone]  Clinic fax number: [phone]    Appointments on [place] and/or [facility] (with " [facility] or [facility] provider or service). Call [phone] if you haven't heard regarding these appointments within 7 days of discharge.     Miscellaneous DME Order   Order Comments: Supplies needed:  1/2 inch strip gauze   Cotton tipped applicators  4x4 gauze   Surgilast tubular elastic dressing, size 7    Diagnosis: necrotizing fasciitis, open neck wound    I, the undersigned, certify that the above prescribed supplies are medically necessary for this patient and is both reasonable and necessary in reference to accepted standards of medical and necessary in reference to accepted standards of medical practice in the treatment of this patient's condition and is not prescribed as a convenience.     Order Specific Question Answer Comments   DME Provider: [facility]    DME Item Needed: wound care supplies    Length of Need: 14 days      Diet   Order Comments: Follow this diet upon discharge: Pureed diet, moderately thickened liquids     Order Specific Question Answer Comments   Is discharge order? Yes        Dispo: To home in good condition. All of the patient's questions/concerns have been addressed at this time.     Radha Dasilva PA-C   Otolaryngology-Head & Neck Surgery  Please contact ENT by dialing * [phone] and entering job code [phone].

## 2022-05-26 NOTE — PROGRESS NOTES
"Otolaryngology Progress Note  May 26, 2022    SUBJECTIVE: No acute events, afebrile, eating 100% of PO full liquid diet, tolerating well. Per ID recommendations will stop all anti-microbials, based on CT neck/chest and clinical status.     OBJECTIVE:   /72 (BP Location: Left arm)   Pulse 61   Temp 98.1  F (36.7  C) (Oral)   Resp 16   Ht 1.9 m (6' 2.8\")   Wt 79 kg (174 lb 3.2 oz)   SpO2 99%   BMI 21.89 kg/m     General: Alert and oriented x 3, No acute distress, laying comfortably in bed   HEENT: Midline neck wound with blood soaked gauze packing, removed and replaced with saline soaked kerlix. Chest incision sutures intact, clean. No surrounding skin changes or erythema. Dental extraction sites healing with absorbable suture.   Pulmonary: Breathing non-labored, no stridor, no accessory muscle use.    LABS:  ROUTINE IP LABS (Last four results)  BMP  Recent Labs   Lab 05/26/22  0401 05/25/22  2318 05/25/22  1421 05/25/22  1203 05/25/22  0653 05/24/22  0802 05/24/22  0736 05/23/22  0803 05/23/22  0622 05/22/22  1402 05/22/22  0730   NA  --   --   --   --  136  --  139  --  135  --  136   POTASSIUM  --   --   --   --  3.8  --  4.0  --  4.0  --  3.9   CHLORIDE  --   --   --   --  103  --  103  --  102  --  101   OLYA  --   --   --   --  9.3  --  9.2  --  9.6  --  9.2   CO2  --   --   --   --  26  --  29  --  25  --  27   BUN  --   --   --   --  14  --  13  --  14  --  15   CR  --   --   --   --  0.87  --  0.89  --  0.81  --  0.74   GLC 92 93 105* 169* 84   < > 86   < > 87   < > 88    < > = values in this interval not displayed.     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 Midline neck incision partially closed at bedside on 5/21. Seven teeth extracted on 5/23, with plan for further extraction in next few months.     Neuro:  - Pain control    HEENT:  - Saline-soaked kerlix dressing changes. Do not need to pack aggressively, stop with any resistance.    Respiratory:  - supplemental O2 PRN to keep sats >92%    CV/heme:  - hemodynamically stable  - on eliquis per heme for internal jugular thrombus, will take for three months and follow up with PCP  - Amicar swishes PRN    FEN/GI:  - Full liquid Diet, G-tube  - Tolerating good PO, may potentially discharge on FLD and not TF pending calorie counts  - Encourage PO today  - Bowel regimen    :  - voiding independently    ID:  -Discontinue anti-microbials     PPX:  - SCDs  - IS    Dispo: Pending convalescence, ready for discharge, pending ride availability    -- Patient and above plan to be discussed with Dr Margarita Blackburn MD  Otolaryngology Head and Neck Surgery Resident, PGY-1  Please page on call Otolaryngology resident with questions.

## 2022-05-26 NOTE — TELEPHONE ENCOUNTER
FUTURE VISIT INFORMATION      FUTURE VISIT INFORMATION:    Date: 6/8/22    Time: 10:20AM    [place] [facility] [place]:    Referring provider:      Referring providers clinic:      Reason for visit/diagnosis  2 week Hospital follow up for Necrotizing Fasciitis, per LP    RECORDS REQUESTED FROM:       Clinic name Comments Records Status Imaging Status   [facility]  5/4/22 - 5/26/22 hospital encounter     5/4/22 ENT consult with Dorina Gomez MD EPIC    Imaging 5/25/22 CT NEck and CT Chest   5/18/22 XR Video Swallow   5/6/22 CT Head Epic PACS    [facility] [place]     5/3/22 ED note from Hong Del Angel MD   Care everywhere

## 2022-05-26 NOTE — PLAN OF CARE
Status: Admitted 5/4 for necrotizing soft tissue infection. S/p thoracotomy, Gtube and I&D of pharyngeal abscess 5/4, exploration of neck 5/13  Vitals: VSS on RA. Max T 99.7  Neuros: Intact  IV: PIV SL between antibiotics  Labs/Electrolytes: WNL  Resp/trach: WNL  Diet: Pureed with moderately thickened liquids - ate 100% of all 3 meals   Bowel status: LBM 5/25  : Voiding  Skin: R chest wound sutured, aquaphor applied, new dressing. Pt changed neck dressing/packing per order with RN supervision this evening  Pain: PRN tylenol/atarax/flexeril effective for neck/oral pain  Activity: Independent  Plan: Plans to discharge tomorrow, see CC note for further info

## 2022-05-26 NOTE — PROGRESS NOTES
Calorie Count  Intake recorded for: 5/25  Total Kcals: 1760 Total Protein: 112g  Kcals from Hospital Food: 1760   Protein: 112g  Kcals from Outside Food (average):0 Protein: 0g  # Meals Ordered from Kitchen: 3  # Meals Recorded: 3 (Meal 1: 100% 2 servings mashed potatoes w/ gravy, puree chicken, pears, pudding)     (Meal 2: 100%  2 servings puree fish w/gravy, mashed potatoes w/gravy, broccoli, berries, 2 servings apple juice)     (Meal 3: 100% pureed eggs, puree Singaporean toast, cream of wheat w/brown sugar, puree berries, greek yogurt, applesauce, orange juice, scott water)  # Supplements Recorded: 0

## 2022-05-26 NOTE — DISCHARGE SUMMARY
Discharge time/date: 1100 5/26  Walked or Wheelchair: Walked  PIV removed: Yes  Reviewed AVS with patient: Yes  Medication due times added to AVS in EPIC: N/A  Verbalized understanding of discharge with teachback: Yes  Medications retrieved from pharmacy: Yes  Supplies sent home: Yes  Belongings from security with patient: Discharged per patient    Discharge instructions reviewed with patient and with father. Enough supplies for dressing changes through Tuesday 5/31/22 sent home with patient.

## 2022-05-27 ENCOUNTER — PATIENT OUTREACH (OUTPATIENT)
Dept: CARE COORDINATION | Facility: CLINIC | Age: 27
End: 2022-05-27
Payer: COMMERCIAL

## 2022-05-27 DIAGNOSIS — Z71.89 OTHER SPECIFIED COUNSELING: ICD-10-CM

## 2022-05-27 NOTE — PROGRESS NOTES
"Clinic Care Coordination Contact  [facility]: Post-Discharge Note  SITUATION                                                      Admission:    Admission Date: 05/04/22   Reason for Admission: Necrotizing soft tissue infection  Discharge:   Discharge Date: 05/26/22  Discharge Diagnosis: Necrotizing soft tissue infection    BACKGROUND                                                      Per hospital discharge summary and inpatient provider notes:    Robert Gallo is a 26 year old male with no significant past medical history who was transferred to [facility] after presenting to an outside hospital with days of neck pain. He was intubated at the outside hospital and ultimately transferred for further management. Imaging from outside hospital revealed gas tracking in the bilateral floor of mouth extending inferiorly into the neck. Thoracic Surgery became involved in the case due to mediastinal involvement of the infection. His initial labs revealed WBC count of 12.4, , ESR 69, AST 32, and ALT 26. Cultures and gram stains of abscesses showed  Prevotella species, Strep anginosus, Staph epidermidis, Gram negative bacilli, and Gram positive bacilli. It was recommended that he undergo operative intervention and the patient's mother consented to the above procedure after detailed explanation of the risks and benefits of said procedures dated 05/04/2022.       ASSESSMENT      Enrollment  Primary Care Care Coordination Status: Not a Candidate    Discharge Assessment  How are you doing now that you are home?: Patient does not have a phone number listed.  From CC note pt went to father's house.  Writer called father who said everything is \"ship shape\"  There is no C2C on file but pt's father provided that they have everything they need and more supplies were to be delivered on Tuesday and pt is doing well.  How are your symptoms? (Red Flag symptoms escalate to triage hotline per guidelines): Improved  Do you " feel your condition is stable enough to be safe at home until your provider visit?: Yes  Does the patient have their discharge instructions? : Yes  Does the patient have questions regarding their discharge instructions? : No  Were you started on any new medications or were there changes to any of your previous medications? : Yes  Does the patient have all of their medications?: Yes  Do you have questions regarding any of your medications? : No  Do you have all of your needed medical supplies or equipment (DME)?  (i.e. oxygen tank, CPAP, cane, etc.): Yes  Discharge follow-up appointment scheduled within 14 calendar days? : Yes  Discharge Follow Up Appointment Date: 06/08/22  Discharge Follow Up Appointment Scheduled with?: Specialty Care Provider    Post-op ([facility] CTA Only)  If the patient had a surgery or procedure, do they have any questions for a nurse?: No             PLAN                                                      Outpatient Plan:       Follow up in ENT clinic with Dr. Gore on Wednesday, June 8th. Please call the clinic with questions/concerns: [phone].  Otolaryngology/ENT Clinic:  [facility]  [address] [place] [address]     Follow up for g-tube removal in thoracic surgery clinic the same day you return for ENT follow up (June 8th). Please call the thoracic surgery clinic with questions/concerns: [phone].     Follow up with your primary care provider within 2 weeks of discharge from the hospital to establish care and monitoring of your anticoagulation. You will need to follow up again in 3 months to discuss discontinuing anticoagulation therapy. You should also have your white blood count checked at this 3 month visit as it was low when you were hospitalized.      Follow up with oral surgery as directed to schedule remaining dental extractions.   Oral and Maxillofacial Surgery Clinic - [facility]  7th floor of [address] [address]  Clinic phone number: [phone]  Clinic fax number: [phone]            Future Appointments   Date Time Provider Department Center   6/8/2022  9:20 AM Mary Garcia APRN CNS [facility]   6/8/2022 10:20 AM Milena Gore MD [facility]   6/28/2022 11:00 AM Carla Arevalo MD [facility]         For any urgent concerns, please contact our 24 hour nurse triage line: [phone] ([phone])       MOUNIKA Alvarado  [phone]  [facility]

## 2022-06-01 LAB
BACTERIA ABSC ANAEROBE+AEROBE CULT: NO GROWTH
BACTERIA TISS BX CULT: ABNORMAL

## 2022-06-08 ENCOUNTER — PRE VISIT (OUTPATIENT)
Dept: OTOLARYNGOLOGY | Facility: CLINIC | Age: 27
End: 2022-06-08
Payer: COMMERCIAL

## 2022-06-08 ENCOUNTER — OFFICE VISIT (OUTPATIENT)
Dept: SURGERY | Facility: CLINIC | Age: 27
End: 2022-06-08
Attending: CLINICAL NURSE SPECIALIST
Payer: COMMERCIAL

## 2022-06-08 ENCOUNTER — OFFICE VISIT (OUTPATIENT)
Dept: OTOLARYNGOLOGY | Facility: CLINIC | Age: 27
End: 2022-06-08
Payer: COMMERCIAL

## 2022-06-08 VITALS
OXYGEN SATURATION: 100 % | WEIGHT: 180.5 LBS | TEMPERATURE: 97.7 F | HEART RATE: 68 BPM | SYSTOLIC BLOOD PRESSURE: 129 MMHG | DIASTOLIC BLOOD PRESSURE: 76 MMHG | BODY MASS INDEX: 22.68 KG/M2

## 2022-06-08 VITALS
DIASTOLIC BLOOD PRESSURE: 74 MMHG | BODY MASS INDEX: 21.64 KG/M2 | HEIGHT: 75 IN | OXYGEN SATURATION: 100 % | HEART RATE: 59 BPM | WEIGHT: 174 LBS | SYSTOLIC BLOOD PRESSURE: 126 MMHG

## 2022-06-08 DIAGNOSIS — I74.9 EMBOLISM AND THROMBOSIS (H): Primary | ICD-10-CM

## 2022-06-08 DIAGNOSIS — L02.11 ABSCESS OF NECK: Primary | ICD-10-CM

## 2022-06-08 PROCEDURE — 99213 OFFICE O/P EST LOW 20 MIN: CPT | Mod: 24 | Performed by: OTOLARYNGOLOGY

## 2022-06-08 PROCEDURE — 99024 POSTOP FOLLOW-UP VISIT: CPT | Performed by: CLINICAL NURSE SPECIALIST

## 2022-06-08 PROCEDURE — G0463 HOSPITAL OUTPT CLINIC VISIT: HCPCS

## 2022-06-08 ASSESSMENT — PAIN SCALES - GENERAL
PAINLEVEL: NO PAIN (0)
PAINLEVEL: NO PAIN (0)

## 2022-06-08 NOTE — LETTER
6/8/2022         RE: Robert Gallo  [address]  [place] [address]        Dear Colleague,    Thank you for referring your patient, Robert Gallo, to the [facility]. Please see a copy of my visit note below.    REASON FOR VISIT:  PEG removal    PROCEDURES PERFORMED:       PROCEDURE PERFORMED:     1.  Bilateral thoracoscopy.  2.  Right anterior mediastinotomy with drainage of retrosternal abscess.  3.  Percutaneous endoscopic gastrostomy tube placement.     DATE ABOVE PROCEDURES PERFORMED: 5/4/22    SURGEON:  Dr. Abundio Patel    History of Present Illness:  Robert is here today with his dad.   He was urgently transferred here after experiencing significant neck pain and difficulty breathing/swallowing.   He has not used his PEG tube for a long time, is managing his weight and got approval for the PEG tube to be removed.    Assessment:   After explaining the removal and after care, his PEG was removed with no complications.   Upon inspection, this tube was intact.    A gauze dressing and occlusive dressing was applied.  All questions were answered.    Plan:  Call or return PRN    Total time:  30 minutes  KIYA Caputo, CNS      Again, thank you for allowing me to participate in the care of your patient.        Sincerely,        KIYA Graf CNS

## 2022-06-08 NOTE — NURSING NOTE
"Oncology Rooming Note    June 8, 2022 9:14 AM   Robert Gallo is a 26 year old male who presents for:    Chief Complaint   Patient presents with     Oncology Clinic Visit     Rtn Surgery; PEG Removal     Initial Vitals: /76 (BP Location: Left arm, Cuff Size: Adult Regular)   Pulse 68   Temp 97.7  F (36.5  C) (Oral)   Wt 81.9 kg (180 lb 8 oz)   SpO2 100%   BMI 22.68 kg/m   Estimated body mass index is 22.68 kg/m  as calculated from the following:    Height as of 5/4/22: 1.9 m (6' 2.8\").    Weight as of this encounter: 81.9 kg (180 lb 8 oz). Body surface area is 2.08 meters squared.  No Pain (0) Comment: Sore, PEG   No LMP for male patient.  Allergies reviewed: Yes  Medications reviewed: Yes    Medications: ATARAX and Eliquis   Pharmacy name entered into EPIC: Data Unavailable    Clinical concerns: Pt is stating that his PEG is sore and Sutures are itching. Pt also mentioned that the bandage around his neck slides up and down. Pt is concerned about going back to work and what he can lift.     Leidy Quintana, EMT on June 8, 2022 at 9:18 AM            "

## 2022-06-08 NOTE — PROGRESS NOTES
Facial Plastic and Reconstructive Surgery      Robert Gallo comes in for follow up. He is doing well, he has been packing himself and managing his incisions. He had his G tube removed today. He is tolerating a full oral diet.  He feels minimal pain and does not have concerns today.    I asked about addiction support and he once again deferred. He will be following up with dental and internal medicine near his father's home where he will be living and transitioning his care. He stops the anticoagulation in August but needs an additional prescription.    His wound looks great. I removed sutures today. I also packed with some nugauze. I expect him not to need to pack beyond next week. He is ok just placing a bandaid over it.   He can discontinue the atarax.    I will see him back in three months.

## 2022-06-08 NOTE — LETTER
Date:Lelia 15, 2022      Provider requested that no letter be sent. Do not send.       [facility]

## 2022-06-08 NOTE — LETTER
Date:June 9, 2022      Patient was self referred, no letter generated. Do not send.        [facility] Health Information

## 2022-06-08 NOTE — LETTER
6/8/2022       RE: Robert Gallo  [address]  [place] [address]     Dear Colleague,    Thank you for referring your patient, Robert Gallo, to the [facility] [place] at [facility]. Please see a copy of my visit note below.    Facial Plastic and Reconstructive Surgery      Robert Gallo comes in for follow up. He is doing well, he has been packing himself and managing his incisions. He had his G tube removed today. He is tolerating a full oral diet.  He feels minimal pain and does not have concerns today.    I asked about addiction support and he once again deferred. He will be following up with dental and internal medicine near his father's home where he will be living and transitioning his care. He stops the anticoagulation in August but needs an additional prescription.    His wound looks great. I removed sutures today. I also packed with some nugauze. I expect him not to need to pack beyond next week. He is ok just placing a bandaid over it.   He can discontinue the atarax.    I will see him back in three months.       Again, thank you for allowing me to participate in the care of your patient.      Sincerely,    Mielna Gore MD

## 2022-06-08 NOTE — PROGRESS NOTES
REASON FOR VISIT:  PEG removal    PROCEDURES PERFORMED:       PROCEDURE PERFORMED:     1.  Bilateral thoracoscopy.  2.  Right anterior mediastinotomy with drainage of retrosternal abscess.  3.  Percutaneous endoscopic gastrostomy tube placement.     DATE ABOVE PROCEDURES PERFORMED: 5/4/22    SURGEON:  Dr. Abundio Patel    History of Present Illness:  Robert is here today with his dad.   He was urgently transferred here after experiencing significant neck pain and difficulty breathing/swallowing.   He has not used his PEG tube for a long time, is managing his weight and got approval for the PEG tube to be removed.    Assessment:   After explaining the removal and after care, his PEG was removed with no complications.   Upon inspection, this tube was intact.    A gauze dressing and occlusive dressing was applied.  All questions were answered.    Plan:  Call or return PRN    Total time:  30 minutes  KIYA Caputo, CNS

## 2022-06-13 ENCOUNTER — TELEPHONE (OUTPATIENT)
Dept: OTOLARYNGOLOGY | Facility: CLINIC | Age: 27
End: 2022-06-13
Payer: COMMERCIAL

## 2022-06-13 NOTE — TELEPHONE ENCOUNTER
Called pt to discuss the message we received from pharmacy re: pt inadvertently taking too much Eloquis.    Pt confirmed that he had been taking 2 5mg tabs twice a day.    I spoke with Dr. Gore and she wants the pt to stop taking the Eloquis for one week and establish care with an internal medicine doctor as soon as possible, who would help manage the Eloquis.    Pt said he had not yet found an internal medicine doctor, but said he would call in the next day or two.    Pt given my direct dial in the event he needs a referral.    Crystal Pizano, RN  6/13/2022 3:03 PM

## 2022-06-13 NOTE — TELEPHONE ENCOUNTER
Health Call Center    Phone Message    May a detailed message be left on voicemail: yes     Reason for Call: Other: Fanny from [address], called to update Dr Margarita Jerome on the Pt stating that he was not taking   apixaban ANTICOAGULANT (ELIQUIS) 5 MG tablet as prescribed, instead he was taking 2 tablets 2 times a day.  Fanny wanted to notify Dr Margarita Jerome about this.  Thank you.    Action Taken: Message routed to:  [facility] ([facility]): ENT    Travel Screening: Not Applicable

## 2022-06-16 ENCOUNTER — TELEPHONE (OUTPATIENT)
Dept: OTOLARYNGOLOGY | Facility: CLINIC | Age: 27
End: 2022-06-16
Payer: COMMERCIAL

## 2022-06-16 NOTE — TELEPHONE ENCOUNTER
Pt called to inform us that he made an appt with Manuela Hung NP, with Internal Medicine at the [facility] the [facility].    Pt aware that he should receive guidance on when to restart Eloquis at his appt today.    Records faxed to Manuela Hung.   Fax: [phone]    Crystal Pizano RN  6/16/2022 12:08 PM

## 2022-10-05 ENCOUNTER — VIRTUAL VISIT (OUTPATIENT)
Dept: OTOLARYNGOLOGY | Facility: CLINIC | Age: 27
End: 2022-10-05
Payer: COMMERCIAL

## 2022-10-05 VITALS — BODY MASS INDEX: 22.34 KG/M2 | HEIGHT: 74 IN

## 2022-10-05 DIAGNOSIS — M72.6 NECROTIZING FASCIITIS (H): Primary | ICD-10-CM

## 2022-10-05 PROCEDURE — 99441 PR PHYSICIAN TELEPHONE EVALUATION 5-10 MIN: CPT | Mod: 95 | Performed by: OTOLARYNGOLOGY

## 2022-10-05 NOTE — PROGRESS NOTES
Facial Plastic and Reconstructive Surgery      Robert Gallo and I had a follow up phone conversation. He is doing very well, is working, active in [employer], going to [employer] and about to join the [employer] choir. He stopped anticoagulation in August and has had no symptoms or concerns. His neck will feel occassionally tight.      He continues to follow with oral surgery and had molars removed recently. They will be working on a partial denture for him.    I am happy to hear all of this and I will see him as he needs in the future.

## 2022-10-05 NOTE — LETTER
10/5/2022       RE: Robert Gallo  [address]  [place] [facility] [address]     Dear Colleague,    Thank you for referring your patient, Robert Gallo, to the [facility] E[facility] NOSE AND TH[facility]INIC [place] at [facility]. Please see a copy of my visit note below.    Facial Plastic and Reconstructive Surgery      Robert Gallo and I had a follow up phone conversation. He is doing very well, is working, active in [employer], going to [employer] and about to join the [employer] choir. He stopped anticoagulation in August and has had no symptoms or concerns. His neck will feel occassionally tight.      He continues to follow with oral surgery and had molars removed recently. They will be working on a partial denture for him.    I am happy to hear all of this and I will see him as he needs in the future.         Again, thank you for allowing me to participate in the care of your patient.      Sincerely,    Milena Gore MD

## 2022-10-05 NOTE — LETTER
Date:October 6, 2022      Patient was self referred, no letter generated. Do not send.        [facility] Health Information

## 2023-02-12 ENCOUNTER — HEALTH MAINTENANCE LETTER (OUTPATIENT)
Age: 28
End: 2023-02-12

## 2024-03-10 ENCOUNTER — HEALTH MAINTENANCE LETTER (OUTPATIENT)
Age: 29
End: 2024-03-10

## 2025-03-16 ENCOUNTER — HEALTH MAINTENANCE LETTER (OUTPATIENT)
Age: 30
End: 2025-03-16

## (undated) DEVICE — LINEN TOWEL PACK X30 5481

## (undated) DEVICE — SU VICRYL 3-0 SH 27" UND J416H

## (undated) DEVICE — APPLICATOR COTTON TIP 6"X2 STERILE LF 6012

## (undated) DEVICE — RETR ELASTIC STAYS LONE STAR BLUNT SGL PK 3350-1G

## (undated) DEVICE — SOL ADH LIQUID BENZOIN SWAB 0.6ML C1544

## (undated) DEVICE — WIPES FOLEY CARE SURESTEP PROVON DFC100

## (undated) DEVICE — CLIP HORIZON SM RED WIDE SLOT 001201

## (undated) DEVICE — DRAPE U SPLIT 74X120" 29440

## (undated) DEVICE — TEST TUBE W/SCREW CAP 17361

## (undated) DEVICE — SYR BULB IRRIG DOVER 60 ML LATEX FREE 67000

## (undated) DEVICE — LINEN GOWN XLG 5407

## (undated) DEVICE — BLADE KNIFE SURG 15 371115

## (undated) DEVICE — SPONGE KITTNER 30-101

## (undated) DEVICE — TUBE GASTROSTOMY PEG SET 20FR G22638 FLOW-20-PULL-S

## (undated) DEVICE — SU SILK 0 SH 30" K834H

## (undated) DEVICE — LINEN TOWEL PACK X5 5464

## (undated) DEVICE — GLOVE PROTEXIS MICRO 8.0  2D73PM80

## (undated) DEVICE — ESU ELEC BLADE 2.75" COATED/INSULATED E1455

## (undated) DEVICE — SOL NACL 0.9% IRRIG 1000ML BOTTLE 2F7124

## (undated) DEVICE — TUBING SMOKE EVAC PNEUMOCLEAR HIGH FLOW 0620050250

## (undated) DEVICE — SU SILK 2-0 SH CR 5X18" C0125

## (undated) DEVICE — DRSG STERI STRIP 1/2X4" R1547

## (undated) DEVICE — ESU PENCIL W/SMOKE EVAC ROCKER E2350HS

## (undated) DEVICE — DRAPE IOBAN INCISE 23X17" 6650EZ

## (undated) DEVICE — SPONGE RAY-TEC 4X8" 7318

## (undated) DEVICE — SPONGE DOUBLE 1.25" W/STRING 23275-690

## (undated) DEVICE — DRSG KERLIX 4 1/2"X4YDS ROLL 6715

## (undated) DEVICE — CLIP APPLIER ENDO 5MM M/L LIGAMAX EL5ML

## (undated) DEVICE — SU MONOCRYL 4-0 PS-2 27" UND Y426H

## (undated) DEVICE — SU SILK 2-0 TIE 12X30" A305H

## (undated) DEVICE — ENDO TROCAR FIRST ENTRY KII FIOS Z-THRD 05X100MM CTF03

## (undated) DEVICE — PACK NEURO MINOR UMMC SNE32MNMU4

## (undated) DEVICE — DRSG DRAIN 4X4" 7086

## (undated) DEVICE — SU VICRYL 0 UR-6 27" J603H

## (undated) DEVICE — DECANTER VIAL 2006S

## (undated) DEVICE — ESU GROUND PAD ADULT W/CORD E7507

## (undated) DEVICE — SUCTION MANIFOLD NEPTUNE 2 SYS 4 PORT 0702-020-000

## (undated) DEVICE — LINEN TOWEL PACK X6 WHITE 5487

## (undated) DEVICE — STPL SKIN 35W ROTATING HEAD PRW35

## (undated) DEVICE — TUBING SUCTION 10'X3/16" N510

## (undated) DEVICE — GLOVE PROTEXIS BLUE W/NEU-THERA 8.5  2D73EB85

## (undated) DEVICE — TUBING INSUFFLATION PNEUMOCLEAR 0620050100

## (undated) DEVICE — DRSG KERLIX 4 1/2"X4YDS ROLL 6730

## (undated) DEVICE — SU SILK 4-0 TIE 12X30" A303H

## (undated) DEVICE — SUCTION TIP YANKAUER STR K87

## (undated) DEVICE — SUCTION DRY CHEST DRAIN OASIS 3600-100

## (undated) DEVICE — SPONGE LAP 18X18" X8435

## (undated) DEVICE — ESU PENCIL SMOKE EVAC W/ROCKER SWITCH 0703-047-000

## (undated) DEVICE — ENDO TROCAR FIRST ENTRY KII FIOS Z-THRD 12X100MM CTF73

## (undated) DEVICE — SU SILK 3-0 TIE 12X30" A304H

## (undated) DEVICE — SOL WATER IRRIG 1000ML BOTTLE 2F7114

## (undated) DEVICE — DRSG PRIMAPORE 02X3" 7133

## (undated) DEVICE — DRAPE SHEET REV FOLD 3/4 9349

## (undated) DEVICE — SU SILK 0 TIE 6X30" A306H

## (undated) DEVICE — Device

## (undated) DEVICE — CLIP HORIZON MED BLUE 002200

## (undated) DEVICE — PREP POVIDONE IODINE SOLUTION 10% 4OZ BOTTLE 29906-004

## (undated) DEVICE — PREP CHLORAPREP 26ML TINTED HI-LITE ORANGE 930815

## (undated) DEVICE — PREP SKIN SCRUB TRAY 4461A

## (undated) DEVICE — ENDO SCOPE WARMER SEAL  C3101

## (undated) DEVICE — SU VICRYL 4-0 PS-2 18" UND J496H

## (undated) DEVICE — PAD CHUX UNDERPAD 23X24" 7136

## (undated) RX ORDER — CHLORHEXIDINE GLUCONATE ORAL RINSE 1.2 MG/ML
SOLUTION DENTAL
Status: DISPENSED
Start: 2022-05-13

## (undated) RX ORDER — FENTANYL CITRATE 50 UG/ML
INJECTION, SOLUTION INTRAMUSCULAR; INTRAVENOUS
Status: DISPENSED
Start: 2022-05-04

## (undated) RX ORDER — PROPOFOL 10 MG/ML
INJECTION, EMULSION INTRAVENOUS
Status: DISPENSED
Start: 2022-05-04

## (undated) RX ORDER — LIDOCAINE HYDROCHLORIDE AND EPINEPHRINE 10; 10 MG/ML; UG/ML
INJECTION, SOLUTION INFILTRATION; PERINEURAL
Status: DISPENSED
Start: 2022-05-04

## (undated) RX ORDER — FENTANYL CITRATE 50 UG/ML
INJECTION, SOLUTION INTRAMUSCULAR; INTRAVENOUS
Status: DISPENSED
Start: 2022-05-13

## (undated) RX ORDER — ENOXAPARIN SODIUM 100 MG/ML
INJECTION SUBCUTANEOUS
Status: DISPENSED
Start: 2022-05-13